# Patient Record
Sex: FEMALE | Race: WHITE | NOT HISPANIC OR LATINO | Employment: OTHER | URBAN - METROPOLITAN AREA
[De-identification: names, ages, dates, MRNs, and addresses within clinical notes are randomized per-mention and may not be internally consistent; named-entity substitution may affect disease eponyms.]

---

## 2017-02-15 ENCOUNTER — ALLSCRIPTS OFFICE VISIT (OUTPATIENT)
Dept: OTHER | Facility: OTHER | Age: 82
End: 2017-02-15

## 2017-03-20 ENCOUNTER — GENERIC CONVERSION - ENCOUNTER (OUTPATIENT)
Dept: OTHER | Facility: OTHER | Age: 82
End: 2017-03-20

## 2017-04-17 ENCOUNTER — GENERIC CONVERSION - ENCOUNTER (OUTPATIENT)
Dept: OTHER | Facility: OTHER | Age: 82
End: 2017-04-17

## 2017-05-18 ENCOUNTER — ALLSCRIPTS OFFICE VISIT (OUTPATIENT)
Dept: OTHER | Facility: OTHER | Age: 82
End: 2017-05-18

## 2017-05-30 ENCOUNTER — ALLSCRIPTS OFFICE VISIT (OUTPATIENT)
Dept: OTHER | Facility: OTHER | Age: 82
End: 2017-05-30

## 2017-05-31 DIAGNOSIS — M54.50 LOW BACK PAIN: ICD-10-CM

## 2017-05-31 DIAGNOSIS — K21.9 GASTRO-ESOPHAGEAL REFLUX DISEASE WITHOUT ESOPHAGITIS: ICD-10-CM

## 2017-05-31 DIAGNOSIS — I10 ESSENTIAL (PRIMARY) HYPERTENSION: ICD-10-CM

## 2017-05-31 DIAGNOSIS — E78.5 HYPERLIPIDEMIA: ICD-10-CM

## 2017-06-02 ENCOUNTER — APPOINTMENT (OUTPATIENT)
Dept: LAB | Facility: CLINIC | Age: 82
End: 2017-06-02
Payer: MEDICARE

## 2017-06-02 DIAGNOSIS — K21.9 GASTRO-ESOPHAGEAL REFLUX DISEASE WITHOUT ESOPHAGITIS: ICD-10-CM

## 2017-06-02 DIAGNOSIS — E78.5 HYPERLIPIDEMIA: ICD-10-CM

## 2017-06-02 DIAGNOSIS — I10 ESSENTIAL (PRIMARY) HYPERTENSION: ICD-10-CM

## 2017-06-02 LAB
ALBUMIN SERPL BCP-MCNC: 3.8 G/DL (ref 3.5–5)
ALP SERPL-CCNC: 73 U/L (ref 46–116)
ALT SERPL W P-5'-P-CCNC: 17 U/L (ref 12–78)
ANION GAP SERPL CALCULATED.3IONS-SCNC: 4 MMOL/L (ref 4–13)
AST SERPL W P-5'-P-CCNC: 19 U/L (ref 5–45)
BASOPHILS # BLD AUTO: 0.04 THOUSANDS/ΜL (ref 0–0.1)
BASOPHILS NFR BLD AUTO: 1 % (ref 0–1)
BILIRUB SERPL-MCNC: 0.5 MG/DL (ref 0.2–1)
BUN SERPL-MCNC: 30 MG/DL (ref 5–25)
CALCIUM SERPL-MCNC: 9.3 MG/DL (ref 8.3–10.1)
CHLORIDE SERPL-SCNC: 109 MMOL/L (ref 100–108)
CHOLEST SERPL-MCNC: 241 MG/DL (ref 50–200)
CO2 SERPL-SCNC: 24 MMOL/L (ref 21–32)
CREAT SERPL-MCNC: 0.97 MG/DL (ref 0.6–1.3)
EOSINOPHIL # BLD AUTO: 0.23 THOUSAND/ΜL (ref 0–0.61)
EOSINOPHIL NFR BLD AUTO: 3 % (ref 0–6)
ERYTHROCYTE [DISTWIDTH] IN BLOOD BY AUTOMATED COUNT: 13.1 % (ref 11.6–15.1)
GFR SERPL CREATININE-BSD FRML MDRD: 55.3 ML/MIN/1.73SQ M
GLUCOSE P FAST SERPL-MCNC: 93 MG/DL (ref 65–99)
HCT VFR BLD AUTO: 41.6 % (ref 34.8–46.1)
HDLC SERPL-MCNC: 86 MG/DL (ref 40–60)
HGB BLD-MCNC: 13.8 G/DL (ref 11.5–15.4)
LDLC SERPL CALC-MCNC: 139 MG/DL (ref 0–100)
LDLC SERPL DIRECT ASSAY-MCNC: 135 MG/DL (ref 0–100)
LYMPHOCYTES # BLD AUTO: 1.15 THOUSANDS/ΜL (ref 0.6–4.47)
LYMPHOCYTES NFR BLD AUTO: 15 % (ref 14–44)
MCH RBC QN AUTO: 29.1 PG (ref 26.8–34.3)
MCHC RBC AUTO-ENTMCNC: 33.2 G/DL (ref 31.4–37.4)
MCV RBC AUTO: 88 FL (ref 82–98)
MONOCYTES # BLD AUTO: 0.56 THOUSAND/ΜL (ref 0.17–1.22)
MONOCYTES NFR BLD AUTO: 7 % (ref 4–12)
NEUTROPHILS # BLD AUTO: 5.7 THOUSANDS/ΜL (ref 1.85–7.62)
NEUTS SEG NFR BLD AUTO: 74 % (ref 43–75)
NRBC BLD AUTO-RTO: 0 /100 WBCS
PLATELET # BLD AUTO: 251 THOUSANDS/UL (ref 149–390)
PMV BLD AUTO: 10.8 FL (ref 8.9–12.7)
POTASSIUM SERPL-SCNC: 4.9 MMOL/L (ref 3.5–5.3)
PROT SERPL-MCNC: 7 G/DL (ref 6.4–8.2)
RBC # BLD AUTO: 4.74 MILLION/UL (ref 3.81–5.12)
SODIUM SERPL-SCNC: 137 MMOL/L (ref 136–145)
TRIGL SERPL-MCNC: 82 MG/DL
WBC # BLD AUTO: 7.69 THOUSAND/UL (ref 4.31–10.16)

## 2017-06-02 PROCEDURE — 80053 COMPREHEN METABOLIC PANEL: CPT

## 2017-06-02 PROCEDURE — 80061 LIPID PANEL: CPT

## 2017-06-02 PROCEDURE — 83721 ASSAY OF BLOOD LIPOPROTEIN: CPT

## 2017-06-02 PROCEDURE — 85025 COMPLETE CBC W/AUTO DIFF WBC: CPT

## 2017-06-02 PROCEDURE — 36415 COLL VENOUS BLD VENIPUNCTURE: CPT

## 2017-06-06 ENCOUNTER — ALLSCRIPTS OFFICE VISIT (OUTPATIENT)
Dept: OTHER | Facility: OTHER | Age: 82
End: 2017-06-06

## 2017-06-06 ENCOUNTER — HOSPITAL ENCOUNTER (OUTPATIENT)
Dept: RADIOLOGY | Facility: CLINIC | Age: 82
Discharge: HOME | End: 2017-06-06
Payer: MEDICARE

## 2017-06-06 ENCOUNTER — TRANSCRIBE ORDERS (OUTPATIENT)
Dept: RADIOLOGY | Facility: CLINIC | Age: 82
End: 2017-06-06

## 2017-06-06 DIAGNOSIS — M54.50 LOW BACK PAIN: ICD-10-CM

## 2017-06-06 PROCEDURE — 72110 X-RAY EXAM L-2 SPINE 4/>VWS: CPT

## 2017-06-12 ENCOUNTER — ALLSCRIPTS OFFICE VISIT (OUTPATIENT)
Dept: RADIOLOGY | Facility: CLINIC | Age: 82
End: 2017-06-12
Payer: MEDICARE

## 2017-06-12 PROCEDURE — 77002 NEEDLE LOCALIZATION BY XRAY: CPT | Performed by: ANESTHESIOLOGY

## 2017-08-17 ENCOUNTER — ALLSCRIPTS OFFICE VISIT (OUTPATIENT)
Dept: OTHER | Facility: OTHER | Age: 82
End: 2017-08-17

## 2017-11-16 ENCOUNTER — ALLSCRIPTS OFFICE VISIT (OUTPATIENT)
Dept: OTHER | Facility: OTHER | Age: 82
End: 2017-11-16

## 2017-11-17 NOTE — PROGRESS NOTES
Assessment    1  Primary osteoarthritis of both knees (715 16) (M17 0)    Plan  Primary osteoarthritis of both knees    · Betamethasone Sod Phos & Acet 6 (3-3) MG/ML Injection Suspension   · Betamethasone Sod Phos & Acet 6 (3-3) MG/ML Injection Suspension   · Follow-up PRN Evaluation and Treatment  Follow-up  Status: Complete  Done:16Nov2017    Discussion/Summary    Findings consistent with bilateral knee DJD  Discussed findings and treatment options with the patient  Patient was given bilateral knee joint cortisone injections today  She tolerated the procedure well  Advised to apply a cold compress today  She may have repeat injections in 3-4 months if needed  I also discussed with the patient possible use of joint supplement injections  Patient does not want to have knee surgery  All patient's questions were answered to his satisfaction  This note is dictated using WinWeb software  It may contains topographical errors, grammatical errors, improperly dictated words, background noise and other errors  Chief Complaint  Bilateral knee pain  History of Present Illness  HPI: 78 yo white female with history of bilateral knee DJD  The last injections in 08/2017 helped until 2 weeks ago  She start getting more pain in the knee  She is here to have the cortisone injection again  No locking or given away symptoms  No new injury  Review of Systems   Constitutional: No fever, no chills, feels well, no tiredness, no recent weight gain or loss  Eyes: No complaints of eyesight problems, no red eyes  ENT: no loss of hearing, no nosebleeds, no sore throat  Cardiovascular: No complaints of chest pain, no palpitations, no leg claudication or lower extremity edema  Respiratory: no compliants of shortness of breath, no wheezing, no cough  Gastrointestinal: no complaints of abdominal pain, no constipation, no nausea or diarrhea, no vomiting, no bloody stools  Genitourinary: no complaints of dysuria, no incontinence  Musculoskeletal: as noted in HPI  Integumentary: no complaints of skin rash or lesion, no itching or dry skin, no skin wounds  Neurological: no complaints of headache, no confusion, no numbness or tingling, no dizziness  Endocrine: No complaints of muscle weakness, no feelings of weakness, no frequent urination, no excessive thirst   Psychiatric: No suicidal thoughts, no anxiety, no feelings of depression  ROS reviewed  Active Problems  1  Allergic rhinitis (477 9) (J30 9)   2  Back pain (724 5) (M54 9)   3  Bilateral low back pain without sciatica, unspecified chronicity (724 2) (M54 5)   4  Colon polyps (211 3) (K63 5)   5  Encounter for screening mammogram for breast cancer (V76 12) (Z12 31)   6  Generalized osteoarthritis of multiple sites (715 09) (M15 9)   7  GERD (gastroesophageal reflux disease) (530 81) (K21 9)   8  Greater trochanteric bursitis of both hips (726 5) (M70 61,M70 62)   9  Hyperlipidemia (272 4) (E78 5)   10  Hypertension (401 9) (I10)   11  Left knee pain (719 46) (M25 562)   12  Primary osteoarthritis of both knees (715 16) (M17 0)   13  Right knee pain (719 46) (M25 561)    Past Medical History   · History of arthritis (V13 4) (Z87 39)   · History of Otitis externa of right ear (380 10) (H60 91)   · History of Previous Pregnancies Resulted In 2  Living Children   · History of Previously Pregnant Including 2  Miscarriage(S)   · History of Recent Pregnancy With Pregnancy No  4    Surgical History   · History of Dilation And Curettage   · History of Simple Bunion Exostectomy (Silver Procedure)   · History of Tonsillectomy    Family History  Sister    · Family history of Breast Cancer (V16 3)  Brother    · Family history of Cancer    Social History     · Alcohol use (V49 89) (Z78 9)   · Former smoker (V15 82) (V09 489)   · No drug use   · Retired    Current Meds   1  Aspirin 325 MG Oral Tablet; Take 3 tablets TID Recorded   2  Lisinopril 20 MG Oral Tablet;  Take 1 tablet daily; Therapy: 73ZBR1320 to (Evaluate:26Nov2017)  Requested for: 89DVF4282; Last Rx:[id number] Ordered    Allergies  1  Tramadol    Vitals  Signs     Heart Rate: 72  Systolic: 221  Diastolic: 82  Height: 5 ft 3 in  Weight: 143 lb   BMI Calculated: 25 33  BSA Calculated: 1 68    Physical Exam   Constitutional - General appearance: Normal   Musculoskeletal - Gait and station: Normal   Neurologic - Sensation: Normal -- Lower extremity peripheral neuro exam: Normal   Psychiatric - Orientation to person, place, and time: Normal -- Mood and affect: Normal   Eyes  Conjunctiva and lids: Normal     Left Knee: Appearance: joint hypertrophy, but-- no deformity,-- no effusion-- and-- no swelling  Tenderness: lateral joint line-- and-- medial joint line, but-- not diffusely over the anterior knee,-- not the undersurface of the patella-- and-- not the quadriceps tendon  Palpatory findings include crepitus  ROM: Full  Motor: Normal  Special Test: negative patellofemoral apprehension test,-- negative medial Luiza test,-- negative lateral Luiza test,-- no laxity on Valgus Stress-- and-- no laxity on Varus Stress  Right Knee: Appearance: joint hypertrophy, but-- no deformity,-- no effusion-- and-- no swelling  Tenderness: lateral joint line-- and-- medial joint line, but-- not diffusely over the anterior knee,-- not the undersurface of the patella-- and-- not the quadriceps tendon  Palpatory findings include crepitus  ROM: Full  Motor: Normal  Special Test: negative patellofemoral apprehension test,-- negative medial Luiza test,-- negative lateral Luiza test,-- no laxity on Valgus Stress-- and-- no laxity on Varus Stress  Procedure    Procedure: Injection of bilateral knee joint  Indication:  Osteoarthritis  Risk and benefits were discussed with the patient  Alcohol was used to prep the area  ethyl chloride spray was used as a topical anesthetic   Using sterile technique, the aspiration/injection needle was then directed from a Anterolateral aspect  A 22-gauge and 1 5 inch needle was used to inject 7 mL of 1% Lidocaine-- and-- 1 mL of 6mg/mL betamethasone  A bandage was applied  the patient tolerated the procedure well  Complications: none        Future Appointments    Date/Time Provider Specialty Site   02/27/2018 09:15 AM Alyx Powell MD Orthopedic Surgery [address]       Signatures   Electronically signed by : Adis Keller MD; Nov 16 2017 10:11AM EST                       (Author)

## 2018-01-09 NOTE — PROGRESS NOTES
Assessment    1  Primary osteoarthritis of both knees (715 16) (M17 0)    Plan  Allergic rhinitis    · Claritin 10 MG Oral Capsule  Left knee pain    · * XR KNEE 4+ VIEW LEFT; Status:Resulted - Requires Verification;   Done: 34PQZ3061  09:40AM  Primary osteoarthritis of both knees    · Betamethasone Sod Phos & Acet 6 (3-3) MG/ML Injection Suspension   · Betamethasone Sod Phos & Acet 6 (3-3) MG/ML Injection Suspension   · Follow-up PRN Evaluation and Treatment  Follow-up  Status: Complete  Done:  [id number]  Right knee pain    · * XR KNEE 4+ VIEW RIGHT; Status:Resulted - Requires Verification;   Done: 17GUU0667  09:40AM    Discussion/Summary    Findings consistent with bilateral knee DJD  Discussed findings and treatment options  Will provide her cortisone injection as she wishes  Can repeat the injection every 3-4 months as needed  Cold compress today  Chief Complaint  Bilateral knee pain  History of Present Illness  HPI: 78 yo white female with history of bilateral knee DJD  She denies any injury  Her last cortisone injection was done back in 2013  Recently, her knee pain has increased  She denies any locking or given away  She also has had joint supplement injection in the past       Review of Systems    Constitutional: No fever, no chills, feels well, no tiredness, no recent weight gain or loss  Eyes: No complaints of eyesight problems, no red eyes  ENT: no loss of hearing, no nosebleeds, no sore throat  Cardiovascular: No complaints of chest pain, no palpitations, no leg claudication or lower extremity edema  Respiratory: no compliants of shortness of breath, no wheezing, no cough  Gastrointestinal: no complaints of abdominal pain, no constipation, no nausea or diarrhea, no vomiting, no bloody stools  Genitourinary: no complaints of dysuria, no incontinence  Musculoskeletal: as noted in HPI  Integumentary: no complaints of skin rash or lesion, no itching or dry skin, no skin wounds  Neurological: no complaints of headache, no confusion, no numbness or tingling, no dizziness  Endocrine: No complaints of muscle weakness, no feelings of weakness, no frequent urination, no excessive thirst    Psychiatric: No suicidal thoughts, no anxiety, no feelings of depression  Active Problems    1  Allergic rhinitis (477 9) (J30 9)   2  Colon polyps (211 3) (K63 5)   3  Generalized osteoarthritis of multiple sites (715 09) (M15 9)   4  GERD (gastroesophageal reflux disease) (530 81) (K21 9)   5  Hyperlipidemia (272 4) (E78 5)   6  Hypertension (401 9) (I10)   7  Left knee pain (719 46) (M25 562)   8  Otitis externa of right ear (380 10) (H60 91)   9  Right knee pain (719 46) (M25 561)    Past Medical History    · History of Previous Pregnancies Resulted In 2  Living Children   · History of Previously Pregnant Including 2  Miscarriage(S)   · History of Recent Pregnancy With Pregnancy No  4    Surgical History    · History of Tonsillectomy    Family History    · Family history of Breast Cancer (V16 3)    · Family history of Cancer    Social History    · Alcohol use (V49 89) (F10 99)   · Former smoker (V15 82) (S63 142)   · No drug use   · Retired    Current Meds   1  Aspirin 325 MG Oral Tablet; Take 3 tablets TID Recorded   2  Claritin 10 MG Oral Capsule; Take one tablet daily; Therapy: 12Pww8460 to (Evaluate:90Dew5944); Last Rx:[id number] Ordered   3  Lisinopril 10 MG Oral Tablet; TAKE 1 TABLET DAILY FOR BLOOD PRESSURE; Therapy: 81ZTZ3092 to (Magnolia Fletcher)  Requested for: 52QTC6843; Last   Rx:[id number] Ordered    Allergies    1   Tramadol    Vitals  Signs [Data Includes: Current Encounter]    Heart Rate: 58  Systolic: 798  Diastolic: 84  Height: 5 ft 3 in  Weight: 154 lb   BMI Calculated: 27 28  BSA Calculated: 1 74    Physical Exam    Constitutional - General appearance: Normal    Musculoskeletal - Gait and station: Normal    Neurologic - Sensation: Normal  Lower extremity peripheral neuro exam: Normal    Psychiatric - Orientation to person, place, and time: Normal  Mood and affect: Normal    Eyes   Conjunctiva and lids: Normal     Left Knee: Appearance: joint hypertrophy, but no deformity, no effusion and no swelling  Tenderness: lateral joint line and medial joint line, but not diffusely over the anterior knee, not the undersurface of the patella and not the quadriceps tendon  Palpatory findings include crepitus  ROM: Full  Motor: Normal  Special Test: negative patellofemoral apprehension test, negative medial Luiza test, negative lateral Luiza test, no laxity on Valgus Stress and no laxity on Varus Stress  Right Knee: Appearance: joint hypertrophy, but no deformity, no effusion and no swelling  Tenderness: lateral joint line and medial joint line, but not diffusely over the anterior knee, not the undersurface of the patella and not the quadriceps tendon  Palpatory findings include crepitus  ROM: Full  Motor: Normal  Special Test: negative patellofemoral apprehension test, negative medial Luiza test, negative lateral Luiza test, no laxity on Valgus Stress and no laxity on Varus Stress  Results/Data  * XR KNEE 4+ VIEW LEFT 27Jan2016 09:40AM Kyler Monroe    Order Number: [id number]     Test Name Result Flag Reference   XR KNEE 4+ VW LEFT (Report)     LEFT KNEE     INDICATION: Chronic pain  No history of trauma  COMPARISON: August 1, 2012     VIEWS: 4; 4 images     FINDINGS:     There is no acute fracture or dislocation  There is no joint effusion  Osseous structures demineralized  Progressive, severe narrowing of the medial joint compartment  Flattening of the medial femoral condyle  Flattening of the medial tibial plateau  Subchondral cystic changes in the medial tibial plateau and medial    femoral condyle  Moderate narrowing of the lateral joint compartment  Sharpening of the tibial spines  Severe narrowing of the patellofemoral space with osteophytosis       No lytic or blastic lesions are seen  Soft tissues are unremarkable  IMPRESSION:   Progressive, severe degenerative changes  No acute osseous abnormality  Signed by:   Asael Frey DO   1/27/16     * XR KNEE 4+ VIEW RIGHT 27Jan2016 09:40AM [facility] Order Number: [id number]     Test Name Result Flag Reference   XR KNEE 4+ VW RIGHT (Report)     RIGHT KNEE     INDICATION: Chronic pain  No history of trauma  COMPARISON: August 1, 2012     VIEWS: 4; 4 images     FINDINGS:     There is no acute fracture or dislocation  There is no joint effusion  Osseous structures demineralized  Progressive, severe narrowing of the medial joint compartment  Flattening of the medial femoral condyle and medial tibial plateau  Subchondral cystic changes in the medial femoral condyle and medial tibial plateau  Moderate narrowing of the lateral joint compartment with osteophytosis  Moderate to severe narrowing of the patellofemoral space with osteophytosis  Degenerative, medial subluxation of the distal femur with respect to the tibia  No lytic or blastic lesions are seen  Soft tissues are unremarkable  IMPRESSION:   Progressive, severe degenerative changes  No acute osseous abnormality  Signed by:   Asael Frey DO   1/27/16     I personally reviewed the films/images/results in the office today  My interpretation follows  X-ray Review Bilateral knee showed severe DJD  Marginal osteophytes  No soft tissue calcification  Procedure    Procedure: Injection of bilateral knee joint  Indication:  Osteoarthritis  Risk and benefits were discussed with the patient  Alcohol was used to prep the area  ethyl chloride spray was used as a topical anesthetic  Using sterile technique, the aspiration/injection needle was then directed from a Anterolateral aspect  A 22-gauge was used to inject 7 mL of 1% Lidocaine and 1 mL of 6mg/mL betamethasone  A bandage was applied   the patient tolerated the procedure well  Complications: none        Signatures   Electronically signed by : Jarred Osborne MD; Jan 27 2016 10:25AM EST                       (Author)

## 2018-01-10 NOTE — MISCELLANEOUS
Message   Recorded as Task   Date: 04/17/2017 01:35 PM, Created By: Daniel Diaz   Task Name: Med Renewal Request   Assigned To: Edilson Chambers   Regarding Patient: Amanda Valenzuela, Status: Active   Comment:    Myra Knox - 17 Apr 2017 1:35 PM     TASK CREATED  Caller: Self; Renew Medication; [phone] (Home)  Refill Lisinopril 10mg to [facility] -- call [phone] if ? Leila Mcknight - 17 Apr 2017 2:37 PM     TASK REASSIGNED: Previously Assigned To Leila Mcknight  Please let the patient know that I sent in a refill for 2 months but she should be scheduling a checkup soon  Blake,April - 17 Apr 2017 2:41 PM     TASK EDITED  left detailed message on patient's personal number  Active Problems    1  Allergic rhinitis (477 9) (J30 9)   2  Colon polyps (211 3) (K63 5)   3  Generalized osteoarthritis of multiple sites (715 09) (M15 9)   4  GERD (gastroesophageal reflux disease) (530 81) (K21 9)   5  Hyperlipidemia (272 4) (E78 5)   6  Hypertension (401 9) (I10)   7  Left knee pain (719 46) (M25 562)   8  Otitis externa of right ear (380 10) (H60 91)   9  Primary osteoarthritis of both knees (715 16) (M17 0)   10  Right knee pain (719 46) (M25 561)    Current Meds   1  Aspirin 325 MG Oral Tablet; Take 3 tablets TID Recorded   2  Lisinopril 10 MG Oral Tablet; TAKE 1 TABLET DAILY FOR BLOOD PRESSURE; Therapy: 98EGE6019 to (Evaluate:76Raq1690)  Requested for: 82Sgq4326; Last   Rx:[id number] Ordered    Allergies    1   Tramadol    Signatures   Electronically signed by : Wen Prakash, ; Apr 17 2017  2:41PM EST                       (Author)

## 2018-01-12 VITALS
HEIGHT: 63 IN | HEART RATE: 67 BPM | DIASTOLIC BLOOD PRESSURE: 78 MMHG | SYSTOLIC BLOOD PRESSURE: 143 MMHG | BODY MASS INDEX: 26.58 KG/M2 | WEIGHT: 150 LBS

## 2018-01-12 VITALS
WEIGHT: 145 LBS | BODY MASS INDEX: 25.69 KG/M2 | HEIGHT: 63 IN | SYSTOLIC BLOOD PRESSURE: 122 MMHG | TEMPERATURE: 97.9 F | DIASTOLIC BLOOD PRESSURE: 64 MMHG

## 2018-01-13 VITALS
WEIGHT: 143 LBS | HEART RATE: 72 BPM | SYSTOLIC BLOOD PRESSURE: 132 MMHG | DIASTOLIC BLOOD PRESSURE: 82 MMHG | BODY MASS INDEX: 25.34 KG/M2 | HEIGHT: 63 IN

## 2018-01-13 VITALS
BODY MASS INDEX: 24.98 KG/M2 | HEIGHT: 63 IN | DIASTOLIC BLOOD PRESSURE: 78 MMHG | SYSTOLIC BLOOD PRESSURE: 120 MMHG | WEIGHT: 141 LBS | HEART RATE: 68 BPM

## 2018-01-14 VITALS
BODY MASS INDEX: 25.69 KG/M2 | DIASTOLIC BLOOD PRESSURE: 78 MMHG | HEART RATE: 64 BPM | WEIGHT: 145 LBS | SYSTOLIC BLOOD PRESSURE: 140 MMHG | HEIGHT: 63 IN

## 2018-01-14 VITALS
BODY MASS INDEX: 27.46 KG/M2 | WEIGHT: 155 LBS | SYSTOLIC BLOOD PRESSURE: 142 MMHG | HEART RATE: 76 BPM | DIASTOLIC BLOOD PRESSURE: 68 MMHG

## 2018-01-16 NOTE — MISCELLANEOUS
Message   Recorded as Task   Date: 03/20/2017 11:56 AM, Created By: Christelle Gracia   Task Name: Med Renewal Request   Assigned To: Ez Dumont   Regarding Patient: Everardo Deal, Status: Active   Comment:    Lorena Dejesus - 20 Mar 2017 11:56 AM     TASK CREATED  Caller: Self; Renew Medication; [phone] (Home)  EVELYN IS ASKING IF YOU WOULD ORDER A     "C" RING  (THAT IS THE SIZE)TO HOLD IN HER BLADDER, SHE LOST THE ONE SHE HAD (PESSRY IS THE NAME OF IT)     [employer]    [phone]    I TOLD HER I WAS NOT SURE THAT THIS COULD BE DONE, SHE SAID THAT IF YOU COULDN'T DO IT SHE WILL GO ON THE INTERNET AND GET IT THAT WAY  Leila Mcknight - 20 Mar 2017 12:53 PM     TASK REASSIGNED: Previously Assigned To Ez Dumont  She needs to see a GYN for this and they will prescribe-would recommend Padmini Gary - 20 Mar 2017 2:45 PM     TASK EDITED  She again said that she was going to try and order off the internet  She says that she has Dr Agnes Griffin phone number and saw her awhile ago  She does not want to see a doctor  I advised against her going on the internet for a "C" ring  mjs        Active Problems    1  Allergic rhinitis (477 9) (J30 9)   2  Colon polyps (211 3) (K63 5)   3  Generalized osteoarthritis of multiple sites (715 09) (M15 9)   4  GERD (gastroesophageal reflux disease) (530 81) (K21 9)   5  Hyperlipidemia (272 4) (E78 5)   6  Hypertension (401 9) (I10)   7  Left knee pain (719 46) (M25 562)   8  Otitis externa of right ear (380 10) (H60 91)   9  Primary osteoarthritis of both knees (715 16) (M17 0)   10  Right knee pain (719 46) (M25 561)    Current Meds   1  Aspirin 325 MG Oral Tablet; Take 3 tablets TID Recorded   2  Lisinopril 10 MG Oral Tablet; TAKE 1 TABLET DAILY FOR BLOOD PRESSURE; Therapy: 07YLZ4041 to (Ida Valencia)  Requested for: 09Rmk1637; Last   Rx:[id number] Ordered    Allergies    1   Tramadol    Signatures   Electronically signed by : Jeremy Amezcua DO; Mar 20 2017  5:02PM EST                       (Author)

## 2018-03-13 ENCOUNTER — OFFICE VISIT (OUTPATIENT)
Dept: PAIN MEDICINE | Facility: CLINIC | Age: 83
End: 2018-03-13
Payer: MEDICARE

## 2018-03-13 VITALS
DIASTOLIC BLOOD PRESSURE: 78 MMHG | BODY MASS INDEX: 24.32 KG/M2 | WEIGHT: 146 LBS | HEART RATE: 78 BPM | TEMPERATURE: 98 F | SYSTOLIC BLOOD PRESSURE: 124 MMHG | HEIGHT: 65 IN

## 2018-03-13 DIAGNOSIS — M70.62 GREATER TROCHANTERIC BURSITIS OF BOTH HIPS: Primary | ICD-10-CM

## 2018-03-13 DIAGNOSIS — M70.61 GREATER TROCHANTERIC BURSITIS OF BOTH HIPS: Primary | ICD-10-CM

## 2018-03-13 DIAGNOSIS — M25.552 BILATERAL HIP PAIN: ICD-10-CM

## 2018-03-13 DIAGNOSIS — M25.551 BILATERAL HIP PAIN: ICD-10-CM

## 2018-03-13 PROBLEM — M54.50 BILATERAL LOW BACK PAIN WITHOUT SCIATICA: Status: ACTIVE | Noted: 2017-06-06

## 2018-03-13 PROBLEM — M54.9 BACK PAIN: Status: ACTIVE | Noted: 2017-06-06

## 2018-03-13 PROCEDURE — 99214 OFFICE O/P EST MOD 30 MIN: CPT | Performed by: ANESTHESIOLOGY

## 2018-03-13 NOTE — PROGRESS NOTES
Assessment:  1  Greater trochanteric bursitis of both hips    2  Bilateral hip pain        Plan: At this point time the patient's pain persists despite relative rest activity modification and nonsteroidal anti-inflammatories  To directly address any inflammatory component of the patient's pain as she has benefit in the past I think it is reasonable pursue bilateral greater trochanteric bursa injection under fluoroscopic guidance  We will Re group after 1-2 weeks if her pain persists 1 May consider further imaging of the lumbar spine  In the office today did review the nature the patient's pathology in depth using diagrams and models, I discussed the approach was used for the greater trochanteric bursa injections and provided literature for home review  Patient understands the risks associated with the procedure not limited to but including bleeding, tissue injury, allergic reaction, nerve damage exacerbation of symptoms patient provided written verbal consent    My impressions and treatment recommendations were discussed in detail with the patient who verbalized understanding and had no further questions  Discharge instructions were provided  I personally saw and examined the patient and I agree with the above discussed plan of care  The patient has the current Goals: Decreased pain and increased function  The patent has the current Barriers:  Greater trochanteric bursitis    Patient is able to Self-Care    The treatment plan was reviewed with the patient  The patient understands and agrees with the treatment plan   The patient  was counseled regarding instructions for management, risk factor reductions, prognosis, patient and family education, risks and benefits of treatment options and importance of compliance with treatment      25 minutes was spent with the patient greater than 50% of the time counseling/coordinating care    Orders Placed This Encounter   Procedures    FL spine and pain procedure Standing Status:   Future     Standing Expiration Date:   3/13/2022     Order Specific Question:   Reason for Exam:     Answer:   b/l GT bursa injection     Order Specific Question:   Anticoagulant hold needed? Answer:   no     No orders of the defined types were placed in this encounter  History of Present Illness:    Orlando Cabrera is a 81 y o  female who was last seen in June of 2017 and underwent a bilateral greater trochanteric bursa injection  Patient had relief for approximately 6 months but her pain subsequently returned significant interfering with her daily living activities  She rates her pain as 10/10 on the visual analog scale is worse in the morning described sharp is intermittent located on the lateral aspect of both thighs  I have personally reviewed and/or updated the patient's past medical history, past surgical history, family history, social history, current medications, allergies, and vital signs today  Review of Systems:    Review of Systems   Constitutional: Negative for fever and unexpected weight change  HENT: Negative for trouble swallowing  Eyes: Negative for visual disturbance  Respiratory: Negative for shortness of breath and wheezing  Cardiovascular: Negative for chest pain and palpitations  Gastrointestinal: Negative for constipation, diarrhea, nausea and vomiting  Endocrine: Negative for cold intolerance, heat intolerance and polydipsia  Genitourinary: Negative for difficulty urinating and frequency  Musculoskeletal: Positive for gait problem (difficulty walking )  Negative for arthralgias, joint swelling and myalgias  Skin: Negative for rash  Neurological: Positive for weakness (muscle weakness)  Negative for dizziness, seizures, syncope and headaches  Hematological: Does not bruise/bleed easily  Psychiatric/Behavioral: Negative for dysphoric mood  All other systems reviewed and are negative        Patient Active Problem List Diagnosis    Allergic rhinitis    Back pain    Bilateral low back pain without sciatica    Colon polyps    Generalized osteoarthritis of multiple sites    GERD (gastroesophageal reflux disease)    Greater trochanteric bursitis of both hips    Hyperlipidemia    Hypertension    Left knee pain    Primary osteoarthritis of both knees    Right knee pain       Past Medical History:   Diagnosis Date    Allergic rhinitis     Arthritis     Back pain     Colon polyps     GERD (gastroesophageal reflux disease)     Hyperlipidemia     Hypertension        Past Surgical History:   Procedure Laterality Date    DILATION AND CURETTAGE OF UTERUS      TONSILLECTOMY         Family History   Problem Relation Age of Onset    Breast cancer Sister     Cancer Brother        Social History     Occupational History    Not on file  Social History Main Topics    Smoking status: Former Smoker    Smokeless tobacco: Never Used    Alcohol use Yes    Drug use: No    Sexual activity: Not on file       Current Outpatient Prescriptions on File Prior to Visit   Medication Sig    aspirin 325 mg tablet Take by mouth    lisinopril (ZESTRIL) 20 mg tablet Take 1 tablet by mouth daily     No current facility-administered medications on file prior to visit  Allergies   Allergen Reactions    Tramadol Headache       Physical Exam:    /78   Pulse 78   Temp 98 °F (36 7 °C) (Oral)   Ht 5' 5" (1 651 m)   Wt 66 2 kg (146 lb)   BMI 24 30 kg/m²     Constitutional: normal, well developed, well nourished, alert, in no distress and non-toxic and no overt pain behavior    Eyes: anicteric  HEENT: grossly intact  Neck: supple, symmetric, trachea midline and no masses   Pulmonary:even and unlabored  Cardiovascular:No edema or pitting edema present  Skin:Normal without rashes or lesions and well hydrated  Psychiatric:Mood and affect appropriate  Neurologic:Cranial Nerves II-XII grossly intact  Musculoskeletal:normal tenderness of the bilateral greater trochanter

## 2018-03-16 ENCOUNTER — HOSPITAL ENCOUNTER (OUTPATIENT)
Dept: RADIOLOGY | Facility: CLINIC | Age: 83
Discharge: HOME | End: 2018-03-16
Attending: ANESTHESIOLOGY | Admitting: ANESTHESIOLOGY
Payer: MEDICARE

## 2018-03-16 VITALS
OXYGEN SATURATION: 98 % | SYSTOLIC BLOOD PRESSURE: 165 MMHG | RESPIRATION RATE: 20 BRPM | DIASTOLIC BLOOD PRESSURE: 79 MMHG | TEMPERATURE: 97.9 F | HEART RATE: 54 BPM

## 2018-03-16 DIAGNOSIS — M70.62 GREATER TROCHANTERIC BURSITIS OF BOTH HIPS: ICD-10-CM

## 2018-03-16 DIAGNOSIS — M25.552 BILATERAL HIP PAIN: ICD-10-CM

## 2018-03-16 DIAGNOSIS — M25.551 BILATERAL HIP PAIN: ICD-10-CM

## 2018-03-16 DIAGNOSIS — M70.61 GREATER TROCHANTERIC BURSITIS OF BOTH HIPS: ICD-10-CM

## 2018-03-16 PROCEDURE — 20610 DRAIN/INJ JOINT/BURSA W/O US: CPT | Performed by: ANESTHESIOLOGY

## 2018-03-16 PROCEDURE — 77002 NEEDLE LOCALIZATION BY XRAY: CPT | Performed by: ANESTHESIOLOGY

## 2018-03-16 RX ORDER — METHYLPREDNISOLONE ACETATE 80 MG/ML
160 INJECTION, SUSPENSION INTRA-ARTICULAR; INTRALESIONAL; INTRAMUSCULAR; PARENTERAL; SOFT TISSUE ONCE
Status: COMPLETED | OUTPATIENT
Start: 2018-03-16 | End: 2018-03-16

## 2018-03-16 RX ORDER — LIDOCAINE HYDROCHLORIDE 10 MG/ML
5 INJECTION, SOLUTION EPIDURAL; INFILTRATION; INTRACAUDAL; PERINEURAL ONCE
Status: COMPLETED | OUTPATIENT
Start: 2018-03-16 | End: 2018-03-16

## 2018-03-16 RX ADMIN — LIDOCAINE HYDROCHLORIDE 7 ML: 20 INJECTION, SOLUTION EPIDURAL; INFILTRATION; INTRACAUDAL at 10:20

## 2018-03-16 RX ADMIN — IOHEXOL 2 ML: 300 INJECTION, SOLUTION INTRAVENOUS at 10:30

## 2018-03-16 RX ADMIN — METHYLPREDNISOLONE ACETATE 160 MG: 80 INJECTION, SUSPENSION INTRA-ARTICULAR; INTRALESIONAL; INTRAMUSCULAR; SOFT TISSUE at 10:19

## 2018-03-16 RX ADMIN — LIDOCAINE HYDROCHLORIDE 5 ML: 10 INJECTION, SOLUTION EPIDURAL; INFILTRATION; INTRACAUDAL; PERINEURAL at 10:19

## 2018-03-16 NOTE — H&P
History of Present Illness: The patient is a 81 y o  female who presents with complaints of bilateral hip pain  Patient Active Problem List   Diagnosis    Allergic rhinitis    Back pain    Bilateral low back pain without sciatica    Colon polyps    Generalized osteoarthritis of multiple sites    GERD (gastroesophageal reflux disease)    Greater trochanteric bursitis of both hips    Hyperlipidemia    Hypertension    Left knee pain    Primary osteoarthritis of both knees    Right knee pain       Past Medical History:   Diagnosis Date    Allergic rhinitis     Arthritis     Back pain     Colon polyps     GERD (gastroesophageal reflux disease)     Hyperlipidemia     Hypertension        Past Surgical History:   Procedure Laterality Date    DILATION AND CURETTAGE OF UTERUS      TONSILLECTOMY           Current Outpatient Prescriptions:     aspirin 325 mg tablet, Take by mouth  , Disp: , Rfl:     lisinopril (ZESTRIL) 20 mg tablet, Take 1 tablet by mouth daily, Disp: , Rfl:     Allergies   Allergen Reactions    Tramadol Headache       Physical Exam:   Vitals:    03/16/18 1002   BP: 165/79   Pulse: (!) 54   Resp: 20   Temp: 97 9 °F (36 6 °C)   SpO2: 98%     General: Awake, Alert, Oriented x 3, Mood and affect appropriate  Respiratory: Respirations even and unlabored  Cardiovascular: Peripheral pulses intact; no edema  Musculoskeletal Exam:  Tenderness to palpation bilateral greater trochanter    ASA Score: II    Assessment:   1  Greater trochanteric bursitis of both hips    2   Bilateral hip pain        Plan: b/l GT bursa injection

## 2018-03-16 NOTE — DISCHARGE INSTRUCTIONS
Steroid Joint Injection   WHAT YOU NEED TO KNOW:   A steroid joint injection is a procedure to inject steroid medicine into a joint  Steroid medicine decreases pain and inflammation  The injection may also contain an anesthetic (numbing medicine) to decrease pain  It may be done to treat conditions such as arthritis, gout, or carpal tunnel syndrome  The injections may be given in your knee, ankle, shoulder, elbow, wrist, ankle or sacroiliac joint  1  Do not apply heat to any area that is numb  If you have discomfort or soreness at the injection site, you may apply ice today, 20 minutes on and 20 minutes off  Tomorrow you may use ice or warm, moist heat  Do not apply ice or heat directly to the skin  2  You may have an increase or change in the discomfort for 36-48 hours after your treatment  Apply ice and continue with any pain medicine you have been prescribed  3  Do not do anything strenuous today  You may shower, but no tub baths or hot tubs today  You may resume your normal activities tomorrow, but do not overdo it  Resume normal activities slowly when you are feeling better  4  If you experience redness, drainage or swelling at the injection site, or if you develop a fever above 100 degrees, please call [facility] at [phone] or go to the Emergency Room  5  Continue to take all routine medicines prescribed by your primary care physician unless otherwise instructed by our staff  Most blood thinners should be started again according to your regularly scheduled dosing  If you have any questions, please give our office a call  If you have a problem specifically related to your procedure, please call our office at [phone]  Problems not related to your procedure should be directed to your primary care physician

## 2018-03-23 ENCOUNTER — TELEPHONE (OUTPATIENT)
Dept: PAIN MEDICINE | Facility: CLINIC | Age: 83
End: 2018-03-23

## 2018-03-23 NOTE — TELEPHONE ENCOUNTER
Patient states she is doing good she got 100% relief and her pain level is a 0/10       S/P B/L [facility] on 03/16/18 w/SL [place]  No f/u scheduled

## 2018-06-14 RX ORDER — LISINOPRIL 20 MG/1
TABLET ORAL
Qty: 30 TABLET | OUTPATIENT
Start: 2018-06-14

## 2018-07-12 ENCOUNTER — OFFICE VISIT (OUTPATIENT)
Dept: OBGYN CLINIC | Facility: CLINIC | Age: 83
End: 2018-07-12
Payer: MEDICARE

## 2018-07-12 VITALS
DIASTOLIC BLOOD PRESSURE: 82 MMHG | SYSTOLIC BLOOD PRESSURE: 140 MMHG | HEIGHT: 65 IN | BODY MASS INDEX: 22.99 KG/M2 | WEIGHT: 138 LBS | HEART RATE: 78 BPM

## 2018-07-12 DIAGNOSIS — M17.0 PRIMARY OSTEOARTHRITIS OF BOTH KNEES: Primary | ICD-10-CM

## 2018-07-12 PROCEDURE — 99213 OFFICE O/P EST LOW 20 MIN: CPT | Performed by: ORTHOPAEDIC SURGERY

## 2018-07-12 PROCEDURE — 20610 DRAIN/INJ JOINT/BURSA W/O US: CPT | Performed by: ORTHOPAEDIC SURGERY

## 2018-07-12 RX ORDER — LIDOCAINE HYDROCHLORIDE 10 MG/ML
4 INJECTION, SOLUTION INFILTRATION; PERINEURAL
Status: COMPLETED | OUTPATIENT
Start: 2018-07-12 | End: 2018-07-12

## 2018-07-12 RX ORDER — BETAMETHASONE SODIUM PHOSPHATE AND BETAMETHASONE ACETATE 3; 3 MG/ML; MG/ML
6 INJECTION, SUSPENSION INTRA-ARTICULAR; INTRALESIONAL; INTRAMUSCULAR; SOFT TISSUE
Status: COMPLETED | OUTPATIENT
Start: 2018-07-12 | End: 2018-07-12

## 2018-07-12 RX ADMIN — LIDOCAINE HYDROCHLORIDE 4 ML: 10 INJECTION, SOLUTION INFILTRATION; PERINEURAL at 10:44

## 2018-07-12 RX ADMIN — LIDOCAINE HYDROCHLORIDE 4 ML: 10 INJECTION, SOLUTION INFILTRATION; PERINEURAL at 10:45

## 2018-07-12 RX ADMIN — BETAMETHASONE SODIUM PHOSPHATE AND BETAMETHASONE ACETATE 6 MG: 3; 3 INJECTION, SUSPENSION INTRA-ARTICULAR; INTRALESIONAL; INTRAMUSCULAR; SOFT TISSUE at 10:44

## 2018-07-12 RX ADMIN — BETAMETHASONE SODIUM PHOSPHATE AND BETAMETHASONE ACETATE 6 MG: 3; 3 INJECTION, SUSPENSION INTRA-ARTICULAR; INTRALESIONAL; INTRAMUSCULAR; SOFT TISSUE at 10:45

## 2018-07-12 NOTE — PROGRESS NOTES
Assessment:     1  Primary osteoarthritis of both knees        Plan:     Problem List Items Addressed This Visit        Musculoskeletal and Integument    Primary osteoarthritis of both knees - Primary     Findings consistent with bilateral knee osteoarthritis  Discussed findings and treatment options with the patient  Cortisone injection was done in both knee under sterile condition, which patient tolerated well  Discussed possible use of joint supplement injection which patient declined  Patient will contact me if her symptoms returned  She may have the cortisone injection repeated every 3-4 months  Cold compress over bilateral knee today  All patient's questions were answered to her satisfaction  This note is created using dictation transcription  It may contain typographical errors, grammatical errors, improperly dictated words, background noise and other errors  Relevant Medications    lidocaine (XYLOCAINE) 1 % injection 4 mL (Completed)    lidocaine (XYLOCAINE) 1 % injection 4 mL (Completed)    betamethasone acetate-betamethasone sodium phosphate (CELESTONE) injection 6 mg (Completed)    betamethasone acetate-betamethasone sodium phosphate (CELESTONE) injection 6 mg (Completed)    Other Relevant Orders    Large joint arthrocentesis (Completed)    Large joint arthrocentesis (Completed)         Subjective:     Patient ID: Tila Rodgers is a 81 y o  female  Chief Complaint:  81-year-old female returns today follow-up bilateral knee osteoarthritis  Patient had cortisone injection done November 2017  The past few weeks her knee pain has increased  She is complaining of aching burning sensations  She denies locking or giving way sensations  Patient is here today requesting repeat cortisone injection  She denies any new injury  She did noticed occasional swelling in both knees  She denies fever, chills, or sweats        Allergy:  Allergies   Allergen Reactions    Tramadol Headache Medications:  all current active meds have been reviewed  Past Medical History:  Past Medical History:   Diagnosis Date    Allergic rhinitis     Arthritis     Back pain     Colon polyps     GERD (gastroesophageal reflux disease)     Hyperlipidemia     Hypertension      Past Surgical History:  Past Surgical History:   Procedure Laterality Date    DILATION AND CURETTAGE OF UTERUS      TONSILLECTOMY       Family History:  Family History   Problem Relation Age of Onset    Breast cancer Sister     Cancer Brother      Social History:  History   Alcohol Use    Yes     History   Drug Use No     History   Smoking Status    Former Smoker   Smokeless Tobacco    Never Used     Review of Systems   Constitutional: Negative  HENT: Negative  Eyes: Negative  Respiratory: Negative  Cardiovascular: Negative  Gastrointestinal: Negative  Endocrine: Negative  Genitourinary: Negative  Musculoskeletal: Positive for arthralgias (Bilateral knee) and joint swelling (Bilateral knee)  Skin: Negative  Allergic/Immunologic: Negative  Hematological: Negative  Psychiatric/Behavioral: Negative  Objective:  BP Readings from Last 1 Encounters:   07/12/18 140/82      Wt Readings from Last 1 Encounters:   07/12/18 62 6 kg (138 lb)      BMI:   Estimated body mass index is 22 96 kg/m² as calculated from the following:    Height as of this encounter: 5' 5" (1 651 m)  Weight as of this encounter: 62 6 kg (138 lb)  BSA:   Estimated body surface area is 1 69 meters squared as calculated from the following:    Height as of this encounter: 5' 5" (1 651 m)  Weight as of this encounter: 62 6 kg (138 lb)  Physical Exam   Constitutional: She is oriented to person, place, and time  She appears well-developed  HENT:   Head: Normocephalic and atraumatic  Eyes: EOM are normal  Pupils are equal, round, and reactive to light  Neck: Neck supple     Musculoskeletal:        Right knee: She exhibits effusion (trace)  Left knee: She exhibits effusion (trace)  Neurological: She is alert and oriented to person, place, and time  Skin: Skin is warm  Psychiatric: She has a normal mood and affect  Nursing note and vitals reviewed  Right Knee Exam     Tenderness   The patient is experiencing tenderness in the medial joint line  Range of Motion   The patient has normal right knee ROM  Muscle Strength     The patient has normal right knee strength  Tests   Luiza:  Medial - positive Lateral - negative  Varus: negative  Valgus: negative  Patellar Apprehension: negative    Other   Erythema: absent  Sensation: normal  Pulse: present  Swelling: mild  Other tests: effusion (trace) present    Comments:  Patellofemoral crepitation      Left Knee Exam     Tenderness   The patient is experiencing tenderness in the medial joint line  Range of Motion   The patient has normal left knee ROM  Muscle Strength     The patient has normal left knee strength      Tests   Luiza:  Medial - positive Lateral - negative  Varus: negative  Valgus: negative  Patellar Apprehension: negative    Other   Erythema: absent  Sensation: normal  Pulse: present  Swelling: mild  Effusion: effusion (trace) present    Comments:  Patellofemoral crepitation              Large joint arthrocentesis  Date/Time: 7/12/2018 10:44 AM  Consent given by: patient  Site marked: site marked  Timeout: Immediately prior to procedure a time out was called to verify the correct patient, procedure, equipment, support staff and site/side marked as required   Supporting Documentation  Indications: pain   Procedure Details  Location: knee - R knee  Preparation: Patient was prepped and draped in the usual sterile fashion  Needle size: 22 G  Ultrasound guidance: no  Approach: anterolateral  Medications administered: 4 mL lidocaine 1 %; 6 mg betamethasone acetate-betamethasone sodium phosphate 6 (3-3) mg/mL    Patient tolerance: patient tolerated the procedure well with no immediate complications  Dressing:  Sterile dressing applied  Large joint arthrocentesis  Date/Time: 7/12/2018 10:45 AM  Consent given by: patient  Site marked: site marked  Timeout: Immediately prior to procedure a time out was called to verify the correct patient, procedure, equipment, support staff and site/side marked as required   Supporting Documentation  Indications: pain   Procedure Details  Location: knee - L knee  Preparation: Patient was prepped and draped in the usual sterile fashion  Needle size: 22 G  Ultrasound guidance: no  Approach: anterolateral  Medications administered: 4 mL lidocaine 1 %; 6 mg betamethasone acetate-betamethasone sodium phosphate 6 (3-3) mg/mL    Patient tolerance: patient tolerated the procedure well with no immediate complications  Dressing:  Sterile dressing applied

## 2018-07-12 NOTE — ASSESSMENT & PLAN NOTE
Findings consistent with bilateral knee osteoarthritis  Discussed findings and treatment options with the patient  Cortisone injection was done in both knee under sterile condition, which patient tolerated well  Discussed possible use of joint supplement injection which patient declined  Patient will contact me if her symptoms returned  She may have the cortisone injection repeated every 3-4 months  Cold compress over bilateral knee today  All patient's questions were answered to her satisfaction  This note is created using dictation transcription  It may contain typographical errors, grammatical errors, improperly dictated words, background noise and other errors

## 2018-07-13 ENCOUNTER — TELEPHONE (OUTPATIENT)
Dept: OBGYN CLINIC | Facility: HOSPITAL | Age: 83
End: 2018-07-13

## 2018-07-13 NOTE — TELEPHONE ENCOUNTER
Caller- dr isabel patient  Patient calls to report she was given an injection in her knee's and wants you to know that you are a miracle worker

## 2018-07-24 DIAGNOSIS — I10 HYPERTENSION, ESSENTIAL: Primary | ICD-10-CM

## 2018-07-24 RX ORDER — LISINOPRIL 20 MG/1
20 TABLET ORAL DAILY
Qty: 90 TABLET | Refills: 1 | Status: SHIPPED | OUTPATIENT
Start: 2018-07-24 | End: 2018-09-04 | Stop reason: SDUPTHER

## 2018-09-04 DIAGNOSIS — I10 HYPERTENSION, ESSENTIAL: ICD-10-CM

## 2018-09-04 RX ORDER — LISINOPRIL 20 MG/1
20 TABLET ORAL DAILY
Qty: 90 TABLET | Refills: 0 | Status: SHIPPED | OUTPATIENT
Start: 2018-09-04 | End: 2018-12-03 | Stop reason: SDUPTHER

## 2018-09-05 NOTE — TELEPHONE ENCOUNTER
Patient advised she needs to come in for a checkup before the 90 days runs out  She will call back when she can get transportation and money together for the co-pay     PLM

## 2018-12-03 DIAGNOSIS — I10 HYPERTENSION, ESSENTIAL: ICD-10-CM

## 2018-12-04 RX ORDER — LISINOPRIL 20 MG/1
20 TABLET ORAL DAILY
Qty: 30 TABLET | Refills: 0 | Status: SHIPPED | OUTPATIENT
Start: 2018-12-04 | End: 2019-01-14 | Stop reason: SDUPTHER

## 2019-01-14 DIAGNOSIS — I10 HYPERTENSION, ESSENTIAL: ICD-10-CM

## 2019-01-14 RX ORDER — LISINOPRIL 20 MG/1
20 TABLET ORAL DAILY
Qty: 90 TABLET | Refills: 0 | Status: SHIPPED | OUTPATIENT
Start: 2019-01-14 | End: 2019-05-20 | Stop reason: SDUPTHER

## 2019-05-20 ENCOUNTER — TELEPHONE (OUTPATIENT)
Dept: FAMILY MEDICINE CLINIC | Facility: CLINIC | Age: 84
End: 2019-05-20

## 2019-05-20 DIAGNOSIS — I10 HYPERTENSION, ESSENTIAL: ICD-10-CM

## 2019-05-20 RX ORDER — LISINOPRIL 20 MG/1
20 TABLET ORAL DAILY
Qty: 90 TABLET | Refills: 0 | Status: SHIPPED | OUTPATIENT
Start: 2019-05-20 | End: 2019-10-02 | Stop reason: SDUPTHER

## 2019-05-21 ENCOUNTER — TELEPHONE (OUTPATIENT)
Dept: FAMILY MEDICINE CLINIC | Facility: CLINIC | Age: 84
End: 2019-05-21

## 2019-07-05 ENCOUNTER — TELEPHONE (OUTPATIENT)
Dept: OBGYN CLINIC | Facility: HOSPITAL | Age: 84
End: 2019-07-05

## 2019-07-05 NOTE — TELEPHONE ENCOUNTER
Patient called asking for an appt with Dr Anjum Odom next week, either Thursday or Friday for bilat knee steroid injections  Patient was told that Dr Anjum Odom was out of the office & the patient wanted to be scheduled with his helper then  I told the patient that I would have to make a quick call to ask if that would be ok & asked her to hold while I got the approval  Patient argued that she didn't need the approval because she saw a girl before  I told her that I still needed to get approval  Patient got mad & said never mind  Patient hung up

## 2019-07-08 ENCOUNTER — TELEPHONE (OUTPATIENT)
Dept: PAIN MEDICINE | Facility: MEDICAL CENTER | Age: 84
End: 2019-07-08

## 2019-07-08 NOTE — TELEPHONE ENCOUNTER
Patient is calling to schedule with Rachana Flores  I spoke with patient on Friday & she hung up on me  Patient was offered an appt with Danielle Cristobal & it was okayed by Edouard Lucas for bilat knee injections

## 2019-07-10 ENCOUNTER — OFFICE VISIT (OUTPATIENT)
Dept: PAIN MEDICINE | Facility: CLINIC | Age: 84
End: 2019-07-10
Payer: MEDICARE

## 2019-07-10 VITALS
WEIGHT: 141 LBS | BODY MASS INDEX: 23.49 KG/M2 | SYSTOLIC BLOOD PRESSURE: 130 MMHG | HEART RATE: 88 BPM | HEIGHT: 65 IN | DIASTOLIC BLOOD PRESSURE: 78 MMHG

## 2019-07-10 DIAGNOSIS — M70.62 GREATER TROCHANTERIC BURSITIS OF BOTH HIPS: ICD-10-CM

## 2019-07-10 DIAGNOSIS — G89.4 CHRONIC PAIN SYNDROME: Primary | ICD-10-CM

## 2019-07-10 DIAGNOSIS — M70.61 GREATER TROCHANTERIC BURSITIS OF BOTH HIPS: ICD-10-CM

## 2019-07-10 DIAGNOSIS — M25.552 PAIN OF LEFT HIP JOINT: ICD-10-CM

## 2019-07-10 PROBLEM — M47.816 LUMBAR SPONDYLOSIS: Status: ACTIVE | Noted: 2019-07-10

## 2019-07-10 PROCEDURE — 99213 OFFICE O/P EST LOW 20 MIN: CPT | Performed by: NURSE PRACTITIONER

## 2019-07-10 PROCEDURE — 20610 DRAIN/INJ JOINT/BURSA W/O US: CPT | Performed by: NURSE PRACTITIONER

## 2019-07-10 RX ORDER — METHYLPREDNISOLONE ACETATE 80 MG/ML
80 INJECTION, SUSPENSION INTRA-ARTICULAR; INTRALESIONAL; INTRAMUSCULAR; SOFT TISSUE ONCE
Status: COMPLETED | OUTPATIENT
Start: 2019-07-10 | End: 2019-07-10

## 2019-07-10 RX ORDER — LIDOCAINE HYDROCHLORIDE 10 MG/ML
5 INJECTION, SOLUTION EPIDURAL; INFILTRATION; INTRACAUDAL; PERINEURAL ONCE
Status: COMPLETED | OUTPATIENT
Start: 2019-07-10 | End: 2019-07-10

## 2019-07-10 RX ADMIN — LIDOCAINE HYDROCHLORIDE 5 ML: 10 INJECTION, SOLUTION EPIDURAL; INFILTRATION; INTRACAUDAL; PERINEURAL at 13:55

## 2019-07-10 RX ADMIN — METHYLPREDNISOLONE ACETATE 80 MG: 80 INJECTION, SUSPENSION INTRA-ARTICULAR; INTRALESIONAL; INTRAMUSCULAR; SOFT TISSUE at 13:55

## 2019-07-10 NOTE — PROGRESS NOTES
Assessment:  1  Chronic pain syndrome    2  Pain of left hip joint    3  Greater trochanteric bursitis of both hips        Plan:  While the patient and her granddaughter were in the office today, I did have a thorough conversation with him regarding her chronic pain syndrome, symptoms, treatment plan options  I did review with the patient that since I feel there is definitely significant inflammatory component and she did note almost a year relief of the previous bilateral greater trochanter bursa injections with Dr Lisa Augustine in March of 2017 until now, I feel would be beneficial proceed with a repeat left greater trochanter bursa injection as she feels the right side is not as bad  The patient was significantly uncomfortable and since we were only going to do 1 side, I decided to the procedure myself in the office today  Please see separate procedure note  I discussed with the patient that if she starts to notice that her right-sided greater trochanter bursa pain does not improve or worsens over the next several weeks to months, she could always call our office and we can get her scheduled for right greater trochanter bursa injection with Dr Lisa Augustine or myself  The patient was agreeable and verbalized an understanding  With regards to her chronic knee pain, I did advise the patient to follow-up with orthopedics as scheduled tomorrow  The patient was agreeable and verbalized an understanding  I discussed with the patient that at this point time she can followup with our office on an as-needed basis  I did review the patient that if her pain symptoms should change, worsen, and/or if she would experience any new symptoms as she would like to be evaluated for, she should give our office a call  The patient was agreeable and verbalized an understanding          _____________________________________________________________________________      Diagnosis: Trochanteric Bursitis: Left  Procedure: Left Greater Trochanteric Bursa Injection  Medical Necessity: Intractable, greater trochanter pain  Procedure Note: After fully informed written consent was obtained and procedure was reviewed with the patient, the patient was placed in the lateral recombinant position (affected side up) on the table  The skin area along the proximal 5 area was prepped in a sterile manner  The greater trochanter was palpated which reproduced tenderness on the ipsilateral side  A 3 5 inch 22-gauge spinal needle was inserted via a lateral approach  The needle was advanced until contact with the greater trochanter was made  After slight withdrawal of the needle, a mixture of 2 cc of 2% lidocaine with 1 cc of methylprednisolone (80 mg) was injected into the region of the greater trochanter, after negative aspiration with each cc  The patient tolerated the procedure well without complications or side effects  The needle was withdrawn, the area clean, and Band-Aids placed  Complications: None  Disposition: Motor function was intact  Vital signs stable  The patient was discharged home without a   The discharge instruction sheet was given to the patient  The patient was discharged with instructions to call immediately if there were any complications  The patient was given a pain diary  Once the pain diary is return, we will consider the next appropriate course of treatment  History of Present Illness: The patient is a 81 y o  female last seen on 3/16/18 who presents for a follow up office visit in regards to chronic pain syndrome secondary to diffuse joint arthralgias and greater trochanter bursitis    The patient currently reports that since her last office visit her left greater than right greater trochanter bursa pain has returned and presents today for evaluation and to discuss the possibility of proceeding with a repeat greater trochanter bursa injection on the left as currently she is not sure she needs an injection on the right   She also continues with worsening knee pain, however, reports she has an office visit scheduled tomorrow with orthopedics and is hoping they will do knee injections tomorrow  I have personally reviewed and/or updated the patient's past medical history, past surgical history, family history, social history, current medications, allergies, and vital signs today  Review of Systems:    Review of Systems   Respiratory: Negative for shortness of breath  Cardiovascular: Positive for chest pain  Gastrointestinal: Positive for vomiting  Negative for constipation, diarrhea and nausea  Musculoskeletal: Positive for gait problem and joint swelling (joint stiffness)  Negative for arthralgias and myalgias  Skin: Negative for rash  Neurological: Positive for dizziness and weakness  Negative for seizures  All other systems reviewed and are negative  Past Medical History:   Diagnosis Date    Allergic rhinitis     Arthritis     Back pain     Colon polyps     GERD (gastroesophageal reflux disease)     Hyperlipidemia     Hypertension        Past Surgical History:   Procedure Laterality Date    DILATION AND CURETTAGE OF UTERUS      TONSILLECTOMY         Family History   Problem Relation Age of Onset    Breast cancer Sister     Cancer Brother        Social History     Occupational History    Not on file   Tobacco Use    Smoking status: Former Smoker    Smokeless tobacco: Never Used   Substance and Sexual Activity    Alcohol use: Yes    Drug use: No    Sexual activity: Not on file         Current Outpatient Medications:     aspirin 325 mg tablet, Take by mouth  , Disp: , Rfl:     lisinopril (ZESTRIL) 20 mg tablet, Take 1 tablet (20 mg total) by mouth daily, Disp: 90 tablet, Rfl: 0  No current facility-administered medications for this visit       Allergies   Allergen Reactions    Tramadol Headache       Physical Exam:    /78   Pulse 88   Ht 5' 5" (1 651 m)   Wt 64 kg (141 lb)   BMI 23 46 kg/m²     Constitutional:normal, well developed, well nourished, alert, in no distress and non-toxic and no overt pain behavior  Eyes:anicteric  HEENT:grossly intact  Neck:supple, symmetric, trachea midline and no masses   Pulmonary:even and unlabored  Cardiovascular:No edema or pitting edema present  Skin:Normal without rashes or lesions and well hydrated  Psychiatric:Mood and affect appropriate  Neurologic:Cranial Nerves II-XII grossly intact  Musculoskeletal:The patient's gait is slightly antalgic, painful, but steady with the use of 2 single canes  Significant tenderness is noted upon exam of the left greater than right greater trochanter bursa  Imaging  No orders to display         No orders of the defined types were placed in this encounter

## 2019-07-10 NOTE — PATIENT INSTRUCTIONS
Steroid Joint Injection   WHAT YOU NEED TO KNOW:   What do I need to know about steroid joint injection? A steroid joint injection is a procedure to inject steroid medicine into a joint  Steroid medicine decreases pain and inflammation  The injection may also contain an anesthetic (numbing medicine) to decrease pain  It may be done to treat conditions such as arthritis, gout, or carpal tunnel syndrome  The injections may be given in your knee, ankle, shoulder, elbow, wrist, or ankle  How do I prepare for steroid joint injection? Your healthcare provider will talk to you about how to prepare for this procedure  He will tell you what medicines to take or not take on the day of your procedure  You may need to stop taking blood thinners several days before your procedure  What will happen during steroid joint injection? You may be given local anesthesia to numb the area where the injection will be given  With local anesthesia, you may still feel pressure during the procedure, but you should not feel any pain  Your healthcare provider may use ultrasound or fluoroscopy (a type of x-ray) to guide the needle to the right area  He will then inject the steroid into your joint  A bandage will be placed on the injection site  What will happen after steroid joint injection? You may have redness, warmth, or sweating in your face and chest right after the steroid injection  Steroids can affect blood sugar levels  If you have diabetes, check your blood sugars closely in the first 24 hours after your procedure  What are the risks of steroid joint injection? You may get an infection in your joint  The injection may also cause more pain during the first 24 to 36 hours  You may need more than one injection to feel pain relief  The skin near the injection site may be damaged and become discolored or indented  This can happen if the steroid is placed too close to your skin   A tendon near the injection site may rupture or a nerve can be damaged  CARE AGREEMENT:   You have the right to help plan your care  Learn about your health condition and how it may be treated  Discuss treatment options with your caregivers to decide what care you want to receive  You always have the right to refuse treatment  The above information is an  only  It is not intended as medical advice for individual conditions or treatments  Talk to your doctor, nurse or pharmacist before following any medical regimen to see if it is safe and effective for you  © 2017 [employer]  Information is for End User's use only and may not be sold, redistributed or otherwise used for commercial purposes  All illustrations and images included in CareNotes® are the copyrighted property of [employer]  or [employer] Zambr[employer]

## 2019-07-11 ENCOUNTER — OFFICE VISIT (OUTPATIENT)
Dept: OBGYN CLINIC | Facility: CLINIC | Age: 84
End: 2019-07-11
Payer: MEDICARE

## 2019-07-11 VITALS
SYSTOLIC BLOOD PRESSURE: 162 MMHG | BODY MASS INDEX: 23.32 KG/M2 | HEIGHT: 65 IN | HEART RATE: 80 BPM | WEIGHT: 140 LBS | DIASTOLIC BLOOD PRESSURE: 82 MMHG

## 2019-07-11 DIAGNOSIS — M17.0 PRIMARY OSTEOARTHRITIS OF BOTH KNEES: Primary | ICD-10-CM

## 2019-07-11 PROCEDURE — 20610 DRAIN/INJ JOINT/BURSA W/O US: CPT | Performed by: PHYSICIAN ASSISTANT

## 2019-07-11 PROCEDURE — 99213 OFFICE O/P EST LOW 20 MIN: CPT | Performed by: PHYSICIAN ASSISTANT

## 2019-07-11 RX ORDER — LIDOCAINE HYDROCHLORIDE 10 MG/ML
7 INJECTION, SOLUTION EPIDURAL; INFILTRATION; INTRACAUDAL; PERINEURAL
Status: COMPLETED | OUTPATIENT
Start: 2019-07-11 | End: 2019-07-11

## 2019-07-11 RX ORDER — BETAMETHASONE SODIUM PHOSPHATE AND BETAMETHASONE ACETATE 3; 3 MG/ML; MG/ML
6 INJECTION, SUSPENSION INTRA-ARTICULAR; INTRALESIONAL; INTRAMUSCULAR; SOFT TISSUE
Status: COMPLETED | OUTPATIENT
Start: 2019-07-11 | End: 2019-07-11

## 2019-07-11 RX ADMIN — LIDOCAINE HYDROCHLORIDE 7 ML: 10 INJECTION, SOLUTION EPIDURAL; INFILTRATION; INTRACAUDAL; PERINEURAL at 11:38

## 2019-07-11 RX ADMIN — BETAMETHASONE SODIUM PHOSPHATE AND BETAMETHASONE ACETATE 6 MG: 3; 3 INJECTION, SUSPENSION INTRA-ARTICULAR; INTRALESIONAL; INTRAMUSCULAR; SOFT TISSUE at 11:38

## 2019-07-11 NOTE — PROGRESS NOTES
Assessment:     1  Primary osteoarthritis of both knees        Plan:     Problem List Items Addressed This Visit        Musculoskeletal and Integument    Primary osteoarthritis of both knees - Primary     Findings consistent with bilateral knee osteoarthritis  Discussed findings and treatment options with the patient  Repeat cortisone injections were given to the bilateral knee joints today  She tolerated the procedures well  Advised to apply cold compress today  Follow-up as needed if symptoms return  Advised patient the soonest she can have another injection is in 3 months  All questions were answered to patient's satisfaction  This note is created using dictation transcription  It may contain typographical errors, grammatical errors, improperly dictated words, background noise and other errors  Relevant Medications    lidocaine (PF) (XYLOCAINE-MPF) 1 % injection 7 mL (Completed)    lidocaine (PF) (XYLOCAINE-MPF) 1 % injection 7 mL (Completed)    betamethasone acetate-betamethasone sodium phosphate (CELESTONE) injection 6 mg (Completed)    betamethasone acetate-betamethasone sodium phosphate (CELESTONE) injection 6 mg (Completed)    Other Relevant Orders    Large joint arthrocentesis: bilateral knee (Completed)         Subjective:     Patient ID: Liya Hull is a 81 y o  female  Chief Complaint:  81-year-old female returns today follow-up bilateral knee osteoarthritis  Patient had cortisone injections done on July 2018  Bilateral knee pain returned about a month ago  She denies any injury  She would like repeat cortisone injections today      Allergy:  Allergies   Allergen Reactions    Tramadol Headache     Medications:  all current active meds have been reviewed  Past Medical History:  Past Medical History:   Diagnosis Date    Allergic rhinitis     Arthritis     Back pain     Colon polyps     GERD (gastroesophageal reflux disease)     Hyperlipidemia     Hypertension      Past Surgical History:  Past Surgical History:   Procedure Laterality Date    DILATION AND CURETTAGE OF UTERUS      TONSILLECTOMY       Family History:  Family History   Problem Relation Age of Onset    Breast cancer Sister     Cancer Brother      Social History:  Social History     Substance and Sexual Activity   Alcohol Use Yes     Social History     Substance and Sexual Activity   Drug Use No     Social History     Tobacco Use   Smoking Status Former Smoker   Smokeless Tobacco Never Used     Review of Systems   Constitutional: Negative  HENT: Negative  Eyes: Negative  Respiratory: Negative  Cardiovascular: Negative  Gastrointestinal: Negative  Endocrine: Negative  Genitourinary: Negative  Musculoskeletal: Positive for arthralgias (Bilateral knee) and joint swelling (Bilateral knee)  Skin: Negative  Allergic/Immunologic: Negative  Hematological: Negative  Psychiatric/Behavioral: Negative  Objective:  BP Readings from Last 1 Encounters:   07/11/19 162/82      Wt Readings from Last 1 Encounters:   07/11/19 63 5 kg (140 lb)      BMI:   Estimated body mass index is 23 3 kg/m² as calculated from the following:    Height as of this encounter: 5' 5" (1 651 m)  Weight as of this encounter: 63 5 kg (140 lb)  BSA:   Estimated body surface area is 1 7 meters squared as calculated from the following:    Height as of this encounter: 5' 5" (1 651 m)  Weight as of this encounter: 63 5 kg (140 lb)  Physical Exam   Constitutional: She is oriented to person, place, and time  She appears well-developed  HENT:   Head: Normocephalic and atraumatic  Eyes: Pupils are equal, round, and reactive to light  EOM are normal    Neck: Neck supple  Musculoskeletal:        Right knee: She exhibits effusion (trace)  Left knee: She exhibits effusion (trace)  Neurological: She is alert and oriented to person, place, and time  Skin: Skin is warm     Psychiatric: She has a normal mood and affect  Nursing note and vitals reviewed  Right Knee Exam     Muscle Strength   The patient has normal right knee strength  Tenderness   The patient is experiencing tenderness in the medial joint line  Range of Motion   The patient has normal right knee ROM  Tests   Luiza:  Medial - positive Lateral - negative  Varus: negative Valgus: negative  Patellar apprehension: negative    Other   Erythema: absent  Sensation: normal  Pulse: present  Swelling: mild  Effusion: effusion (trace) present    Comments:  Patellofemoral crepitation      Left Knee Exam     Muscle Strength   The patient has normal left knee strength  Tenderness   The patient is experiencing tenderness in the medial joint line  Range of Motion   The patient has normal left knee ROM      Tests   Luiza:  Medial - positive Lateral - negative  Varus: negative Valgus: negative  Patellar apprehension: negative    Other   Erythema: absent  Sensation: normal  Pulse: present  Swelling: mild  Effusion: effusion (trace) present    Comments:  Patellofemoral crepitation              Large joint arthrocentesis: bilateral knee  Date/Time: 7/11/2019 11:38 AM  Consent given by: patient  Site marked: site marked  Timeout: Immediately prior to procedure a time out was called to verify the correct patient, procedure, equipment, support staff and site/side marked as required   Supporting Documentation  Indications: pain   Procedure Details  Location: knee - bilateral knee  Needle size: 22 G  Ultrasound guidance: no  Approach: anterolateral    Medications (Right): 7 mL lidocaine (PF) 1 %; 6 mg betamethasone acetate-betamethasone sodium phosphate 6 (3-3) mg/mLMedications (Left): 7 mL lidocaine (PF) 1 %; 6 mg betamethasone acetate-betamethasone sodium phosphate 6 (3-3) mg/mL   Patient tolerance: patient tolerated the procedure well with no immediate complications  Dressing:  Sterile dressing applied

## 2019-07-11 NOTE — ASSESSMENT & PLAN NOTE
Findings consistent with bilateral knee osteoarthritis  Discussed findings and treatment options with the patient  Repeat cortisone injections were given to the bilateral knee joints today  She tolerated the procedures well  Advised to apply cold compress today  Follow-up as needed if symptoms return  Advised patient the soonest she can have another injection is in 3 months  All questions were answered to patient's satisfaction  This note is created using dictation transcription  It may contain typographical errors, grammatical errors, improperly dictated words, background noise and other errors

## 2019-07-16 ENCOUNTER — TELEPHONE (OUTPATIENT)
Dept: PAIN MEDICINE | Facility: CLINIC | Age: 84
End: 2019-07-16

## 2019-07-16 NOTE — TELEPHONE ENCOUNTER
Can you please call the patient and see how she is doing from her Left Greater Trochanter bursa injection she had last week on 7/10/19? How is the right side doing?

## 2019-07-18 NOTE — TELEPHONE ENCOUNTER
LM at home phone  Sw pt at cell #, Pt stated that she is doing well, 100% improvement  Walking w/ no pain or assistive devices  No issues w/ pain on the right  Pt stated that she will cb if questions or concerns arise  Advised pt, will make DG aware and cb if there is anything additional  Pt verbalized understanding and appreciation

## 2019-08-26 ENCOUNTER — TELEPHONE (OUTPATIENT)
Dept: FAMILY MEDICINE CLINIC | Facility: CLINIC | Age: 84
End: 2019-08-26

## 2019-10-02 DIAGNOSIS — I10 HYPERTENSION, ESSENTIAL: ICD-10-CM

## 2019-10-02 RX ORDER — LISINOPRIL 20 MG/1
20 TABLET ORAL DAILY
Qty: 90 TABLET | Refills: 0 | Status: SHIPPED | OUTPATIENT
Start: 2019-10-02 | End: 2020-02-10 | Stop reason: SDUPTHER

## 2019-12-18 ENCOUNTER — OFFICE VISIT (OUTPATIENT)
Dept: FAMILY MEDICINE CLINIC | Facility: CLINIC | Age: 84
End: 2019-12-18
Payer: MEDICARE

## 2019-12-18 VITALS
WEIGHT: 136 LBS | HEIGHT: 65 IN | DIASTOLIC BLOOD PRESSURE: 80 MMHG | RESPIRATION RATE: 15 BRPM | HEART RATE: 79 BPM | BODY MASS INDEX: 22.66 KG/M2 | TEMPERATURE: 98.6 F | SYSTOLIC BLOOD PRESSURE: 140 MMHG

## 2019-12-18 DIAGNOSIS — Z00.00 MEDICARE ANNUAL WELLNESS VISIT, INITIAL: Primary | ICD-10-CM

## 2019-12-18 DIAGNOSIS — Z13.1 SCREENING FOR DIABETES MELLITUS: ICD-10-CM

## 2019-12-18 DIAGNOSIS — Z23 NEED FOR VACCINATION: ICD-10-CM

## 2019-12-18 DIAGNOSIS — Z13.6 SCREENING FOR CARDIOVASCULAR CONDITION: ICD-10-CM

## 2019-12-18 DIAGNOSIS — R32 URINARY INCONTINENCE, UNSPECIFIED TYPE: Primary | ICD-10-CM

## 2019-12-18 DIAGNOSIS — E78.2 MIXED HYPERLIPIDEMIA: ICD-10-CM

## 2019-12-18 DIAGNOSIS — I10 ESSENTIAL HYPERTENSION: ICD-10-CM

## 2019-12-18 PROBLEM — M25.552 PAIN OF LEFT HIP JOINT: Status: RESOLVED | Noted: 2019-07-10 | Resolved: 2019-12-18

## 2019-12-18 PROCEDURE — G0008 ADMIN INFLUENZA VIRUS VAC: HCPCS | Performed by: FAMILY MEDICINE

## 2019-12-18 PROCEDURE — G0438 PPPS, INITIAL VISIT: HCPCS | Performed by: FAMILY MEDICINE

## 2019-12-18 PROCEDURE — 90662 IIV NO PRSV INCREASED AG IM: CPT | Performed by: FAMILY MEDICINE

## 2019-12-18 RX ORDER — CIPROFLOXACIN 10 MG/ML
INJECTION, SOLUTION, CONCENTRATE INTRAVENOUS
Qty: 100 EACH | Refills: 5 | Status: SHIPPED | OUTPATIENT
Start: 2019-12-18

## 2019-12-18 NOTE — PROGRESS NOTES
Assessment and Plan:     Problem List Items Addressed This Visit        Cardiovascular and Mediastinum    Hypertension    Relevant Orders    Lipid Panel with Direct LDL reflex    CBC and differential    TSH, 3rd generation with Free T4 reflex       Other    Hyperlipidemia    Relevant Orders    Lipid Panel with Direct LDL reflex    CBC and differential    TSH, 3rd generation with Free T4 reflex      Other Visit Diagnoses     Medicare annual wellness visit, initial    -  Primary    Screening for cardiovascular condition        Relevant Orders    Comprehensive metabolic panel    Lipid Panel with Direct LDL reflex    CBC and differential    TSH, 3rd generation with Free T4 reflex    Screening for diabetes mellitus        Relevant Orders    Comprehensive metabolic panel    Lipid Panel with Direct LDL reflex    CBC and differential    TSH, 3rd generation with Free T4 reflex    Need for vaccination        Relevant Orders    influenza vaccine, [id number], high-dose, PF 0 5 mL (FLUZONE HIGH-DOSE) (Completed)      The patient will go for the testing as ordered  She is refusing a lot of the preventative screening today  She is agreeable to getting the flu shot  We will see her back in the office as scheduled  Preventive health issues were discussed with patient, and age appropriate screening tests were ordered as noted in patient's After Visit Summary  Personalized health advice and appropriate referrals for health education or preventive services given if needed, as noted in patient's After Visit Summary  Chief Complaint   Patient presents with    Medicare Wellness Visit        History of Present Illness:     Patient presents for an initial Medicare wellness visit  Lincoln Cunningham is a 81 y o  female who presents today for an initial Medicare wellness visit  she   has been feeling well and has no complaints today  The patient denies any chest pain, shortness of breath, or palpitations  There is no edema  There are no headaches or visual changes  There is no lightheadedness, dizziness, or fainting spells  There are no GI symptoms  The patient goes for dental exams every 6 months and sees her eye doctor  The patient is watching her diet and follows a regular exercise program    She drinks 4-5 drinks a day - she is taking  She is refusing   She lives at home with 2 cats- she has no friends  She has a lot of clutter in her house she trips   She uses a cane and a locker  She states she has a daughter and a son and does not talk to her  Next door neighbor drove her here today    Patient Care Team:  Aden Saldana DO as PCP - General  DO Michelle Cerrato MD     Review of Systems:     Review of Systems   Constitutional: Negative  HENT: Negative  Eyes: Negative  Respiratory: Negative  Cardiovascular: Negative  Gastrointestinal: Negative  Endocrine: Negative  Genitourinary: Negative  Musculoskeletal: Negative  Skin: Negative  Allergic/Immunologic: Negative  Neurological: Negative  Hematological: Negative  Psychiatric/Behavioral: Negative         Problem List:     Patient Active Problem List   Diagnosis    Allergic rhinitis    Back pain    Bilateral low back pain without sciatica    Colon polyps    Generalized osteoarthritis of multiple sites    GERD (gastroesophageal reflux disease)    Greater trochanteric bursitis of both hips    Hyperlipidemia    Hypertension    Primary osteoarthritis of both knees    Lumbar spondylosis    Chronic pain syndrome      Past Medical and Surgical History:     Past Medical History:   Diagnosis Date    Allergic rhinitis     Arthritis     Back pain     Colon polyps     GERD (gastroesophageal reflux disease)     Hyperlipidemia     Hypertension      Past Surgical History:   Procedure Laterality Date    DILATION AND CURETTAGE OF UTERUS      TONSILLECTOMY        Family History:     Family History   Problem Relation Age of Onset  Breast cancer Sister     Cancer Brother       Social History:     Social History     Socioeconomic History    Marital status:      Spouse name: None    Number of children: None    Years of education: None    Highest education level: None   Occupational History    None   Social Needs    Financial resource strain: None    Food insecurity:     Worry: None     Inability: None    Transportation needs:     Medical: None     Non-medical: None   Tobacco Use    Smoking status: Former Smoker    Smokeless tobacco: Never Used    Tobacco comment: quit 50 years ago   Substance and Sexual Activity    Alcohol use:  Yes     Alcohol/week: 5 0 standard drinks     Types: 5 Shots of liquor per week     Frequency: 4 or more times a week     Drinks per session: 5 or 6     Binge frequency: Daily or almost daily     Comment: patient states she drinks out of the bottle daily     Drug use: No    Sexual activity: Not Currently   Lifestyle    Physical activity:     Days per week: None     Minutes per session: None    Stress: None   Relationships    Social connections:     Talks on phone: None     Gets together: None     Attends Anabaptist service: None     Active member of club or organization: None     Attends meetings of clubs or organizations: None     Relationship status: None    Intimate partner violence:     Fear of current or ex partner: None     Emotionally abused: None     Physically abused: None     Forced sexual activity: None   Other Topics Concern    None   Social History Narrative    None      Medications and Allergies:     Current Outpatient Medications   Medication Sig Dispense Refill    aspirin 325 mg tablet Take by mouth       lisinopril (ZESTRIL) 20 mg tablet Take 1 tablet (20 mg total) by mouth daily 90 tablet 0    Diapers & Supplies MISC USE ONE, CHANGE EVERY 4 HOURS AS NEEDED for urine incontinence size large 100 each 5    Sanitary Napkins & Tampons (KOTEX MAXI OVERNITE) PADS USE ONE, CHANGE EVERY 4 HOURS AS NEEDED for urine incontinence 100 each 5     No current facility-administered medications for this visit  Allergies   Allergen Reactions    Tramadol Headache      Immunizations:     Immunization History   Administered Date(s) Administered    Influenza, high dose seasonal 0 5 mL 12/18/2019    Tuberculin Skin Test-PPD Intradermal 04/09/2013, 04/26/2013      Health Maintenance: There are no preventive care reminders to display for this patient  There are no preventive care reminders to display for this patient  Medicare Screening Tests and Risk Assessments:     Jose Luis Portillo is here for her Initial Wellness visit  Last Medicare Wellness visit information reviewed, patient interviewed and updates made to the record today  Health Risk Assessment:   Patient rates overall health as excellent  Patient feels that their physical health rating is same  Eyesight was rated as slightly worse  Hearing was rated as much worse  Patient feels that their emotional and mental health rating is slightly worse  Pain experienced in the last 7 days has been a lot  Patient's pain rating has been 9/10  Patient states that she has experienced no weight loss or gain in last 6 months  She needs to see the eye doctor  Depression Screening:   PHQ-2 Score: 4  PHQ-9 Score: 8      Fall Risk Screening: In the past year, patient has experienced: history of falling in past year    Number of falls: 1  Injured during fall?: No    Feels unsteady when standing or walking?: Yes    Worried about falling?: Yes      Urinary Incontinence Screening:   Patient has leaked urine accidently in the last six months  She needs to see the eye doctor  Home Safety:  Patient has trouble with stairs inside or outside of their home  Patient has no working smoke alarms and has no working carbon monoxide detector   Home safety hazards include: household clutter, loose rugs on the floor, medications that cause fatigue, unsecured electrical cords, not having non-slip bath and/or shower mats, poor household lighting and uneven floors  Nutrition:   Current diet is Regular, No Added Salt, Frequent junk food and Unhealthy  Medications:   Patient is currently taking over-the-counter supplements  OTC medications include: see medication list  Patient is able to manage medications  Activities of Daily Living (ADLs)/Instrumental Activities of Daily Living (IADLs):   Walk and transfer into and out of bed and chair?: Yes  Dress and groom yourself?: Yes    Bathe or shower yourself?: Yes    Feed yourself? Yes  Do your laundry/housekeeping?: No  Manage your money, pay your bills and track your expenses?: Yes  Make your own meals?: Yes    Do your own shopping?: No    ADL comments: Her neighbor helps her  Previous Hospitalizations:   Any hospitalizations or ED visits within the last 12 months?: No      Advance Care Planning:   Living will: Yes    Durable POA for healthcare:  Yes    Advanced directive: Yes    Advanced directive counseling given: Yes    Five wishes given: No    Patient declined ACP directive: No    End of Life Decisions reviewed with patient: Yes    Provider agrees with end of life decisions: Yes      Cognitive Screening:   Provider or family/friend/caregiver concerned regarding cognition?: No    PREVENTIVE SCREENINGS      Cardiovascular Screening:    General: Screening Not Indicated and History Lipid Disorder      Diabetes Screening:     General: Risks and Benefits Discussed    Due for: Blood Glucose      Colorectal Cancer Screening:     General: Screening Not Indicated      Breast Cancer Screening:     General: Screening Not Indicated      Cervical Cancer Screening:    General: Screening Not Indicated      Osteoporosis Screening:    General: Screening Not Indicated      Abdominal Aortic Aneurysm (AAA) Screening:        General: Screening Not Indicated      Lung Cancer Screening:     General: Screening Not Indicated Hepatitis C Screening:    General: Screening Not Indicated    Other Counseling Topics:   Skin self-exam, sunscreen and calcium and vitamin D intake and regular weightbearing exercise  No exam data present     Physical Exam:     /80 (BP Location: Left arm, Patient Position: Sitting, Cuff Size: Standard)   Pulse 79   Temp 98 6 °F (37 °C) (Tympanic)   Resp 15   Ht 5' 5" (1 651 m)   Wt 61 7 kg (136 lb)   LMP  (LMP Unknown)   BMI 22 63 kg/m²     Physical Exam   Constitutional: She is oriented to person, place, and time  She appears well-developed and well-nourished  HENT:   Head: Normocephalic and atraumatic  Mouth/Throat: No oropharyngeal exudate  Eyes: Pupils are equal, round, and reactive to light  Conjunctivae and EOM are normal    Neck: Normal range of motion  No JVD present  No tracheal deviation present  No thyromegaly present  Cardiovascular: Normal rate, regular rhythm, normal heart sounds and intact distal pulses  Exam reveals no gallop and no friction rub  No murmur heard  Pulmonary/Chest: Effort normal and breath sounds normal  No stridor  No respiratory distress  She has no wheezes  She has no rales  She exhibits no tenderness  Abdominal: Soft  Bowel sounds are normal  She exhibits no distension and no mass  There is no tenderness  There is no rebound and no guarding  Musculoskeletal: Normal range of motion  She exhibits no edema, tenderness or deformity  Lymphadenopathy:     She has no cervical adenopathy  Neurological: She is alert and oriented to person, place, and time  She has normal reflexes  She displays normal reflexes  No cranial nerve deficit  She exhibits normal muscle tone  Coordination normal    Skin: Skin is warm and dry              Sandro Odom,

## 2019-12-18 NOTE — PATIENT INSTRUCTIONS
Medicare Preventive Visit Patient Instructions  Thank you for completing your Welcome to Medicare Visit or Medicare Annual Wellness Visit today  Your next wellness visit will be due in one year (12/18/2020)  The screening/preventive services that you may require over the next 5-10 years are detailed below  Some tests may not apply to you based off risk factors and/or age  Screening tests ordered at today's visit but not completed yet may show as past due  Also, please note that scanned in results may not display below  Preventive Screenings:  Service Recommendations Previous Testing/Comments   Colorectal Cancer Screening  * Colonoscopy    * Fecal Occult Blood Test (FOBT)/Fecal Immunochemical Test (FIT)  * Fecal DNA/Cologuard Test  * Flexible Sigmoidoscopy Age: 54-65 years old   Colonoscopy: every 10 years (may be performed more frequently if at higher risk)  OR  FOBT/FIT: every 1 year  OR  Cologuard: every 3 years  OR  Sigmoidoscopy: every 5 years  Screening may be recommended earlier than age 48 if at higher risk for colorectal cancer  Also, an individualized decision between you and your healthcare provider will decide whether screening between the ages of 74-80 would be appropriate  Colonoscopy: Not on file  FOBT/FIT: Not on file  Cologuard: Not on file  Sigmoidoscopy: Not on file         Breast Cancer Screening Age: 36 years old  Frequency: every 1-2 years  Not required if history of left and right mastectomy Mammogram: 05/14/2015       Cervical Cancer Screening Between the ages of 21-29, pap smear recommended once every 3 years  Between the ages of 33-67, can perform pap smear with HPV co-testing every 5 years     Recommendations may differ for women with a history of total hysterectomy, cervical cancer, or abnormal pap smears in past  Pap Smear: Not on file       Hepatitis C Screening Once for adults born between [facility]  More frequently in patients at high risk for Hepatitis C Hep C Antibody: Not on file       Diabetes Screening 1-2 times per year if you're at risk for diabetes or have pre-diabetes Fasting glucose: 93 mg/dL   A1C: No results in last 5 years       Cholesterol Screening Once every 5 years if you don't have a lipid disorder  May order more often based on risk factors  Lipid panel: 06/02/2017         Other Preventive Screenings Covered by Medicare:  1  Abdominal Aortic Aneurysm (AAA) Screening: covered once if your at risk  You're considered to be at risk if you have a family history of AAA  2  Lung Cancer Screening: covers low dose CT scan once per year if you meet all of the following conditions: (1) Age 50-69; (2) No signs or symptoms of lung cancer; (3) Current smoker or have quit smoking within the last 15 years; (4) You have a tobacco smoking history of at least 30 pack years (packs per day multiplied by number of years you smoked); (5) You get a written order from a healthcare provider  3  Glaucoma Screening: covered annually if you're considered high risk: (1) You have diabetes OR (2) Family history of glaucoma OR (3)  aged 48 and older OR (3)  American aged 72 and older  3  Osteoporosis Screening: covered every 2 years if you meet one of the following conditions: (1) You're estrogen deficient and at risk for osteoporosis based off medical history and other findings; (2) Have a vertebral abnormality; (3) On glucocorticoid therapy for more than 3 months; (4) Have primary hyperparathyroidism; (5) On osteoporosis medications and need to assess response to drug therapy  · Last bone density test (DXA Scan): Not on file  5  HIV Screening: covered annually if you're between the age of 12-76  Also covered annually if you are younger than 13 and older than 72 with risk factors for HIV infection  For pregnant patients, it is covered up to 3 times per pregnancy      Immunizations:  Immunization Recommendations   Influenza Vaccine Annual influenza vaccination during flu season is recommended for all persons aged >= 6 months who do not have contraindications   Pneumococcal Vaccine (Prevnar and Pneumovax)  * Prevnar = PCV13  * Pneumovax = PPSV23   Adults 25-60 years old: 1-3 doses may be recommended based on certain risk factors  Adults 72 years old: Prevnar (PCV13) vaccine recommended followed by Pneumovax (PPSV23) vaccine  If already received PPSV23 since turning 65, then PCV13 recommended at least one year after PPSV23 dose  Hepatitis B Vaccine 3 dose series if at intermediate or high risk (ex: diabetes, end stage renal disease, liver disease)   Tetanus (Td) Vaccine - COST NOT COVERED BY MEDICARE PART B Following completion of primary series, a booster dose should be given every 10 years to maintain immunity against tetanus  Td may also be given as tetanus wound prophylaxis  Tdap Vaccine - COST NOT COVERED BY MEDICARE PART B Recommended at least once for all adults  For pregnant patients, recommended with each pregnancy  Shingles Vaccine (Shingrix) - COST NOT COVERED BY MEDICARE PART B  2 shot series recommended in those aged 48 and above     Health Maintenance Due:  There are no preventive care reminders to display for this patient  Immunizations Due:      Topic Date Due    DTaP,Tdap,and Td Vaccines (1 - Tdap) 12/15/1946    Pneumococcal Vaccine: 65+ Years (1 of 2 - PCV13) 12/15/2001    Influenza Vaccine  07/01/2019     Advance Directives   What are advance directives? Advance directives are legal documents that state your wishes and plans for medical care  These plans are made ahead of time in case you lose your ability to make decisions for yourself  Advance directives can apply to any medical decision, such as the treatments you want, and if you want to donate organs  What are the types of advance directives? There are many types of advance directives, and each state has rules about how to use them   You may choose a combination of any of the following:  · Living will:  This is a written record of the treatment you want  You can also choose which treatments you do not want, which to limit, and which to stop at a certain time  This includes surgery, medicine, IV fluid, and tube feedings  · Durable power of  for healthcare [facility]): This is a written record that states who you want to make healthcare choices for you when you are unable to make them for yourself  This person, called a proxy, is usually a family member or a friend  You may choose more than 1 proxy  · Do not resuscitate (DNR) order:  A DNR order is used in case your heart stops beating or you stop breathing  It is a request not to have certain forms of treatment, such as CPR  A DNR order may be included in other types of advance directives  · Medical directive: This covers the care that you want if you are in a coma, near death, or unable to make decisions for yourself  You can list the treatments you want for each condition  Treatment may include pain medicine, surgery, blood transfusions, dialysis, IV or tube feedings, and a ventilator (breathing machine)  · Values history: This document has questions about your views, beliefs, and how you feel and think about life  This information can help others choose the care that you would choose  Why are advance directives important? An advance directive helps you control your care  Although spoken wishes may be used, it is better to have your wishes written down  Spoken wishes can be misunderstood, or not followed  Treatments may be given even if you do not want them  An advance directive may make it easier for your family to make difficult choices about your care  © Copyright [employer] 2018 Information is for End User's use only and may not be sold, redistributed or otherwise used for commercial purposes   All illustrations and images included in CareNotes® are the copyrighted property of [employer]  or [facility]

## 2020-02-10 DIAGNOSIS — I10 HYPERTENSION, ESSENTIAL: ICD-10-CM

## 2020-02-10 RX ORDER — LISINOPRIL 20 MG/1
20 TABLET ORAL DAILY
Qty: 90 TABLET | Refills: 1 | Status: SHIPPED | OUTPATIENT
Start: 2020-02-10 | End: 2020-06-09 | Stop reason: SDUPTHER

## 2020-04-15 ENCOUNTER — TELEMEDICINE (OUTPATIENT)
Dept: FAMILY MEDICINE CLINIC | Facility: CLINIC | Age: 85
End: 2020-04-15
Payer: MEDICARE

## 2020-04-15 VITALS
SYSTOLIC BLOOD PRESSURE: 140 MMHG | BODY MASS INDEX: 23.05 KG/M2 | HEIGHT: 64 IN | WEIGHT: 135 LBS | DIASTOLIC BLOOD PRESSURE: 80 MMHG

## 2020-04-15 DIAGNOSIS — I10 ESSENTIAL HYPERTENSION: Primary | ICD-10-CM

## 2020-04-15 PROCEDURE — 99442 PR PHYS/QHP TELEPHONE EVALUATION 11-20 MIN: CPT | Performed by: FAMILY MEDICINE

## 2020-06-09 DIAGNOSIS — I10 HYPERTENSION, ESSENTIAL: ICD-10-CM

## 2020-06-09 RX ORDER — LISINOPRIL 20 MG/1
20 TABLET ORAL DAILY
Qty: 90 TABLET | Refills: 1 | Status: SHIPPED | OUTPATIENT
Start: 2020-06-09 | End: 2020-10-05 | Stop reason: SDUPTHER

## 2020-10-05 DIAGNOSIS — I10 HYPERTENSION, ESSENTIAL: ICD-10-CM

## 2020-10-05 RX ORDER — LISINOPRIL 20 MG/1
20 TABLET ORAL DAILY
Qty: 90 TABLET | Refills: 1 | Status: SHIPPED | OUTPATIENT
Start: 2020-10-05 | End: 2021-02-23 | Stop reason: SDUPTHER

## 2021-02-23 ENCOUNTER — TELEPHONE (OUTPATIENT)
Dept: FAMILY MEDICINE CLINIC | Facility: CLINIC | Age: 86
End: 2021-02-23

## 2021-02-23 DIAGNOSIS — I10 HYPERTENSION, ESSENTIAL: ICD-10-CM

## 2021-02-23 RX ORDER — LISINOPRIL 20 MG/1
20 TABLET ORAL DAILY
Qty: 90 TABLET | Refills: 1 | Status: SHIPPED | OUTPATIENT
Start: 2021-02-23 | End: 2021-06-18 | Stop reason: SDUPTHER

## 2021-02-23 NOTE — TELEPHONE ENCOUNTER
lisinopril (ZESTRIL) 20 mg tablet (Order [id number]    Patient called, she needs Rx sent to [facility] in [place]  Thank you

## 2021-06-18 DIAGNOSIS — I10 HYPERTENSION, ESSENTIAL: ICD-10-CM

## 2021-06-18 RX ORDER — LISINOPRIL 20 MG/1
20 TABLET ORAL DAILY
Qty: 90 TABLET | Refills: 1 | Status: SHIPPED | OUTPATIENT
Start: 2021-06-18 | End: 2021-11-18 | Stop reason: SDUPTHER

## 2021-06-25 ENCOUNTER — APPOINTMENT (OUTPATIENT)
Dept: RADIOLOGY | Facility: CLINIC | Age: 86
End: 2021-06-25
Payer: MEDICARE

## 2021-06-25 ENCOUNTER — OFFICE VISIT (OUTPATIENT)
Dept: OBGYN CLINIC | Facility: CLINIC | Age: 86
End: 2021-06-25
Payer: MEDICARE

## 2021-06-25 VITALS
BODY MASS INDEX: 23.05 KG/M2 | DIASTOLIC BLOOD PRESSURE: 70 MMHG | WEIGHT: 135 LBS | HEIGHT: 64 IN | SYSTOLIC BLOOD PRESSURE: 124 MMHG

## 2021-06-25 DIAGNOSIS — M25.561 CHRONIC PAIN OF BOTH KNEES: ICD-10-CM

## 2021-06-25 DIAGNOSIS — M17.0 PRIMARY OSTEOARTHRITIS OF BOTH KNEES: Primary | ICD-10-CM

## 2021-06-25 DIAGNOSIS — G89.29 CHRONIC PAIN OF BOTH KNEES: ICD-10-CM

## 2021-06-25 DIAGNOSIS — M25.562 CHRONIC PAIN OF BOTH KNEES: ICD-10-CM

## 2021-06-25 PROCEDURE — 20610 DRAIN/INJ JOINT/BURSA W/O US: CPT | Performed by: ORTHOPAEDIC SURGERY

## 2021-06-25 PROCEDURE — 73564 X-RAY EXAM KNEE 4 OR MORE: CPT

## 2021-06-25 PROCEDURE — 99213 OFFICE O/P EST LOW 20 MIN: CPT | Performed by: ORTHOPAEDIC SURGERY

## 2021-06-25 PROCEDURE — 1124F ACP DISCUSS-NO DSCNMKR DOCD: CPT | Performed by: ORTHOPAEDIC SURGERY

## 2021-06-25 RX ORDER — BETAMETHASONE SODIUM PHOSPHATE AND BETAMETHASONE ACETATE 3; 3 MG/ML; MG/ML
6 INJECTION, SUSPENSION INTRA-ARTICULAR; INTRALESIONAL; INTRAMUSCULAR; SOFT TISSUE
Status: COMPLETED | OUTPATIENT
Start: 2021-06-25 | End: 2021-06-25

## 2021-06-25 RX ORDER — LIDOCAINE HYDROCHLORIDE 10 MG/ML
7 INJECTION, SOLUTION INFILTRATION; PERINEURAL
Status: COMPLETED | OUTPATIENT
Start: 2021-06-25 | End: 2021-06-25

## 2021-06-25 RX ADMIN — LIDOCAINE HYDROCHLORIDE 7 ML: 10 INJECTION, SOLUTION INFILTRATION; PERINEURAL at 16:22

## 2021-06-25 RX ADMIN — BETAMETHASONE SODIUM PHOSPHATE AND BETAMETHASONE ACETATE 6 MG: 3; 3 INJECTION, SUSPENSION INTRA-ARTICULAR; INTRALESIONAL; INTRAMUSCULAR; SOFT TISSUE at 16:22

## 2021-06-25 NOTE — PROGRESS NOTES
Assessment:     1  Primary osteoarthritis of both knees    2  Chronic pain of both knees          Plan:     Problem List Items Addressed This Visit        Musculoskeletal and Integument    Primary osteoarthritis of both knees - Primary    Relevant Orders    Large joint arthrocentesis: bilateral knee      Other Visit Diagnoses     Chronic pain of both knees        Relevant Orders    XR knee 4+ vw left injury    XR knee 4+ vw right injury           50-year-old female presents office today for follow-up of bilateral knee osteoarthritis  New knee X-rays reviewed and discussed with the patient, which revealed severe bilateral knee osteoarthritis  The patient wished to receive bilateral cortisone injections today in office  Risks and benefits of cortisone injection were discussed with the patient  The patient tolerated the procedure well as described below  She was instructed to ice the knee for 20 minutes at a time for pain and swelling  Patient was advised that she may repeat cortisone injection in 3 months  The patient may return as needed  Subjective:       Patient ID: Jennifer Chau is a 81 y o  female  Chief Complaint:    50-year-old female presents to office today for follow-up of bilateral knee osteoarthritis  The patient was last seen in office in 2019, when she received her last bilateral cortisone injections  The patient states that the cortisone injections provide her with significant relief for a few months  The patient states that she has been walking for exercise  She is able to ambulate with the assistance of a cane  Patient states that her knees hurt worse with activity is, going up and down stairs, and standing up from a sitting position  The patient denies any new injury or trauma  She denies any numbness or tingling in the lower extremities  The patient wishes to receive another set of bilateral cortisone injections today        Allergy:  Allergies   Allergen Reactions    Tramadol Headache       Medications:  all current active meds have been reviewed    Past Medical History:  Past Medical History:   Diagnosis Date    Allergic rhinitis     Arthritis     Back pain     Colon polyps     GERD (gastroesophageal reflux disease)     Hyperlipidemia     Hypertension        Past Surgical History:  Past Surgical History:   Procedure Laterality Date    DILATION AND CURETTAGE OF UTERUS      TONSILLECTOMY         Family History:  Family History   Problem Relation Age of Onset    Breast cancer Sister     Cancer Brother        Social History:  Social History     Substance and Sexual Activity   Alcohol Use Yes    Alcohol/week: 5 0 standard drinks    Types: 5 Shots of liquor per week    Comment: patient states she drinks out of the bottle daily      Social History     Substance and Sexual Activity   Drug Use No     Social History     Tobacco Use   Smoking Status Former Smoker   Smokeless Tobacco Never Used   Tobacco Comment    quit 50 years ago     Review of Systems   Constitutional: Negative for chills and fever  HENT: Negative for ear pain and sore throat  Eyes: Negative for pain and visual disturbance  Respiratory: Negative for cough and shortness of breath  Cardiovascular: Negative for chest pain and palpitations  Gastrointestinal: Negative for abdominal pain and vomiting  Genitourinary: Negative for dysuria and hematuria  Musculoskeletal: Positive for arthralgias, gait problem and joint swelling  Negative for back pain  Skin: Negative for color change and rash  Neurological: Negative for seizures and syncope  All other systems reviewed and are negative  Objective:  BP Readings from Last 1 Encounters:   06/25/21 124/70      Wt Readings from Last 1 Encounters:   06/25/21 61 2 kg (135 lb)        BMI:   Estimated body mass index is 23 17 kg/m² as calculated from the following:    Height as of this encounter: 5' 4" (1 626 m)      Weight as of this encounter: 61 2 kg (135 lb)  BSA:   Estimated body surface area is 1 65 meters squared as calculated from the following:    Height as of this encounter: 5' 4" (1 626 m)  Weight as of this encounter: 61 2 kg (135 lb)  Physical Exam  Vitals reviewed  Constitutional:       Appearance: Normal appearance  HENT:      Head: Normocephalic and atraumatic  Nose: Nose normal       Mouth/Throat:      Pharynx: Oropharynx is clear  Eyes:      Extraocular Movements: Extraocular movements intact  Pupils: Pupils are equal, round, and reactive to light  Cardiovascular:      Pulses: Normal pulses  Pulmonary:      Effort: Pulmonary effort is normal  No respiratory distress  Abdominal:      Palpations: Abdomen is soft  Musculoskeletal:      Cervical back: Normal range of motion  Right knee: No effusion  Instability Tests: Medial Luiza test negative and lateral Luiza test negative  Left knee: No effusion  Instability Tests: Medial Luiza test negative and lateral Luiza test negative  Skin:     General: Skin is warm and dry  Capillary Refill: Capillary refill takes less than 2 seconds  Neurological:      General: No focal deficit present  Mental Status: She is alert and oriented to person, place, and time  Psychiatric:         Mood and Affect: Mood normal          Behavior: Behavior normal            Right Knee Exam     Tenderness   The patient is experiencing tenderness in the lateral joint line and medial joint line  Range of Motion   Extension: 5   Flexion: 120     Tests   Luiza:  Medial - negative Lateral - negative  Varus: negative Valgus: negative  Drawer:  Anterior - negative    Posterior - negative    Other   Erythema: absent  Scars: absent  Sensation: normal  Pulse: present  Swelling: none  Effusion: no effusion present    Comments:    No lesions, effusion, ecchymosis, erythema, warmth, or other signs of infection  Sensation intact to light touch    Positive crepitation with movement  Full plantar/dorsiflexion  Soft lower extremity compartments  Brisk cap refill in all toes  Left Knee Exam     Tenderness   The patient is experiencing tenderness in the medial joint line and lateral joint line  Range of Motion   Extension: 5   Flexion: 120     Tests   Luiza:  Medial - negative Lateral - negative  Varus: negative Valgus: negative  Drawer:  Anterior - negative     Posterior - negative    Other   Erythema: absent  Scars: absent  Sensation: normal  Pulse: present  Swelling: mild  Effusion: no effusion present    Comments:   No lesions, effusion, ecchymosis, erythema, warmth, or other signs of infection  Sensation intact to light touch  Positive crepitation with movement  Full plantar/dorsiflexion  Soft lower extremity compartments  Brisk cap refill in all toes  I have personally reviewed pertinent films in PACS  X-rays of the left and right knees taken today in office shows severe degenerative joint changes with osteophyte formation  No bony lesions  No fracture or dislocation  Procedures:  Large joint arthrocentesis: bilateral knee  Universal Protocol:  Consent: Verbal consent obtained  Risks and benefits: risks, benefits and alternatives were discussed  Consent given by: patient  Time out: Immediately prior to procedure a "time out" was called to verify the correct patient, procedure, equipment, support staff and site/side marked as required  Timeout called at: 6/25/2021 4:21 PM   Patient understanding: patient states understanding of the procedure being performed  Site marked: the operative site was marked  Radiology Images displayed and confirmed   If images not available, report reviewed: imaging studies available  Patient identity confirmed: verbally with patient    Supporting Documentation  Indications: pain and joint swelling   Procedure Details  Location: knee - bilateral knee  Needle size: 22 G  Ultrasound guidance: no  Approach: anterolateral    Medications (Right): 7 mL lidocaine 1 %; 6 mg betamethasone acetate-betamethasone sodium phosphate 6 (3-3) mg/mLMedications (Left): 7 mL lidocaine 1 %; 6 mg betamethasone acetate-betamethasone sodium phosphate 6 (3-3) mg/mL   Patient tolerance: patient tolerated the procedure well with no immediate complications  Dressing:  Sterile dressing applied

## 2021-06-28 ENCOUNTER — TELEPHONE (OUTPATIENT)
Dept: OBGYN CLINIC | Facility: HOSPITAL | Age: 86
End: 2021-06-28

## 2021-06-28 NOTE — TELEPHONE ENCOUNTER
Patient see Junious Riddles    Patient had shots in bilateral knees on 06/25 and she is feeling much better, she wanted the Dr  to know how she was feeling

## 2021-07-14 ENCOUNTER — OFFICE VISIT (OUTPATIENT)
Dept: PAIN MEDICINE | Facility: CLINIC | Age: 86
End: 2021-07-14
Payer: MEDICARE

## 2021-07-14 ENCOUNTER — APPOINTMENT (OUTPATIENT)
Dept: RADIOLOGY | Facility: CLINIC | Age: 86
End: 2021-07-14
Payer: MEDICARE

## 2021-07-14 VITALS
BODY MASS INDEX: 23.05 KG/M2 | DIASTOLIC BLOOD PRESSURE: 72 MMHG | SYSTOLIC BLOOD PRESSURE: 122 MMHG | TEMPERATURE: 97.9 F | HEIGHT: 64 IN | HEART RATE: 64 BPM | WEIGHT: 135 LBS

## 2021-07-14 DIAGNOSIS — M16.12 PRIMARY OSTEOARTHRITIS OF LEFT HIP: ICD-10-CM

## 2021-07-14 DIAGNOSIS — G89.29 HIP PAIN, CHRONIC, LEFT: ICD-10-CM

## 2021-07-14 DIAGNOSIS — M25.552 HIP PAIN, CHRONIC, LEFT: ICD-10-CM

## 2021-07-14 DIAGNOSIS — G89.29 HIP PAIN, CHRONIC, LEFT: Primary | ICD-10-CM

## 2021-07-14 DIAGNOSIS — M25.552 HIP PAIN, CHRONIC, LEFT: Primary | ICD-10-CM

## 2021-07-14 PROCEDURE — 99214 OFFICE O/P EST MOD 30 MIN: CPT | Performed by: NURSE PRACTITIONER

## 2021-07-14 PROCEDURE — 73502 X-RAY EXAM HIP UNI 2-3 VIEWS: CPT

## 2021-07-14 NOTE — PROGRESS NOTES
Assessment:  1  Hip pain, chronic, left    2  Primary osteoarthritis of left hip        Plan:    While the patient was in the office today, I did have a thorough conversation with the patient regarding their chronic pain syndrome, symptoms, medication regimen, and treatment plan  I discussed with the patient at this point I do feel that it would be beneficial to proceed with an x-ray of the left hip to evaluate the underlying etiology to make sure there is an any significant issues besides the arthritis and to give us an update on what the hip itself looks like  I advised the patient that we will call her once we have the results of hip x-ray and advised her to go downstairs to get the hip x-ray done today before she leaves  The patient was agreeable and verbalized an understanding  I discussed with the patient that we would not proceed with a repeat left greater trochanter bursa injection today because more for pain is in the left groin verses the left greater trochanter bursa and she just had bilateral knee injections last week  I explained to the patient we will tentatively schedule her for a left intra-articular hip joint injection which will give us more time to see the hip x-rays and more time in between the injections as I do not want to introduce too much steroids into her system  The patient was agreeable and verbalized an understanding  The patient is schedule follow-up office visit 3-4 weeks after her left hip injection and we will re-evaluate her pain symptoms and treatment plan options from there  The patient was agreeable and verbalized an understanding  History of Present Illness: The patient is a 81 y o  female last seen on 7/10/2019 who presents for a follow up office visit in regards to Chronic and worsening left hip and groin painsecondary to left hip osteoarthritis    The patient currently reports that since her last office visit as she was seen for her left hip and greater trochanter bursa pain in July 2019 where we had performed the left greater trochanter bursa injection which provided significant relief for several months  The patient reports that with the pandemic and her hope that she would not be alive any more, she was trying to hold off on coming back for re-evaluation as she is now having worsening left groin pain as well as the greater trochanter bursa pain  She reports that it is difficult to stand or walk even with the single cane and presents today for re-evaluation as last week she had seen Dr Ally Martinez of Orthopedics and had knee injections which have somewhat helped as well  She presents today for re-evaluation and denies any recent falls, trauma, and or injury  I have personally reviewed and/or updated the patient's past medical history, past surgical history, family history, social history, current medications, allergies, and vital signs today  Review of Systems:    Review of Systems   Respiratory: Negative for shortness of breath  Cardiovascular: Negative for chest pain  Gastrointestinal: Negative for constipation, diarrhea, nausea and vomiting  Musculoskeletal: Positive for gait problem  Negative for arthralgias, joint swelling (joint stiffness) and myalgias  Skin: Negative for rash  Neurological: Negative for dizziness, seizures and weakness  All other systems reviewed and are negative          Past Medical History:   Diagnosis Date    Allergic rhinitis     Arthritis     Back pain     Colon polyps     GERD (gastroesophageal reflux disease)     Hyperlipidemia     Hypertension        Past Surgical History:   Procedure Laterality Date    DILATION AND CURETTAGE OF UTERUS      TONSILLECTOMY         Family History   Problem Relation Age of Onset    Breast cancer Sister     Cancer Brother        Social History     Occupational History    Not on file   Tobacco Use    Smoking status: Former Smoker    Smokeless tobacco: Never Used    Tobacco comment: quit 50 years ago   Substance and Sexual Activity    Alcohol use: Yes     Alcohol/week: 5 0 standard drinks     Types: 5 Shots of liquor per week     Comment: patient states she drinks out of the bottle daily     Drug use: No    Sexual activity: Not Currently         Current Outpatient Medications:     aspirin 325 mg tablet, Take by mouth , Disp: , Rfl:     lisinopril (ZESTRIL) 20 mg tablet, Take 1 tablet (20 mg total) by mouth daily, Disp: 90 tablet, Rfl: 1    Diapers & Supplies MISC, USE ONE, CHANGE EVERY 4 HOURS AS NEEDED for urine incontinence size large, Disp: 100 each, Rfl: 5    Sanitary Napkins & Tampons (KOTEX MAXI OVERNITE) PADS, USE ONE, CHANGE EVERY 4 HOURS AS NEEDED for urine incontinence, Disp: 100 each, Rfl: 5    Allergies   Allergen Reactions    Tramadol Headache       Physical Exam:    /72 (BP Location: Left arm, Patient Position: Sitting, Cuff Size: Standard)   Pulse 64   Temp 97 9 °F (36 6 °C)   Ht 5' 4" (1 626 m)   Wt 61 2 kg (135 lb)   LMP  (LMP Unknown)   BMI 23 17 kg/m²     Constitutional:normal, well developed, well nourished, alert, in no distress and non-toxic and no overt pain behavior  and underweight  Eyes:anicteric  HEENT:grossly intact  Neck:supple, symmetric, trachea midline and no masses   Pulmonary:even and unlabored  Cardiovascular:No edema or pitting edema present  Skin:Normal without rashes or lesions and well hydrated  Psychiatric:Mood and affect appropriate  Neurologic:Cranial Nerves II-XII grossly intact  Musculoskeletal:The patient's gait is antalgic, limping, painful, but steady with the use of a single cane    Pain was noted upon exam with abduction and internal and external rotation of the left hip upon exam   Significant tenderness with mild palpation of the left groin upon exam       Imaging  XR hip/pelv 2-3 vws left if performed    (Results Pending)   FL spine and pain procedure    (Results Pending)         Orders Placed This Encounter   Procedures    XR hip/pelv 2-3 vws left if performed    FL spine and pain procedure

## 2021-07-16 ENCOUNTER — HOSPITAL ENCOUNTER (OUTPATIENT)
Dept: RADIOLOGY | Facility: CLINIC | Age: 86
Discharge: HOME | End: 2021-07-16
Attending: ANESTHESIOLOGY | Admitting: ANESTHESIOLOGY
Payer: MEDICARE

## 2021-07-16 VITALS
RESPIRATION RATE: 20 BRPM | OXYGEN SATURATION: 94 % | DIASTOLIC BLOOD PRESSURE: 82 MMHG | TEMPERATURE: 97.7 F | SYSTOLIC BLOOD PRESSURE: 171 MMHG | HEART RATE: 62 BPM

## 2021-07-16 DIAGNOSIS — M16.12 PRIMARY OSTEOARTHRITIS OF LEFT HIP: ICD-10-CM

## 2021-07-16 DIAGNOSIS — G89.29 HIP PAIN, CHRONIC, LEFT: ICD-10-CM

## 2021-07-16 DIAGNOSIS — M25.552 HIP PAIN, CHRONIC, LEFT: ICD-10-CM

## 2021-07-16 PROCEDURE — 20610 DRAIN/INJ JOINT/BURSA W/O US: CPT | Performed by: ANESTHESIOLOGY

## 2021-07-16 PROCEDURE — 77002 NEEDLE LOCALIZATION BY XRAY: CPT | Performed by: ANESTHESIOLOGY

## 2021-07-16 PROCEDURE — 77002 NEEDLE LOCALIZATION BY XRAY: CPT

## 2021-07-16 RX ORDER — METHYLPREDNISOLONE ACETATE 80 MG/ML
80 INJECTION, SUSPENSION INTRA-ARTICULAR; INTRALESIONAL; INTRAMUSCULAR; PARENTERAL; SOFT TISSUE ONCE
Status: COMPLETED | OUTPATIENT
Start: 2021-07-16 | End: 2021-07-16

## 2021-07-16 RX ADMIN — LIDOCAINE HYDROCHLORIDE 2 ML: 20 INJECTION, SOLUTION EPIDURAL; INFILTRATION; INTRACAUDAL at 15:41

## 2021-07-16 RX ADMIN — IOHEXOL 1 ML: 300 INJECTION, SOLUTION INTRAVENOUS at 15:41

## 2021-07-16 RX ADMIN — METHYLPREDNISOLONE ACETATE 80 MG: 80 INJECTION, SUSPENSION INTRA-ARTICULAR; INTRALESIONAL; INTRAMUSCULAR; SOFT TISSUE at 15:41

## 2021-07-16 NOTE — DISCHARGE INSTRUCTIONS

## 2021-07-16 NOTE — H&P
History of Present Illness:  The patient is a 81 y o  female who presents with complaints of left hip    Patient Active Problem List   Diagnosis    Allergic rhinitis    Back pain    Bilateral low back pain without sciatica    Colon polyps    Generalized osteoarthritis of multiple sites    GERD (gastroesophageal reflux disease)    Greater trochanteric bursitis of both hips    Hyperlipidemia    Hypertension    Primary osteoarthritis of both knees    Lumbar spondylosis    Hip pain, chronic, left    Chronic pain syndrome    Primary osteoarthritis of left hip       Past Medical History:   Diagnosis Date    Allergic rhinitis     Arthritis     Back pain     Colon polyps     GERD (gastroesophageal reflux disease)     Hyperlipidemia     Hypertension        Past Surgical History:   Procedure Laterality Date    DILATION AND CURETTAGE OF UTERUS      TONSILLECTOMY           Current Outpatient Medications:     aspirin 325 mg tablet, Take by mouth , Disp: , Rfl:     Diapers & Supplies MISC, USE ONE, CHANGE EVERY 4 HOURS AS NEEDED for urine incontinence size large, Disp: 100 each, Rfl: 5    lisinopril (ZESTRIL) 20 mg tablet, Take 1 tablet (20 mg total) by mouth daily, Disp: 90 tablet, Rfl: 1    Sanitary Napkins & Tampons (KOTEX MAXI OVERNITE) PADS, USE ONE, CHANGE EVERY 4 HOURS AS NEEDED for urine incontinence, Disp: 100 each, Rfl: 5    Current Facility-Administered Medications:     iohexol (OMNIPAQUE) 300 mg/mL injection 50 mL, 50 mL, Intra-articular, Once, Cody Mon DO    lidocaine (PF) (XYLOCAINE-MPF) 2 % injection 5 mL, 5 mL, Intra-articular, Once, Cody Mon DO    methylPREDNISolone acetate (DEPO-MEDROL) injection 80 mg, 80 mg, Intra-articular, Once, Cody Mon DO    Allergies   Allergen Reactions    Tramadol Headache       Physical Exam:   General: Awake, Alert, Oriented x 3, Mood and affect appropriate  Respiratory: Respirations even and unlabored  Cardiovascular: Peripheral pulses intact; no edema  Musculoskeletal Exam:  Decreased range of motion left hip    ASA Score: II         Assessment:  Hip pain, hip arthritis    Plan: Left Hip Injection

## 2021-07-19 ENCOUNTER — TELEPHONE (OUTPATIENT)
Dept: PAIN MEDICINE | Facility: CLINIC | Age: 86
End: 2021-07-19

## 2021-07-19 NOTE — TELEPHONE ENCOUNTER
Can you please call the patient and let her know that her left hip x-rays showed bone on bone SEVERE deg OA  Lets see how she does from the Left Hip injection she had on Friday and go from there, but if this doesn't help then she may have to seriously consider a surgical consultation with orthopedics

## 2021-07-23 ENCOUNTER — TELEPHONE (OUTPATIENT)
Dept: PAIN MEDICINE | Facility: CLINIC | Age: 86
End: 2021-07-23

## 2021-07-23 NOTE — TELEPHONE ENCOUNTER
Pt reports 80-90% improvement post inj  Pt has no pain       S/p Left Hip Injection 7/16 F/u 8/17

## 2021-08-09 NOTE — TELEPHONE ENCOUNTER
Pt calling to confirm pt has 100% improvement in her pain s/p L hip inj on 7/16/2021. Pt w/ no medications per [facility], cancelled her 8/17 fu ov w/ DG. Pt stated that she will cb if pain returns / questions arise. Pt was very appreciative. Advised pt, will make SL aware.

## 2021-11-18 ENCOUNTER — TELEPHONE (OUTPATIENT)
Dept: FAMILY MEDICINE CLINIC | Facility: CLINIC | Age: 86
End: 2021-11-18

## 2021-11-18 DIAGNOSIS — I10 HYPERTENSION, ESSENTIAL: ICD-10-CM

## 2021-11-18 RX ORDER — LISINOPRIL 20 MG/1
20 TABLET ORAL DAILY
Qty: 90 TABLET | Refills: 1 | Status: SHIPPED | OUTPATIENT
Start: 2021-11-18

## 2023-07-31 ENCOUNTER — TELEPHONE (OUTPATIENT)
Dept: OBGYN CLINIC | Facility: HOSPITAL | Age: 88
End: 2023-07-31

## 2023-07-31 ENCOUNTER — TELEPHONE (OUTPATIENT)
Dept: PAIN MEDICINE | Facility: MEDICAL CENTER | Age: 88
End: 2023-07-31

## 2023-07-31 NOTE — TELEPHONE ENCOUNTER
PRE OP INSTRUCTIONS:     -If you are on prescription blood thinners, you may have to hold the medication for several days before the procedure. Please call the office to    discuss medication holds at [phone].  -Do not eat or drink ONE HOUR prior to your procedure. If you are diabetic, may follow regular breakfast/lunch schedule and take usual    diabetic medications.  -Lumbar( low back) procedure, please wear comfortable slacks/pants.  -Cervical (neck) procedure, please wear a shirt/blouse that is easy to remove.  -A  is required to take you home form your procedure.  -Continue all to take prescribed medication the day of your procedure, including blood pressure medications.  -If you are prescribe antibiotics, have an active infection or have an open wound, please contact the office at [phone].  -Please refrain from any vaccinations two weeks before and two weeks after injection.  -Insurance authorization received in not a guarantee of payment per your insurance company's authorization disclaimer and it is    your responsibility to verify your benefits. -If you have any questions about the instructions, please call me at [phone]    Hold medication  x _ full days prior, last dose on _  Patient advised to hold medication from Date till their appointment at that time instructions to restart will be given. Patient stated verbal understanding. Aware that nursing will call her to review hold dates as well.

## 2023-07-31 NOTE — TELEPHONE ENCOUNTER
S/W pt, pt is requesting a repeat left hip injection, last injection 7/16/21. Pt has the same left hip pain she had prior to last injection that provided great relief. Nurse advised pt she would need OVS as it has been over 2 years since she was evaluated, pt was not happy with nurse, nurse spoke with SL and SL advised nurse okay to repeat injection to left hip. Nurse advised pt procedure  to call pt to schedule for left hip injection. Pt verbalized understanding and appreciative of call.

## 2023-07-31 NOTE — TELEPHONE ENCOUNTER
Caller: Gibran Dailey PT    Doctor: Dr Cathye Phoenix     Reason for call: Schedule a procedure     Call back#: [phone]

## 2023-07-31 NOTE — TELEPHONE ENCOUNTER
Caller: Gibran Dailey PT     Doctor: Dr Jonatan Avila     Reason for call: Schedule a procedure      Call back#: [phone]

## 2023-08-01 ENCOUNTER — OFFICE VISIT (OUTPATIENT)
Dept: OBGYN CLINIC | Facility: CLINIC | Age: 88
End: 2023-08-01
Payer: MEDICARE

## 2023-08-01 VITALS
DIASTOLIC BLOOD PRESSURE: 84 MMHG | SYSTOLIC BLOOD PRESSURE: 152 MMHG | HEIGHT: 64 IN | WEIGHT: 135 LBS | BODY MASS INDEX: 23.05 KG/M2

## 2023-08-01 DIAGNOSIS — M17.0 PRIMARY OSTEOARTHRITIS OF BOTH KNEES: Primary | ICD-10-CM

## 2023-08-01 PROCEDURE — 99213 OFFICE O/P EST LOW 20 MIN: CPT | Performed by: ORTHOPAEDIC SURGERY

## 2023-08-01 PROCEDURE — 20610 DRAIN/INJ JOINT/BURSA W/O US: CPT | Performed by: ORTHOPAEDIC SURGERY

## 2023-08-01 RX ORDER — BETAMETHASONE SODIUM PHOSPHATE AND BETAMETHASONE ACETATE 3; 3 MG/ML; MG/ML
6 INJECTION, SUSPENSION INTRA-ARTICULAR; INTRALESIONAL; INTRAMUSCULAR; SOFT TISSUE
Status: COMPLETED | OUTPATIENT
Start: 2023-08-01 | End: 2023-08-01

## 2023-08-01 RX ADMIN — BETAMETHASONE SODIUM PHOSPHATE AND BETAMETHASONE ACETATE 6 MG: 3; 3 INJECTION, SUSPENSION INTRA-ARTICULAR; INTRALESIONAL; INTRAMUSCULAR; SOFT TISSUE at 14:15

## 2023-08-01 NOTE — PROGRESS NOTES
Assessment:     1. Primary osteoarthritis of both knees        Plan:     Problem List Items Addressed This Visit        Musculoskeletal and Integument    Primary osteoarthritis of both knees - Primary     Findings consistent with severe bilateral knee osteoarthritis. Patient was given bilateral knee cortisone injections, tolerated well, cold compress today. Can repeat CSI in 3-4 months if needed. If no significant relief then she can call in 6 weeks and gel injections can be ordered. Stationary bike, elliptical, walking for low impact exercise. Tylenol, topical creams for pain control. See back as needed. All patient's questions were answered to her satisfaction. This note is created using dictation transcription. It may contain typographical errors, grammatical errors, improperly dictated words, background noise and other errors. Relevant Medications    betamethasone acetate-betamethasone sodium phosphate (CELESTONE) injection 6 mg (Completed)    betamethasone acetate-betamethasone sodium phosphate (CELESTONE) injection 6 mg (Completed)    Other Relevant Orders    Large joint arthrocentesis: bilateral knee (Completed)       Subjective:     Patient ID: Zeinab Haines is a 81 y.o. female. Chief Complaint:  28-year-old female presents to office today for follow-up of bilateral knee osteoarthritis. The patient was last seen in office in 2021, when she received her last bilateral cortisone injections. The patient states that the cortisone injections provide her with significant relief for a few months before wearing off. She is using walker to ambulate. Pain is diffuse in both knee's. She is able to ambulate with the assistance of a cane. Patient states that her knees hurt worse with activity is, going up and down stairs, and standing up from a sitting position. The patient denies any new injury or trauma. She denies any numbness or tingling in the lower extremities.   The patient wishes to receive another set of bilateral cortisone injections today. Allergy:  Allergies   Allergen Reactions   • Tramadol Headache     Medications:  all current active meds have been reviewed  Past Medical History:  Past Medical History:   Diagnosis Date   • Allergic rhinitis    • Arthritis    • Back pain    • Colon polyps    • GERD (gastroesophageal reflux disease)    • Hyperlipidemia    • Hypertension      Past Surgical History:  Past Surgical History:   Procedure Laterality Date   • DILATION AND CURETTAGE OF UTERUS     • TONSILLECTOMY       Family History:  Family History   Problem Relation Age of Onset   • Breast cancer Sister    • Cancer Brother      Social History:  Social History     Substance and Sexual Activity   Alcohol Use Yes   • Alcohol/week: 5.0 standard drinks of alcohol   • Types: 5 Shots of liquor per week    Comment: patient states she drinks out of the bottle daily      Social History     Substance and Sexual Activity   Drug Use No     Social History     Tobacco Use   Smoking Status Former   Smokeless Tobacco Never   Tobacco Comments    quit 50 years ago     Review of Systems   Constitutional: Negative for chills and fever. HENT: Negative for ear pain and sore throat. Eyes: Negative for pain and visual disturbance. Respiratory: Negative for cough and shortness of breath. Cardiovascular: Negative for chest pain and palpitations. Gastrointestinal: Negative for abdominal pain and vomiting. Genitourinary: Negative for dysuria and hematuria. Musculoskeletal: Positive for arthralgias (bilateral knee) and gait problem (Ambulate with a walker). Negative for back pain. Skin: Negative for color change and rash. Neurological: Negative for seizures and syncope. Psychiatric/Behavioral: Negative. All other systems reviewed and are negative.         Objective:  BP Readings from Last 1 Encounters:   08/01/23 152/84      Wt Readings from Last 1 Encounters:   08/01/23 61.2 kg (135 lb)      BMI: Estimated body mass index is 23.17 kg/m² as calculated from the following:    Height as of this encounter: 5' 4" (1.626 m). Weight as of this encounter: 61.2 kg (135 lb). BSA:   Estimated body surface area is 1.65 meters squared as calculated from the following:    Height as of this encounter: 5' 4" (1.626 m). Weight as of this encounter: 61.2 kg (135 lb). Physical Exam  Vitals and nursing note reviewed. Constitutional:       Appearance: Normal appearance. She is well-developed. HENT:      Head: Normocephalic and atraumatic. Right Ear: External ear normal.      Left Ear: External ear normal.   Eyes:      Extraocular Movements: Extraocular movements intact. Conjunctiva/sclera: Conjunctivae normal.   Pulmonary:      Effort: Pulmonary effort is normal.   Musculoskeletal:         General: Tenderness (bilateral knee arthralgia ) present. Cervical back: Neck supple. Right knee: No effusion. Left knee: No effusion. Skin:     General: Skin is warm and dry. Neurological:      Mental Status: She is alert and oriented to person, place, and time. Deep Tendon Reflexes: Reflexes are normal and symmetric. Psychiatric:         Mood and Affect: Mood normal.         Behavior: Behavior normal.       Right Knee Exam     Muscle Strength   The patient has normal right knee strength. Tenderness   The patient is experiencing tenderness in the medial joint line and lateral joint line. Range of Motion   Extension: -5   Flexion: 120     Tests   Varus: negative Valgus: negative  Patellar apprehension: negative    Other   Erythema: absent  Scars: absent  Sensation: normal  Pulse: present  Swelling: none  Effusion: no effusion present    Comments:  Crepitation with motion of patella       Left Knee Exam     Muscle Strength   The patient has normal left knee strength. Tenderness   The patient is experiencing tenderness in the lateral joint line and medial joint line.     Range of Motion Extension: -5   Flexion: 120     Tests   Varus: negative Valgus: negative  Patellar apprehension: negative    Other   Erythema: absent  Scars: absent  Sensation: normal  Pulse: present  Swelling: none  Effusion: no effusion present    Comments:  Crepitation with motion of patella             no new imaging to review      Large joint arthrocentesis: bilateral knee  Universal Protocol:  Consent: Verbal consent obtained.   Risks and benefits: risks, benefits and alternatives were discussed  Consent given by: patient  Patient understanding: patient states understanding of the procedure being performed  Site marked: the operative site was marked  Patient identity confirmed: verbally with patient    Supporting Documentation  Indications: pain   Procedure Details  Location: knee - bilateral knee  Preparation: Patient was prepped and draped in the usual sterile fashion  Needle size: 22 G  Ultrasound guidance: no  Approach: anterolateral    Medications (Right): 6 mg betamethasone acetate-betamethasone sodium phosphate 6 (3-3) mg/mL(7 ML 0.25%marcaine injection intra articular )  Medications (Left): 6 mg betamethasone acetate-betamethasone sodium phosphate 6 (3-3) mg/mL (7 ML 0.25%marcaine injection intra articular )    Patient tolerance: patient tolerated the procedure well with no immediate complications  Dressing:  Sterile dressing applied          Scribe Attestation    I,:  Elkin Singer am acting as a scribe while in the presence of the attending physician.:       I,:  Sharon Garcia MD personally performed the services described in this documentation    as scribed in my presence.:

## 2023-08-01 NOTE — ASSESSMENT & PLAN NOTE
Findings consistent with severe bilateral knee osteoarthritis. Patient was given bilateral knee cortisone injections, tolerated well, cold compress today. Can repeat CSI in 3-4 months if needed. If no significant relief then she can call in 6 weeks and gel injections can be ordered. Stationary bike, elliptical, walking for low impact exercise. Tylenol, topical creams for pain control. See back as needed. All patient's questions were answered to her satisfaction. This note is created using dictation transcription. It may contain typographical errors, grammatical errors, improperly dictated words, background noise and other errors.

## 2023-08-07 ENCOUNTER — HOSPITAL ENCOUNTER (OUTPATIENT)
Dept: RADIOLOGY | Facility: CLINIC | Age: 88
Discharge: HOME | End: 2023-08-07
Admitting: ANESTHESIOLOGY
Payer: MEDICARE

## 2023-08-07 VITALS
OXYGEN SATURATION: 94 % | HEART RATE: 65 BPM | SYSTOLIC BLOOD PRESSURE: 163 MMHG | DIASTOLIC BLOOD PRESSURE: 70 MMHG | TEMPERATURE: 97.3 F | RESPIRATION RATE: 18 BRPM

## 2023-08-07 DIAGNOSIS — M16.12 PRIMARY OSTEOARTHRITIS OF LEFT HIP: ICD-10-CM

## 2023-08-07 DIAGNOSIS — G89.29 HIP PAIN, CHRONIC, LEFT: ICD-10-CM

## 2023-08-07 DIAGNOSIS — M25.552 HIP PAIN, CHRONIC, LEFT: ICD-10-CM

## 2023-08-07 PROCEDURE — 20610 DRAIN/INJ JOINT/BURSA W/O US: CPT | Performed by: ANESTHESIOLOGY

## 2023-08-07 PROCEDURE — 77002 NEEDLE LOCALIZATION BY XRAY: CPT | Performed by: ANESTHESIOLOGY

## 2023-08-07 PROCEDURE — 77002 NEEDLE LOCALIZATION BY XRAY: CPT

## 2023-08-07 RX ORDER — ROPIVACAINE HYDROCHLORIDE 2 MG/ML
2 INJECTION, SOLUTION EPIDURAL; INFILTRATION; PERINEURAL ONCE
Status: COMPLETED | OUTPATIENT
Start: 2023-08-07 | End: 2023-08-07

## 2023-08-07 RX ORDER — METHYLPREDNISOLONE ACETATE 80 MG/ML
80 INJECTION, SUSPENSION INTRA-ARTICULAR; INTRALESIONAL; INTRAMUSCULAR; PARENTERAL; SOFT TISSUE ONCE
Status: COMPLETED | OUTPATIENT
Start: 2023-08-07 | End: 2023-08-07

## 2023-08-07 RX ADMIN — IOHEXOL 1 ML: 300 INJECTION, SOLUTION INTRAVENOUS at 14:52

## 2023-08-07 RX ADMIN — METHYLPREDNISOLONE ACETATE 80 MG: 80 INJECTION, SUSPENSION INTRA-ARTICULAR; INTRALESIONAL; INTRAMUSCULAR; SOFT TISSUE at 14:58

## 2023-08-07 RX ADMIN — ROPIVACAINE HYDROCHLORIDE 2 ML: 2 INJECTION, SOLUTION EPIDURAL; INFILTRATION at 14:58

## 2023-08-07 NOTE — DISCHARGE INSTRUCTIONS

## 2023-08-07 NOTE — H&P
History of Present Illness: The patient is a 81 y.o. female who presents with complaints of hip pain. Past Medical History:   Diagnosis Date   • Allergic rhinitis    • Arthritis    • Back pain    • Colon polyps    • GERD (gastroesophageal reflux disease)    • Hyperlipidemia    • Hypertension        Past Surgical History:   Procedure Laterality Date   • DILATION AND CURETTAGE OF UTERUS     • TONSILLECTOMY           Current Outpatient Medications:   •  aspirin 325 mg tablet, Take by mouth , Disp: , Rfl:   •  Diapers & Supplies MISC, USE ONE, CHANGE EVERY 4 HOURS AS NEEDED for urine incontinence size large, Disp: 100 each, Rfl: 5  •  lisinopril (ZESTRIL) 20 mg tablet, Take 1 tablet (20 mg total) by mouth daily (Patient not taking: Reported on 8/1/2023), Disp: 90 tablet, Rfl: 1  •  Sanitary Napkins & Tampons (KOTEX MAXI OVERNITE) PADS, USE ONE, CHANGE EVERY 4 HOURS AS NEEDED for urine incontinence, Disp: 100 each, Rfl: 5    Current Facility-Administered Medications:   •  iohexol (OMNIPAQUE) 300 mg/mL injection 1 mL, 1 mL, Intra-articular, Once, Cody Mon DO  •  methylPREDNISolone acetate (DEPO-MEDROL) injection 80 mg, 80 mg, Intra-articular, Once, Cody Mon DO  •  ropivacaine (NAROPIN) injection 2 mL, 2 mL, Intra-articular, Once, Cody Mon DO    Allergies   Allergen Reactions   • Tramadol Headache       Physical Exam:   General: Awake, Alert, Oriented x 3, Mood and affect appropriate  Respiratory: Respirations even and unlabored  Cardiovascular: Peripheral pulses intact; no edema  Musculoskeletal Exam: Decreased range of motion left hip    ASA Score: II         Assessment:   1. Hip pain, chronic, left    2.  Primary osteoarthritis of left hip        Plan: Left Hip Injection

## 2023-08-10 ENCOUNTER — TELEPHONE (OUTPATIENT)
Age: 88
End: 2023-08-10

## 2023-08-10 NOTE — TELEPHONE ENCOUNTER
Caller: patient    Doctor: LISA    Reason for call: patient calling stated is feeling well, no pain after procedure    Call back#:

## 2023-08-14 ENCOUNTER — TELEPHONE (OUTPATIENT)
Dept: PAIN MEDICINE | Facility: CLINIC | Age: 88
End: 2023-08-14

## 2023-12-15 ENCOUNTER — HOSPITAL ENCOUNTER (INPATIENT)
Facility: HOSPITAL | Age: 88
LOS: 3 days | Discharge: NURSING, REHAB OR LONG-TERM CARE | DRG: 690 | End: 2023-12-19
Attending: EMERGENCY MEDICINE | Admitting: INTERNAL MEDICINE
Payer: MEDICARE

## 2023-12-15 ENCOUNTER — APPOINTMENT (EMERGENCY)
Dept: CT IMAGING | Facility: HOSPITAL | Age: 88
DRG: 690 | End: 2023-12-15
Payer: MEDICARE

## 2023-12-15 DIAGNOSIS — R53.1 WEAKNESS: ICD-10-CM

## 2023-12-15 DIAGNOSIS — M25.552 LEFT HIP PAIN: Primary | ICD-10-CM

## 2023-12-15 DIAGNOSIS — I48.91 ATRIAL FIBRILLATION (HCC): ICD-10-CM

## 2023-12-15 DIAGNOSIS — N30.00 ACUTE CYSTITIS WITHOUT HEMATURIA: ICD-10-CM

## 2023-12-15 DIAGNOSIS — F10.20 ETOH DEPENDENCE (HCC): ICD-10-CM

## 2023-12-15 PROBLEM — E87.6 HYPOKALEMIA: Status: ACTIVE | Noted: 2023-12-15

## 2023-12-15 PROBLEM — R26.2 AMBULATORY DYSFUNCTION: Status: ACTIVE | Noted: 2023-12-15

## 2023-12-15 PROBLEM — I16.0 HYPERTENSIVE URGENCY: Status: ACTIVE | Noted: 2023-12-15

## 2023-12-15 LAB
ALBUMIN SERPL BCP-MCNC: 3.5 G/DL (ref 3.5–5)
ALP SERPL-CCNC: 60 U/L (ref 34–104)
ALT SERPL W P-5'-P-CCNC: 6 U/L (ref 7–52)
ANION GAP SERPL CALCULATED.3IONS-SCNC: 6 MMOL/L
AST SERPL W P-5'-P-CCNC: 15 U/L (ref 13–39)
BACTERIA UR QL AUTO: ABNORMAL /HPF
BASOPHILS # BLD AUTO: 0.05 THOUSANDS/ÂΜL (ref 0–0.1)
BASOPHILS NFR BLD AUTO: 1 % (ref 0–1)
BILIRUB SERPL-MCNC: 0.34 MG/DL (ref 0.2–1)
BILIRUB UR QL STRIP: NEGATIVE
BUN SERPL-MCNC: 11 MG/DL (ref 5–25)
CALCIUM SERPL-MCNC: 9.4 MG/DL (ref 8.4–10.2)
CHLORIDE SERPL-SCNC: 104 MMOL/L (ref 96–108)
CLARITY UR: ABNORMAL
CO2 SERPL-SCNC: 29 MMOL/L (ref 21–32)
COLOR UR: ABNORMAL
CREAT SERPL-MCNC: 0.69 MG/DL (ref 0.6–1.3)
EOSINOPHIL # BLD AUTO: 0.05 THOUSAND/ÂΜL (ref 0–0.61)
EOSINOPHIL NFR BLD AUTO: 1 % (ref 0–6)
ERYTHROCYTE [DISTWIDTH] IN BLOOD BY AUTOMATED COUNT: 13.8 % (ref 11.6–15.1)
GFR SERPL CREATININE-BSD FRML MDRD: 78 ML/MIN/1.73SQ M
GLUCOSE SERPL-MCNC: 91 MG/DL (ref 65–140)
GLUCOSE UR STRIP-MCNC: NEGATIVE MG/DL
HCT VFR BLD AUTO: 40.1 % (ref 34.8–46.1)
HGB BLD-MCNC: 13.1 G/DL (ref 11.5–15.4)
HGB UR QL STRIP.AUTO: ABNORMAL
HYALINE CASTS #/AREA URNS LPF: ABNORMAL /LPF
IMM GRANULOCYTES # BLD AUTO: 0.05 THOUSAND/UL (ref 0–0.2)
IMM GRANULOCYTES NFR BLD AUTO: 1 % (ref 0–2)
KETONES UR STRIP-MCNC: ABNORMAL MG/DL
LEUKOCYTE ESTERASE UR QL STRIP: ABNORMAL
LYMPHOCYTES # BLD AUTO: 1.2 THOUSANDS/ÂΜL (ref 0.6–4.47)
LYMPHOCYTES NFR BLD AUTO: 20 % (ref 14–44)
MCH RBC QN AUTO: 28.2 PG (ref 26.8–34.3)
MCHC RBC AUTO-ENTMCNC: 32.7 G/DL (ref 31.4–37.4)
MCV RBC AUTO: 86 FL (ref 82–98)
MONOCYTES # BLD AUTO: 0.48 THOUSAND/ÂΜL (ref 0.17–1.22)
MONOCYTES NFR BLD AUTO: 8 % (ref 4–12)
NEUTROPHILS # BLD AUTO: 4.2 THOUSANDS/ÂΜL (ref 1.85–7.62)
NEUTS SEG NFR BLD AUTO: 69 % (ref 43–75)
NITRITE UR QL STRIP: POSITIVE
NON-SQ EPI CELLS URNS QL MICRO: ABNORMAL /HPF
NRBC BLD AUTO-RTO: 0 /100 WBCS
PH UR STRIP.AUTO: 6 [PH]
PLATELET # BLD AUTO: 286 THOUSANDS/UL (ref 149–390)
PMV BLD AUTO: 10 FL (ref 8.9–12.7)
POTASSIUM SERPL-SCNC: 3.2 MMOL/L (ref 3.5–5.3)
PROT SERPL-MCNC: 6.6 G/DL (ref 6.4–8.4)
PROT UR STRIP-MCNC: ABNORMAL MG/DL
RBC # BLD AUTO: 4.65 MILLION/UL (ref 3.81–5.12)
RBC #/AREA URNS AUTO: ABNORMAL /HPF
SODIUM SERPL-SCNC: 139 MMOL/L (ref 135–147)
SP GR UR STRIP.AUTO: >=1.03 (ref 1–1.03)
UROBILINOGEN UR STRIP-ACNC: 2 MG/DL
WBC # BLD AUTO: 6.03 THOUSAND/UL (ref 4.31–10.16)
WBC #/AREA URNS AUTO: ABNORMAL /HPF

## 2023-12-15 PROCEDURE — 99223 1ST HOSP IP/OBS HIGH 75: CPT | Performed by: PHYSICIAN ASSISTANT

## 2023-12-15 PROCEDURE — 87186 SC STD MICRODIL/AGAR DIL: CPT | Performed by: PHYSICIAN ASSISTANT

## 2023-12-15 PROCEDURE — 36415 COLL VENOUS BLD VENIPUNCTURE: CPT | Performed by: PHYSICIAN ASSISTANT

## 2023-12-15 PROCEDURE — 74176 CT ABD & PELVIS W/O CONTRAST: CPT

## 2023-12-15 PROCEDURE — 99285 EMERGENCY DEPT VISIT HI MDM: CPT | Performed by: PHYSICIAN ASSISTANT

## 2023-12-15 PROCEDURE — 99285 EMERGENCY DEPT VISIT HI MDM: CPT

## 2023-12-15 PROCEDURE — 96375 TX/PRO/DX INJ NEW DRUG ADDON: CPT

## 2023-12-15 PROCEDURE — 85025 COMPLETE CBC W/AUTO DIFF WBC: CPT | Performed by: PHYSICIAN ASSISTANT

## 2023-12-15 PROCEDURE — 87147 CULTURE TYPE IMMUNOLOGIC: CPT | Performed by: PHYSICIAN ASSISTANT

## 2023-12-15 PROCEDURE — 87086 URINE CULTURE/COLONY COUNT: CPT | Performed by: PHYSICIAN ASSISTANT

## 2023-12-15 PROCEDURE — 80053 COMPREHEN METABOLIC PANEL: CPT | Performed by: PHYSICIAN ASSISTANT

## 2023-12-15 PROCEDURE — 87077 CULTURE AEROBIC IDENTIFY: CPT | Performed by: PHYSICIAN ASSISTANT

## 2023-12-15 PROCEDURE — 81001 URINALYSIS AUTO W/SCOPE: CPT | Performed by: PHYSICIAN ASSISTANT

## 2023-12-15 PROCEDURE — 96366 THER/PROPH/DIAG IV INF ADDON: CPT

## 2023-12-15 PROCEDURE — 96365 THER/PROPH/DIAG IV INF INIT: CPT

## 2023-12-15 RX ORDER — METHYLPREDNISOLONE SODIUM SUCCINATE 125 MG/2ML
125 INJECTION, POWDER, LYOPHILIZED, FOR SOLUTION INTRAMUSCULAR; INTRAVENOUS ONCE
Status: COMPLETED | OUTPATIENT
Start: 2023-12-15 | End: 2023-12-15

## 2023-12-15 RX ORDER — CEPHALEXIN 250 MG/1
500 CAPSULE ORAL ONCE
Status: DISCONTINUED | OUTPATIENT
Start: 2023-12-15 | End: 2023-12-15

## 2023-12-15 RX ORDER — LISINOPRIL 20 MG/1
20 TABLET ORAL ONCE
Status: COMPLETED | OUTPATIENT
Start: 2023-12-15 | End: 2023-12-15

## 2023-12-15 RX ORDER — HYDROMORPHONE HCL/PF 1 MG/ML
0.5 SYRINGE (ML) INJECTION ONCE
Status: COMPLETED | OUTPATIENT
Start: 2023-12-15 | End: 2023-12-15

## 2023-12-15 RX ORDER — ENOXAPARIN SODIUM 100 MG/ML
40 INJECTION SUBCUTANEOUS DAILY
Status: CANCELLED | OUTPATIENT
Start: 2023-12-16

## 2023-12-15 RX ORDER — ACETAMINOPHEN 325 MG/1
650 TABLET ORAL ONCE
Status: DISCONTINUED | OUTPATIENT
Start: 2023-12-15 | End: 2023-12-16

## 2023-12-15 RX ORDER — POTASSIUM CHLORIDE 20 MEQ/1
20 TABLET, EXTENDED RELEASE ORAL ONCE
Status: COMPLETED | OUTPATIENT
Start: 2023-12-15 | End: 2023-12-15

## 2023-12-15 RX ORDER — SODIUM CHLORIDE, SODIUM GLUCONATE, SODIUM ACETATE, POTASSIUM CHLORIDE, MAGNESIUM CHLORIDE, SODIUM PHOSPHATE, DIBASIC, AND POTASSIUM PHOSPHATE .53; .5; .37; .037; .03; .012; .00082 G/100ML; G/100ML; G/100ML; G/100ML; G/100ML; G/100ML; G/100ML
1000 INJECTION, SOLUTION INTRAVENOUS ONCE
Status: COMPLETED | OUTPATIENT
Start: 2023-12-15 | End: 2023-12-16

## 2023-12-15 RX ORDER — CEFTRIAXONE 1 G/50ML
1000 INJECTION, SOLUTION INTRAVENOUS EVERY 24 HOURS
Status: DISCONTINUED | OUTPATIENT
Start: 2023-12-15 | End: 2023-12-19 | Stop reason: HOSPADM

## 2023-12-15 RX ORDER — HYDRALAZINE HYDROCHLORIDE 20 MG/ML
5 INJECTION INTRAMUSCULAR; INTRAVENOUS EVERY 6 HOURS PRN
Status: DISCONTINUED | OUTPATIENT
Start: 2023-12-15 | End: 2023-12-18

## 2023-12-15 RX ORDER — CEPHALEXIN 250 MG/1
500 CAPSULE ORAL EVERY 6 HOURS SCHEDULED
Status: DISCONTINUED | OUTPATIENT
Start: 2023-12-16 | End: 2023-12-15

## 2023-12-15 RX ADMIN — POTASSIUM CHLORIDE 20 MEQ: 1500 TABLET, EXTENDED RELEASE ORAL at 23:55

## 2023-12-15 RX ADMIN — HYDROMORPHONE HYDROCHLORIDE 0.5 MG: 1 INJECTION, SOLUTION INTRAMUSCULAR; INTRAVENOUS; SUBCUTANEOUS at 19:41

## 2023-12-15 RX ADMIN — SODIUM CHLORIDE, SODIUM GLUCONATE, SODIUM ACETATE, POTASSIUM CHLORIDE, MAGNESIUM CHLORIDE, SODIUM PHOSPHATE, DIBASIC, AND POTASSIUM PHOSPHATE 1000 ML: .53; .5; .37; .037; .03; .012; .00082 INJECTION, SOLUTION INTRAVENOUS at 19:49

## 2023-12-15 RX ADMIN — LISINOPRIL 20 MG: 20 TABLET ORAL at 23:55

## 2023-12-15 RX ADMIN — METHYLPREDNISOLONE SODIUM SUCCINATE 125 MG: 125 INJECTION, POWDER, FOR SOLUTION INTRAMUSCULAR; INTRAVENOUS at 19:41

## 2023-12-15 RX ADMIN — CEFTRIAXONE 1000 MG: 1 INJECTION, SOLUTION INTRAVENOUS at 23:55

## 2023-12-15 NOTE — Clinical Note
Case was discussed with Naun and the patient's admission status was agreed to be Admission Status: observation status to the service of Dr. Dunne .

## 2023-12-16 PROBLEM — I48.91 ATRIAL FIBRILLATION WITH RVR (HCC): Status: ACTIVE | Noted: 2023-12-16

## 2023-12-16 LAB
ANION GAP SERPL CALCULATED.3IONS-SCNC: 6 MMOL/L
ATRIAL RATE: 128 BPM
BASOPHILS # BLD AUTO: 0.01 THOUSANDS/ÂΜL (ref 0–0.1)
BASOPHILS NFR BLD AUTO: 0 % (ref 0–1)
BUN SERPL-MCNC: 14 MG/DL (ref 5–25)
CALCIUM SERPL-MCNC: 8.7 MG/DL (ref 8.4–10.2)
CHLORIDE SERPL-SCNC: 107 MMOL/L (ref 96–108)
CO2 SERPL-SCNC: 26 MMOL/L (ref 21–32)
CREAT SERPL-MCNC: 0.63 MG/DL (ref 0.6–1.3)
EOSINOPHIL # BLD AUTO: 0 THOUSAND/ÂΜL (ref 0–0.61)
EOSINOPHIL NFR BLD AUTO: 0 % (ref 0–6)
ERYTHROCYTE [DISTWIDTH] IN BLOOD BY AUTOMATED COUNT: 13.7 % (ref 11.6–15.1)
GFR SERPL CREATININE-BSD FRML MDRD: 80 ML/MIN/1.73SQ M
GLUCOSE P FAST SERPL-MCNC: 142 MG/DL (ref 65–99)
GLUCOSE SERPL-MCNC: 142 MG/DL (ref 65–140)
HCT VFR BLD AUTO: 36.4 % (ref 34.8–46.1)
HGB BLD-MCNC: 12.1 G/DL (ref 11.5–15.4)
IMM GRANULOCYTES # BLD AUTO: 0.02 THOUSAND/UL (ref 0–0.2)
IMM GRANULOCYTES NFR BLD AUTO: 1 % (ref 0–2)
LYMPHOCYTES # BLD AUTO: 0.6 THOUSANDS/ÂΜL (ref 0.6–4.47)
LYMPHOCYTES NFR BLD AUTO: 19 % (ref 14–44)
MAGNESIUM SERPL-MCNC: 2 MG/DL (ref 1.9–2.7)
MCH RBC QN AUTO: 28.5 PG (ref 26.8–34.3)
MCHC RBC AUTO-ENTMCNC: 33.2 G/DL (ref 31.4–37.4)
MCV RBC AUTO: 86 FL (ref 82–98)
MONOCYTES # BLD AUTO: 0.05 THOUSAND/ÂΜL (ref 0.17–1.22)
MONOCYTES NFR BLD AUTO: 2 % (ref 4–12)
NEUTROPHILS # BLD AUTO: 2.53 THOUSANDS/ÂΜL (ref 1.85–7.62)
NEUTS SEG NFR BLD AUTO: 78 % (ref 43–75)
NRBC BLD AUTO-RTO: 0 /100 WBCS
P AXIS: 59 DEGREES
PLATELET # BLD AUTO: 269 THOUSANDS/UL (ref 149–390)
PMV BLD AUTO: 10.2 FL (ref 8.9–12.7)
POTASSIUM SERPL-SCNC: 3.2 MMOL/L (ref 3.5–5.3)
PR INTERVAL: 172 MS
QRS AXIS: 34 DEGREES
QRSD INTERVAL: 92 MS
QT INTERVAL: 262 MS
QTC INTERVAL: 382 MS
RBC # BLD AUTO: 4.24 MILLION/UL (ref 3.81–5.12)
SODIUM SERPL-SCNC: 139 MMOL/L (ref 135–147)
T WAVE AXIS: 239 DEGREES
VENTRICULAR RATE: 128 BPM
WBC # BLD AUTO: 3.21 THOUSAND/UL (ref 4.31–10.16)

## 2023-12-16 PROCEDURE — 93005 ELECTROCARDIOGRAM TRACING: CPT

## 2023-12-16 PROCEDURE — 80048 BASIC METABOLIC PNL TOTAL CA: CPT | Performed by: INTERNAL MEDICINE

## 2023-12-16 PROCEDURE — 99223 1ST HOSP IP/OBS HIGH 75: CPT | Performed by: INTERNAL MEDICINE

## 2023-12-16 PROCEDURE — 83735 ASSAY OF MAGNESIUM: CPT | Performed by: PHYSICIAN ASSISTANT

## 2023-12-16 PROCEDURE — 85025 COMPLETE CBC W/AUTO DIFF WBC: CPT | Performed by: INTERNAL MEDICINE

## 2023-12-16 PROCEDURE — 99233 SBSQ HOSP IP/OBS HIGH 50: CPT | Performed by: FAMILY MEDICINE

## 2023-12-16 RX ORDER — LANOLIN ALCOHOL/MO/W.PET/CERES
100 CREAM (GRAM) TOPICAL DAILY
Status: DISCONTINUED | OUTPATIENT
Start: 2023-12-16 | End: 2023-12-19 | Stop reason: HOSPADM

## 2023-12-16 RX ORDER — LIDOCAINE 50 MG/G
1 PATCH TOPICAL DAILY
Status: DISCONTINUED | OUTPATIENT
Start: 2023-12-16 | End: 2023-12-19 | Stop reason: HOSPADM

## 2023-12-16 RX ORDER — LISINOPRIL 20 MG/1
20 TABLET ORAL DAILY
Status: DISCONTINUED | OUTPATIENT
Start: 2023-12-16 | End: 2023-12-16

## 2023-12-16 RX ORDER — ACETAMINOPHEN 325 MG/1
975 TABLET ORAL EVERY 8 HOURS SCHEDULED
Status: DISCONTINUED | OUTPATIENT
Start: 2023-12-16 | End: 2023-12-19 | Stop reason: HOSPADM

## 2023-12-16 RX ORDER — ENOXAPARIN SODIUM 100 MG/ML
1 INJECTION SUBCUTANEOUS ONCE
Status: COMPLETED | OUTPATIENT
Start: 2023-12-16 | End: 2023-12-16

## 2023-12-16 RX ORDER — FOLIC ACID 1 MG/1
1 TABLET ORAL DAILY
Status: DISCONTINUED | OUTPATIENT
Start: 2023-12-16 | End: 2023-12-19 | Stop reason: HOSPADM

## 2023-12-16 RX ORDER — ONDANSETRON 2 MG/ML
4 INJECTION INTRAMUSCULAR; INTRAVENOUS EVERY 6 HOURS PRN
Status: DISCONTINUED | OUTPATIENT
Start: 2023-12-16 | End: 2023-12-19 | Stop reason: HOSPADM

## 2023-12-16 RX ORDER — METOPROLOL TARTRATE 1 MG/ML
2.5 INJECTION, SOLUTION INTRAVENOUS EVERY 6 HOURS PRN
Status: DISCONTINUED | OUTPATIENT
Start: 2023-12-16 | End: 2023-12-18

## 2023-12-16 RX ADMIN — APIXABAN 2.5 MG: 2.5 TABLET, FILM COATED ORAL at 17:13

## 2023-12-16 RX ADMIN — CEFTRIAXONE 1000 MG: 1 INJECTION, SOLUTION INTRAVENOUS at 22:33

## 2023-12-16 RX ADMIN — Medication 12.5 MG: at 01:51

## 2023-12-16 RX ADMIN — ENOXAPARIN SODIUM 60 MG: 100 INJECTION SUBCUTANEOUS at 03:55

## 2023-12-16 RX ADMIN — MULTIPLE VITAMINS W/ MINERALS TAB 1 TABLET: TAB ORAL at 08:44

## 2023-12-16 RX ADMIN — SODIUM CHLORIDE 500 ML: 0.9 INJECTION, SOLUTION INTRAVENOUS at 10:46

## 2023-12-16 RX ADMIN — Medication 100 MG: at 08:44

## 2023-12-16 RX ADMIN — ACETAMINOPHEN 975 MG: 325 TABLET, FILM COATED ORAL at 05:46

## 2023-12-16 RX ADMIN — ACETAMINOPHEN 975 MG: 325 TABLET, FILM COATED ORAL at 21:11

## 2023-12-16 RX ADMIN — FOLIC ACID 1 MG: 1 TABLET ORAL at 08:44

## 2023-12-16 NOTE — ED PROVIDER NOTES
"History  Chief Complaint   Patient presents with    Weakness - Generalized     X1 month     Pt is an 87 yo F with PMH of arthritis, GERD, HTN, HLD current every day drinker, who presents with her daughter for evaluation of left hip pain.  Pt is a poor historian, but it appears that pt has had bilateral hip pain for several years that has waxed and waned, but became worse over the past week.  Pt lives alone and she has been unable to ambulate.   PT is always incontinent of urine and also suffers with chronic constipation.  She denies acute worsening of either condition.  She has taken 5 aspirin several times a day for her pain without improvement.  She denies dysuria, but states \"it always comes out\".  She specifically denies fever, chills, nausea, vomiting, numbness or tingling.          Prior to Admission Medications   Prescriptions Last Dose Informant Patient Reported? Taking?   Diapers & Supplies MISC  Self No No   Sig: USE ONE, CHANGE EVERY 4 HOURS AS NEEDED for urine incontinence size large   Sanitary Napkins & Tampons (KOTEX MAXI OVERNITE) PADS  Self No No   Sig: USE ONE, CHANGE EVERY 4 HOURS AS NEEDED for urine incontinence   aspirin 325 mg tablet  Self Yes No   Sig: Take by mouth    lisinopril (ZESTRIL) 20 mg tablet   No No   Sig: Take 1 tablet (20 mg total) by mouth daily      Facility-Administered Medications: None       Past Medical History:   Diagnosis Date    Allergic rhinitis     Arthritis     Back pain     Colon polyps     GERD (gastroesophageal reflux disease)     Hyperlipidemia     Hypertension        Past Surgical History:   Procedure Laterality Date    DILATION AND CURETTAGE OF UTERUS      TONSILLECTOMY         Family History   Problem Relation Age of Onset    Breast cancer Sister     Cancer Brother      I have reviewed and agree with the history as documented.    E-Cigarette/Vaping     E-Cigarette/Vaping Substances     Social History     Tobacco Use    Smoking status: Former    Smokeless tobacco: " Never    Tobacco comments:     quit 50 years ago   Substance Use Topics    Alcohol use: Yes     Alcohol/week: 5.0 standard drinks of alcohol     Types: 5 Shots of liquor per week     Comment: patient states she drinks out of the bottle daily     Drug use: No       Review of Systems   Constitutional: Negative.  Negative for chills and fever.   HENT: Negative.  Negative for ear pain and sore throat.    Eyes: Negative.  Negative for pain and visual disturbance.   Respiratory: Negative.  Negative for cough and shortness of breath.    Cardiovascular: Negative.  Negative for chest pain and palpitations.   Gastrointestinal: Negative.  Negative for abdominal pain and vomiting.   Endocrine: Negative.    Genitourinary:  Positive for frequency. Negative for dysuria and hematuria.   Musculoskeletal:  Positive for arthralgias and gait problem. Negative for back pain.   Skin:  Negative for color change and rash.   Allergic/Immunologic: Negative.    Neurological:  Negative for seizures and syncope.   Hematological: Negative.    Psychiatric/Behavioral: Negative.     All other systems reviewed and are negative.      Physical Exam  Physical Exam  Vitals and nursing note reviewed.   Constitutional:       General: She is not in acute distress.     Appearance: She is well-developed. She is ill-appearing.   HENT:      Head: Normocephalic and atraumatic.      Nose: Nose normal.      Mouth/Throat:      Mouth: Mucous membranes are moist.   Eyes:      General: No scleral icterus.     Conjunctiva/sclera: Conjunctivae normal.      Pupils: Pupils are equal, round, and reactive to light.   Cardiovascular:      Rate and Rhythm: Normal rate and regular rhythm.      Pulses: Normal pulses.      Heart sounds: Normal heart sounds. No murmur heard.  Pulmonary:      Effort: Pulmonary effort is normal. No respiratory distress.      Breath sounds: Normal breath sounds.   Abdominal:      General: Abdomen is flat. Bowel sounds are normal.      Palpations:  Abdomen is soft.      Tenderness: There is no abdominal tenderness. There is no right CVA tenderness or left CVA tenderness.   Musculoskeletal:         General: Tenderness present. No swelling or signs of injury.      Cervical back: Normal range of motion and neck supple.      Right lower leg: Tenderness present. No swelling. No edema.      Left lower leg: Tenderness present. No swelling. No edema.      Comments: Bilateral lower extremity skin discoloration   Skin:     General: Skin is warm and dry.      Capillary Refill: Capillary refill takes less than 2 seconds.   Neurological:      Mental Status: She is alert.   Psychiatric:         Mood and Affect: Mood normal.         Vital Signs  ED Triage Vitals [12/15/23 1820]   Temperature Pulse Respirations Blood Pressure SpO2   98.1 °F (36.7 °C) 69 18 (!) 188/113 98 %      Temp Source Heart Rate Source Patient Position - Orthostatic VS BP Location FiO2 (%)   Oral Monitor -- Left arm --      Pain Score       No Pain           Vitals:    12/15/23 2100 12/15/23 2200 12/15/23 2300 12/15/23 2340   BP: (!) 186/77 (!) 172/77 124/80 124/80   Pulse: 58 (!) 54  (!) 136         Visual Acuity      ED Medications  Medications   acetaminophen (TYLENOL) tablet 650 mg (650 mg Oral Not Given 12/15/23 1950)   lisinopril (ZESTRIL) tablet 20 mg (has no administration in time range)   potassium chloride (K-DUR,KLOR-CON) CR tablet 20 mEq (has no administration in time range)   hydrALAZINE (APRESOLINE) injection 5 mg (has no administration in time range)   cefTRIAXone (ROCEPHIN) IVPB (premix in dextrose) 1,000 mg 50 mL (has no administration in time range)   multi-electrolyte (PLASMALYTE-A/ISOLYTE-S PH 7.4) IV solution 1,000 mL (1,000 mL Intravenous New Bag 12/15/23 1949)   methylPREDNISolone sodium succinate (Solu-MEDROL) injection 125 mg (125 mg Intravenous Given 12/15/23 1941)   HYDROmorphone (DILAUDID) injection 0.5 mg (0.5 mg Intravenous Given 12/15/23 1941)       Diagnostic  Studies  Results Reviewed       Procedure Component Value Units Date/Time    Urine Microscopic [[id number]]  (Abnormal) Collected: 12/15/23 2240    Lab Status: Final result Specimen: Urine, Straight Cath Updated: 12/15/23 2301     RBC, UA Innumerable /hpf      WBC, UA Innumerable /hpf      Epithelial Cells       Field obscured, unable to enumerate     /hpf     Bacteria, UA       Field obscured, unable to enumerate     /hpf     Hyaline Casts, UA       Field obscured, unable to enumerate     /lpf    Urine culture [[id number]] Collected: 12/15/23 2240    Lab Status: In process Specimen: Urine, Straight Cath Updated: 12/15/23 2300    UA w Reflex to Microscopic w Reflex to Culture [[id number]]  (Abnormal) Collected: 12/15/23 2240    Lab Status: Final result Specimen: Urine, Straight Cath Updated: 12/15/23 2249     Color, UA Brown     Clarity, UA Cloudy     Specific Gravity, UA >=1.030     pH, UA 6.0     Leukocytes, UA Large     Nitrite, UA Positive     Protein,  (2+) mg/dl      Glucose, UA Negative mg/dl      Ketones, UA Trace mg/dl      Urobilinogen, UA 2.0 mg/dl      Bilirubin, UA Negative     Occult Blood, UA Large    Comprehensive metabolic panel [[id number]]  (Abnormal) Collected: 12/15/23 1950    Lab Status: Final result Specimen: Blood from Arm, Left Updated: 12/15/23 2023     Sodium 139 mmol/L      Potassium 3.2 mmol/L      Chloride 104 mmol/L      CO2 29 mmol/L      ANION GAP 6 mmol/L      BUN 11 mg/dL      Creatinine 0.69 mg/dL      Glucose 91 mg/dL      Calcium 9.4 mg/dL      AST 15 U/L      ALT 6 U/L      Alkaline Phosphatase 60 U/L      Total Protein 6.6 g/dL      Albumin 3.5 g/dL      Total Bilirubin 0.34 mg/dL      eGFR 78 ml/min/1.73sq m     Narrative:      National Kid[employer] Foundation guidelines for Chronic Kidney Disease (CKD):     Stage 1 with normal or high GFR (GFR > 90 mL/min/1.73 square meters)    Stage 2 Mild CKD (GFR = 60-89 mL/min/1.73 square meters)    Stage 3A Moderate CKD (GFR = 45-59  mL/min/1.73 square meters)    Stage 3B Moderate CKD (GFR = 30-44 mL/min/1.73 square meters)    Stage 4 Severe CKD (GFR = 15-29 mL/min/1.73 square meters)    Stage 5 End Stage CKD (GFR <15 mL/min/1.73 square meters)  Note: GFR calculation is accurate only with a steady state creatinine    CBC and differential [[id number]] Collected: 12/15/23 1950    Lab Status: Final result Specimen: Blood from Arm, Left Updated: 12/15/23 2007     WBC 6.03 Thousand/uL      RBC 4.65 Million/uL      Hemoglobin 13.1 g/dL      Hematocrit 40.1 %      MCV 86 fL      MCH 28.2 pg      MCHC 32.7 g/dL      RDW 13.8 %      MPV 10.0 fL      Platelets 286 Thousands/uL      nRBC 0 /100 WBCs      Neutrophils Relative 69 %      Immat GRANS % 1 %      Lymphocytes Relative 20 %      Monocytes Relative 8 %      Eosinophils Relative 1 %      Basophils Relative 1 %      Neutrophils Absolute 4.20 Thousands/µL      Immature Grans Absolute 0.05 Thousand/uL      Lymphocytes Absolute 1.20 Thousands/µL      Monocytes Absolute 0.48 Thousand/µL      Eosinophils Absolute 0.05 Thousand/µL      Basophils Absolute 0.05 Thousands/µL                    CT abdomen pelvis wo contrast   Final Result by Troy Quezada MD (12/15 2123)      Advanced osteoarthritic degenerative changes involving the left hip with acetabulum protrusio noted.      Mild bladder wall thickening. Correlate for possible cystitis.      Gallstones without surrounding inflammation.                        Workstation performed: [id number]                    Procedures  Procedures         ED Course  ED Course as of 12/15/23 2351   Fri Dec 15, 2023   2047 Potassium(!): 3.2   2049 Potassium(!): 3.2                               SBIRT 22yo+      Flowsheet Row Most Recent Value   Initial Alcohol Screen: US AUDIT-C     1. How often do you have a drink containing alcohol? 0 Filed at: 12/15/2023 8609   2. How many drinks containing alcohol do you have on a typical day you are drinking?  0 Filed at: 12/15/2023  2159   3b. FEMALE Any Age, or MALE 65+: How often do you have 4 or more drinks on one occassion? 0 Filed at: 12/15/2023 2159   Audit-C Score 0 Filed at: 12/15/2023 2159   CHRISTA: How many times in the past year have you...    Used an illegal drug or used a prescription medication for non-medical reasons? Never Filed at: 12/15/2023 2159                      Medical Decision Making  Pt with generalized weakness and left hip pain  Pt unable to ambulate 2/2 to pain  Weakness likely related to untreated UTI  Pt to be admitted for pt/ot evaluation and likely social work consult    Problems Addressed:  EtOH dependence (HCC): acute illness or injury  Left hip pain: acute illness or injury  Weakness: acute illness or injury    Amount and/or Complexity of Data Reviewed  Labs: ordered. Decision-making details documented in ED Course.  Radiology: ordered.    Risk  OTC drugs.  Prescription drug management.  Decision regarding hospitalization.             Disposition  Final diagnoses:   Left hip pain   Weakness   EtOH dependence (HCC)     Time reflects when diagnosis was documented in both MDM as applicable and the Disposition within this note       Time User Action Codes Description Comment    12/15/2023 10:18 PM Uzma Herrera [M25.552] Left hip pain     12/15/2023 10:18 PM Uzma Herrera Add [R53.1] Weakness     12/15/2023 10:18 PM Uzma Herrera Add [F10.20] EtOH dependence (HCC)           ED Disposition       ED Disposition   Admit    Condition   Stable    Date/Time   Fri Dec 15, 2023 2217    Comment   Case was discussed with Naun and the patient's admission status was agreed to be Admission Status: observation status to the service of Dr. Lira               Follow-up Information    None         Current Discharge Medication List        CONTINUE these medications which have NOT CHANGED    Details   aspirin 325 mg tablet Take by mouth       Diapers & Supplies MISC USE ONE, CHANGE EVERY 4 HOURS AS NEEDED for  urine incontinence size large  Qty: 100 each, Refills: 5    Associated Diagnoses: Urinary incontinence, unspecified type      lisinopril (ZESTRIL) 20 mg tablet Take 1 tablet (20 mg total) by mouth daily  Qty: 90 tablet, Refills: 1    Associated Diagnoses: Hypertension, essential      Sanitary Napkins & Tampons (KOTEX MAXI OVERNITE) PADS USE ONE, CHANGE EVERY 4 HOURS AS NEEDED for urine incontinence  Qty: 100 each, Refills: 5    Associated Diagnoses: Urinary incontinence, unspecified type             No discharge procedures on file.    PDMP Review       None            ED Provider  Electronically Signed by             Uzma Herrera PA-C  12/15/23 4254

## 2023-12-16 NOTE — ASSESSMENT & PLAN NOTE
"[facility] care referral faxed to [facility] wound care On 12.7.2020 at 12.37 pm to  fax number [phone]       Refaxed to [facility] on 12.8.2020 at 2.58 pm to fax number [phone]      Refaxed to [facility] on 12.9.2020 at 9.23 Am to fax number [phone]       Your fax has been successfully sent to [phone] at [phone].  ------------------------------------------------------------  From: [id number]  ------------------------------------------------------------    12/9/2020 9:38:12 AM Transmission Record   Sent to [id number] with remote ID "[id number]          "   Result: (0/339;0/0) Success   Page record: 1 - 11   Elapsed time: 05:07 on channel 48    " Secondary to left hip pain, alcohol use, UTI  Lives home alone  PT/OT evaluations  Supportive care and safe ambulation with fall precautions

## 2023-12-16 NOTE — CONSULTS
Consultation - Cardiology   Amanda Gonzalez 88 y.o. female MRN: [id number]  Unit/Bed#: -01 Encounter: [id number]    Assessment:  Principal Problem:    Atrial fibrillation with RVR (HCC)  Active Problems:    Primary osteoarthritis of left hip    Hypertensive urgency    Acute cystitis without hematuria    Ambulatory dysfunction    Hypokalemia    New diagnosis of atrial fibrillation. She is currently quite tachycardic. Unclear if this may be precipitated by potential UTI.    Patient tells me that she is not interested in taking medications, although she has been compliant with taking the medications provided to her by the nurse.    Plan:    Recommend continuing the metoprolol. If blood pressure does not allow this, then start digoxin 250 mcg every 6 hours x 4 doses then 125 mcg daily. Not end-stage, but if limited by blood pressure this may be our best option.    Patient does not seem amenable at all to anticoagulation as I discussed it with her. She wants to take aspirin only as her medication in general.    Obtain echocardiogram      History of Present Illness   Physician Requesting Consult: Linda Bill MD  Reason for Consult / Principal Problem: New Afib  HPI: Amanda Gonzalez is a 88 y.o. year old female who presents with ambulatory dysfunction, concern for UTI, and is found to be in rapid atrial fibrillation on the floor.    Patient quite tangential in her history. She says she lives with her cats. She refuses medications, although the nurse tells me that she has been taking the medications that are provided to her.    She denies any prior cardiac evaluation except with her primary care physician who the patient tells me tried to put her on antihypertensive medication previously because her blood pressure was high, but she was not interested in taking it.    She denies any palpitations. She is quite rapid in atrial fibrillation. Her blood pressure is lower end currently may not be able to get her  metoprolol.    Inpatient consult to Cardiology  Consult performed by: Juan Pablo Meade MD  Consult ordered by: Carlos Montesinos PA-C          Review of Systems:  Poor balance. Frequent urination. Back Pain.  Otherwise, patient denies. ROS unreliable.      Historical Information   Past Medical History:   Diagnosis Date    Allergic rhinitis     Arthritis     Back pain     Colon polyps     GERD (gastroesophageal reflux disease)     Hyperlipidemia     Hypertension      Past Surgical History:   Procedure Laterality Date    DILATION AND CURETTAGE OF UTERUS      TONSILLECTOMY       Social History     Substance and Sexual Activity   Alcohol Use Yes    Alcohol/week: 5.0 standard drinks of alcohol    Types: 5 Shots of liquor per week    Comment: patient states she drinks out of the bottle daily      Social History     Substance and Sexual Activity   Drug Use No     Social History     Tobacco Use   Smoking Status Former   Smokeless Tobacco Never   Tobacco Comments    quit 50 years ago     Family History: non-contributory    Meds/Allergies     Current Facility-Administered Medications:     acetaminophen (TYLENOL) tablet 975 mg, 975 mg, Oral, Q8H [facility], Carlos Montesinos PA-C, 975 mg at 12/16/23 0546    cefTRIAXone (ROCEPHIN) IVPB (premix in dextrose) 1,000 mg 50 mL, 1,000 mg, Intravenous, Q24H, Carlos Montesinos PA-C, Last Rate: 100 mL/hr at 12/15/23 2355, 1,000 mg at 12/15/23 2355    folic acid (FOLVITE) tablet 1 mg, 1 mg, Oral, Daily, Carlos Montesinos PA-C, 1 mg at 12/16/23 0844    hydrALAZINE (APRESOLINE) injection 5 mg, 5 mg, Intravenous, Q6H PRN, Carlos Montesinos PA-C    lidocaine (LIDODERM) 5 % patch 1 patch, 1 patch, Topical, Daily, Carlos Montesinos PA-C    metoprolol (LOPRESSOR) injection 2.5 mg, 2.5 mg, Intravenous, Q6H PRN, Linda Bill MD    metoprolol tartrate (LOPRESSOR) partial tablet 12.5 mg, 12.5 mg, Oral, Q12H JITENDRA, Carlos Montesinos PA-C, 12.5 mg at 12/16/23 0151    multivitamin-minerals (CENTRUM) tablet 1 tablet,  "1 tablet, Oral, Daily, Carlos Montesinos PA-C, 1 tablet at 12/16/23 0844    ondansetron (ZOFRAN) injection 4 mg, 4 mg, Intravenous, Q6H PRN, Carlos Montesinos PA-C    thiamine tablet 100 mg, 100 mg, Oral, Daily, Carlos Montesinos PA-C, 100 mg at 12/16/23 0844  Allergies   Allergen Reactions    Tramadol Headache       Objective   Vitals: Blood pressure 123/93, pulse (!) 127, temperature 98 °F (36.7 °C), resp. rate 15, height 5' 5\" (1.651 m), weight 55 kg (121 lb 4.1 oz), SpO2 97%., Body mass index is 20.18 kg/m².,   Orthostatic Blood Pressures      Flowsheet Row Most Recent Value   Blood Pressure 123/93 filed at 12/16/2023 1412              Intake/Output Summary (Last 24 hours) at 12/16/2023 1421  Last data filed at 12/15/2023 2239  Gross per 24 hour   Intake --   Output 100 ml   Net -100 ml       Invasive Devices       Peripheral Intravenous Line  Duration             Peripheral IV 12/16/23 Left;Ventral (anterior) Forearm <1 day                    Physical Exam:  GEN: Amanda Gonzalez is an elderly female. Excitable.  HEENT: =poor dentition.  NECK: supple, no carotid bruits   HEART: irregularly irregular. Tachycardic. + ANGELITA  LUNGS: clear to auscultation bilaterally; no wheezes, rales, or rhonchi   ABDOMEN: normal bowel sounds, soft, no tenderness, no distention  EXTREMITIES: peripheral pulses normal; no clubbing, cyanosis, or edema  NEURO: unable to assess.  SKIN: normal without suspicious lesions on exposed skin    Lab Results:         Results from last 7 days   Lab Units 12/16/23  0401 12/15/23  1950   WBC Thousand/uL 3.21* 6.03   HEMOGLOBIN g/dL 12.1 13.1   HEMATOCRIT % 36.4 40.1   PLATELETS Thousands/uL 269 286         Results from last 7 days   Lab Units 12/16/23  0401 12/15/23  1950   POTASSIUM mmol/L 3.2* 3.2*   CHLORIDE mmol/L 107 104   CO2 mmol/L 26 29   BUN mg/dL 14 11   CREATININE mg/dL 0.63 0.69   CALCIUM mg/dL 8.7 9.4   ALK PHOS U/L  --  60   ALT U/L  --  6*   AST U/L  --  15         Results from last 7 " days   Lab Units 12/16/23  0401   MAGNESIUM mg/dL 2.0       Imaging: I have personally reviewed pertinent films in PACS  CT abdomen pelvis wo contrast    Result Date: 12/15/2023  Narrative: CT ABDOMEN AND PELVIS WITHOUT IV CONTRAST INDICATION:   left hip pain. COMPARISON: CT scan from 2/21/2008. TECHNIQUE:  CT examination of the abdomen and pelvis was performed without intravenous contrast. Multiplanar 2D reformatted images were created from the source data. This examination, like all CT scans performed in the [facility], was performed utilizing techniques to minimize radiation dose exposure, including the use of iterative reconstruction and automated exposure control. Radiation dose length product (DLP) for this visit:  430.67 mGy-cm Enteric contrast was not administered. FINDINGS: ABDOMEN LOWER CHEST:  No clinically significant abnormality identified in the visualized lower chest. LIVER/BILIARY TREE:  Unremarkable. GALLBLADDER: There are gallstone(s) within the gallbladder, without pericholecystic inflammatory changes. SPLEEN: Calcified granulomata noted within the spleen unchanged. PANCREAS:  Unremarkable. ADRENAL GLANDS:  Unremarkable. KIDNEYS/URETERS: Left-sided extrarenal pelvis. No hydronephrosis on either side. STOMACH AND BOWEL: There is colonic diverticulosis without evidence of acute diverticulitis. No bowel obstruction. APPENDIX: A normal appendix was visualized. ABDOMINOPELVIC CAVITY:  No ascites.  No pneumoperitoneum.  No lymphadenopathy. VESSELS:  Unremarkable for patient's age. PELVIS REPRODUCTIVE ORGANS:  Unremarkable for patient's age. URINARY BLADDER: Mild bladder wall thickening. ABDOMINAL WALL/INGUINAL REGIONS:  Unremarkable. OSSEOUS STRUCTURES: Advanced osteoarthritic degenerative change of the left hip with acetabulum protrusio noted. Spinal degenerative changes with anterolisthesis of L4 and L5.     Impression: Advanced osteoarthritic degenerative changes involving the left  hip with acetabulum protrusio noted. Mild bladder wall thickening. Correlate for possible cystitis. Gallstones without surrounding inflammation. Workstation performed: [id number]       EKG: Afib with RVR    Telemetry: Afib, RVR

## 2023-12-16 NOTE — PLAN OF CARE
Problem: Potential for Falls  Goal: Patient will remain free of falls  Description: INTERVENTIONS:  - Educate patient/family on patient safety including physical limitations  - Instruct patient to call for assistance with activity   - Consult OT/PT to assist with strengthening/mobility   - Keep Call bell within reach  - Keep bed low and locked with side rails adjusted as appropriate  - Keep care items and personal belongings within reach  - Initiate and maintain comfort rounds  - Make Fall Risk Sign visible to staff  - Offer Toileting every  Hours, in advance of need  - Initiate/Maintain alarm  - Obtain necessary fall risk management equipment:   - Apply yellow socks and bracelet for high fall risk patients  - Consider moving patient to room near nurses station  Outcome: Progressing     Problem: PAIN - ADULT  Goal: Verbalizes/displays adequate comfort level or baseline comfort level  Description: Interventions:  - Encourage patient to monitor pain and request assistance  - Assess pain using appropriate pain scale  - Administer analgesics based on type and severity of pain and evaluate response  - Implement non-pharmacological measures as appropriate and evaluate response  - Consider cultural and social influences on pain and pain management  - Notify physician/advanced practitioner if interventions unsuccessful or patient reports new pain  Outcome: Progressing

## 2023-12-16 NOTE — H&P
[facility]  H&P  Name: Amanda Gonzalez 88 y.o. female I MRN: [id number]  Unit/Bed#: LUISA I Date of Admission: 12/15/2023   Date of Service: 12/15/2023 I Hospital Day: 0      Assessment/Plan   * Ambulatory dysfunction  Assessment & Plan  Secondary to left hip pain, alcohol use, UTI  Lives home alone  PT/OT evaluations  Supportive care and safe ambulation with fall precautions    Hypertensive urgency  Assessment & Plan  Blood pressure 172/77 upon admission  Reports she is not taking her lisinopril any longer, unclear why  Resume lisinopril 20 mg daily  IV hydralazine ordered.  Monitor blood pressure trend    Acute cystitis without hematuria  Assessment & Plan  Straight cath UA suspicious for UTI  Fortunately not septic  CT with imaging suspicious for cystitis  Started on IV ceftriaxone  Follow-up on urine culture    Hypokalemia  Assessment & Plan  Potassium 3.2 on admit  Replete and monitor    Primary osteoarthritis of left hip  Assessment & Plan  Longstanding history of left hip arthritis with pain and ambulatory dysfunction as a result  Scheduled Tylenol and lidocaine patch ordered.  Can consider low-dose gabapentin as well  PT OT eval's pending         VTE Pharmacologic Prophylaxis: VTE Score: 5 High Risk (Score >/= 5) - Pharmacological DVT Prophylaxis Ordered: enoxaparin (Lovenox). Sequential Compression Devices Ordered.  Code Status: full code  Discussion with family: Patient declined call to .     Anticipated Length of Stay: Patient will be admitted on an observation basis with an anticipated length of stay of less than 2 midnights secondary to ambulatory dysfunction due to hip pain.    Total Time for Visit, including Counseling / Coordination of Care: 75 minutes Greater than 50% of this total time spent on direct patient counseling and coordination of care.    Chief Complaint: hip pain, unable to walk    History of Present Illness:  Amanda Gonzalez is a 88 y.o.  female with a PMH of hypertension and daily alcohol use who presents with chief complaint of left hip pain and inability to ambulate.  No specific hematuria or dysuria.  Lives home alone.  Drinks 1 shot of alcohol daily.  Pain was so severe today that she was unable to walk and thus came into the ER.    Review of Systems:  Review of Systems   Constitutional:  Positive for activity change (less). Negative for appetite change, chills, fatigue and fever.   HENT:  Negative for congestion, rhinorrhea, sinus pressure and sore throat.    Eyes:  Negative for photophobia, pain and visual disturbance.   Respiratory:  Negative for cough, shortness of breath and wheezing.    Cardiovascular:  Negative for chest pain, palpitations and leg swelling.   Gastrointestinal:  Negative for abdominal distention, abdominal pain, constipation, diarrhea, nausea and vomiting.   Endocrine: Negative for cold intolerance, heat intolerance, polydipsia and polyuria.   Genitourinary:  Negative for difficulty urinating, dysuria, flank pain, frequency and hematuria.   Musculoskeletal:  Positive for arthralgias (hip). Negative for back pain and joint swelling.   Skin:  Negative for color change, pallor and rash.   Allergic/Immunologic: Negative.    Neurological:  Negative for dizziness, syncope, weakness, light-headedness and headaches.   Hematological: Negative.    Psychiatric/Behavioral: Negative.         Past Medical and Surgical History:   Past Medical History:   Diagnosis Date    Allergic rhinitis     Arthritis     Back pain     Colon polyps     GERD (gastroesophageal reflux disease)     Hyperlipidemia     Hypertension        Past Surgical History:   Procedure Laterality Date    DILATION AND CURETTAGE OF UTERUS      TONSILLECTOMY         Meds/Allergies:  Prior to Admission medications    Medication Sig Start Date End Date Taking? Authorizing Provider   aspirin 325 mg tablet Take by mouth     Historical Provider, MD   Diapers & Supplies MISC USE  ONE, CHANGE EVERY 4 HOURS AS NEEDED for urine incontinence size large 12/18/19   Leila Mcknight DO   lisinopril (ZESTRIL) 20 mg tablet Take 1 tablet (20 mg total) by mouth daily  Patient not taking: Reported on 8/1/2023 11/18/21   Leila Mcknight DO   Sanitary Napkins & Tampons (KOTEX MAXI OVERNITE) PADS USE ONE, CHANGE EVERY 4 HOURS AS NEEDED for urine incontinence 12/18/19   Leila Mcknight DO     I have reviewed home medications with patient personally.    Allergies:   Allergies   Allergen Reactions    Tramadol Headache       Social History:  Marital Status:    Occupation: non-contributory  Patient Pre-hospital Living Situation: Home, Alone  Patient Pre-hospital Level of Mobility: unable to be assessed at time of evaluation  Patient Pre-hospital Diet Restrictions: none  Substance Use History:   Social History     Substance and Sexual Activity   Alcohol Use Yes    Alcohol/week: 5.0 standard drinks of alcohol    Types: 5 Shots of liquor per week    Comment: patient states she drinks out of the bottle daily      Social History     Tobacco Use   Smoking Status Former   Smokeless Tobacco Never   Tobacco Comments    quit 50 years ago     Social History     Substance and Sexual Activity   Drug Use No       Family History:  Family History   Problem Relation Age of Onset    Breast cancer Sister     Cancer Brother        Physical Exam:     Vitals:   Blood Pressure: (!) 172/77 (12/15/23 2200)  Pulse: (!) 54 (12/15/23 2200)  Temperature: 98.1 °F (36.7 °C) (12/15/23 1820)  Temp Source: Oral (12/15/23 1820)  Respirations: 18 (12/15/23 2200)  SpO2: 97 % (12/15/23 2200)    Physical Exam  Vitals and nursing note reviewed.   Constitutional:       General: She is not in acute distress.     Appearance: Normal appearance.   HENT:      Head: Normocephalic and atraumatic.      Mouth/Throat:      Mouth: Mucous membranes are moist.   Eyes:      General: No scleral icterus.     Extraocular Movements: Extraocular movements  intact.      Pupils: Pupils are equal, round, and reactive to light.   Cardiovascular:      Rate and Rhythm: Normal rate and regular rhythm.      Heart sounds: Normal heart sounds. No murmur heard.     No friction rub.   Pulmonary:      Effort: Pulmonary effort is normal.      Breath sounds: Normal breath sounds. No wheezing or rhonchi.   Abdominal:      General: Bowel sounds are normal. There is no distension.      Palpations: Abdomen is soft.      Tenderness: There is no abdominal tenderness. There is no guarding.   Musculoskeletal:         General: No swelling.      Cervical back: Neck supple.      Right lower leg: No edema.      Left lower leg: No edema.   Skin:     General: Skin is warm and dry.      Capillary Refill: Capillary refill takes less than 2 seconds.      Coloration: Skin is not jaundiced or pale.   Neurological:      General: No focal deficit present.      Mental Status: She is alert and oriented to person, place, and time. Mental status is at baseline.          Additional Data:     Lab Results:  Results from last 7 days   Lab Units 12/15/23  1950   WBC Thousand/uL 6.03   HEMOGLOBIN g/dL 13.1   HEMATOCRIT % 40.1   PLATELETS Thousands/uL 286   NEUTROS PCT % 69   LYMPHS PCT % 20   MONOS PCT % 8   EOS PCT % 1     Results from last 7 days   Lab Units 12/15/23  1950   SODIUM mmol/L 139   POTASSIUM mmol/L 3.2*   CHLORIDE mmol/L 104   CO2 mmol/L 29   BUN mg/dL 11   CREATININE mg/dL 0.69   ANION GAP mmol/L 6   CALCIUM mg/dL 9.4   ALBUMIN g/dL 3.5   TOTAL BILIRUBIN mg/dL 0.34   ALK PHOS U/L 60   ALT U/L 6*   AST U/L 15   GLUCOSE RANDOM mg/dL 91                       Imaging: Reviewed radiology reports from this admission including: abdominal/pelvic CT  CT abdomen pelvis wo contrast   Final Result by Troy Quezada MD (12/15 2123)      Advanced osteoarthritic degenerative changes involving the left hip with acetabulum protrusio noted.      Mild bladder wall thickening. Correlate for possible cystitis.       Gallstones without surrounding inflammation.                        Workstation performed: [id number]             EKG and Other Studies Reviewed on Admission:   Prior pertinent studies and records reviewed in [facility]/Care Everywhere.    ** Please Note: This note has been constructed using a voice recognition system. **

## 2023-12-16 NOTE — ASSESSMENT & PLAN NOTE
Upon arrival to the floor, patient was found A-fib with RVR, status post 1 dose of IV Lopressor  Patient also having UTI  Continue beta-blocker if patient blood pressure tolerates-otherwise consider loading dose of digoxin PO (to 50 mcg every 6 hours for 4 dose)-Per cardiology recommendation  Maintain electrolytes  Continue Eliquis.

## 2023-12-16 NOTE — ASSESSMENT & PLAN NOTE
Blood pressure 172/77 upon admission  Reports she is not taking her lisinopril any longer, unclear why  Resume lisinopril 20 mg daily  IV hydralazine ordered.  Monitor blood pressure trend

## 2023-12-16 NOTE — QUICK NOTE
Shortly after arrival to the floor patient became tachycardic, asymptomatic.  Initial EKG with A-fib, rate 135. New onset A-fib. I gave her 1 dose of Lopressor 12.5 mg orally with improvement of her heart rate into the 80s.  She is still in A-fib. Continue oral Lopressor 12.5 twice daily.  Will give 1 dose of therapeutic Lovenox for anticoagulation.  Obtain TTE and monitor on telemetry.  Cardiology consultation.    Carlos Montesinos PA-C

## 2023-12-16 NOTE — PROGRESS NOTES
[facility]  Progress Note  Name: Amanda Gonzalez I  MRN: [id number]  Unit/Bed#: -01 I Date of Admission: 12/15/2023   Date of Service: 12/16/2023 I Hospital Day: 0    Assessment/Plan   * Atrial fibrillation with RVR (HCC)  Assessment & Plan  Upon arrival to the floor, patient was found A-fib with RVR, status post 1 dose of IV Lopressor  Patient also having UTI  Continue beta-blocker if patient blood pressure tolerates-otherwise consider loading dose of digoxin PO (to 50 mcg every 6 hours for 4 dose)-Per cardiology recommendation  Maintain electrolytes  Continue Eliquis.    Hypokalemia  Assessment & Plan  Potassium 3.2 on admit  Replete and monitor    Ambulatory dysfunction  Assessment & Plan  Secondary to left hip pain, alcohol use, UTI  Lives home alone  PT/OT evaluations  Supportive care and safe ambulation with fall precautions    Acute cystitis without hematuria  Assessment & Plan  Straight cath UA suspicious for UTI  Fortunately not septic  CT with imaging suspicious for cystitis  Started on IV ceftriaxone  Follow-up on urine culture    Hypertensive urgency  Assessment & Plan  Initially presented with hypertensive urgency which improved but now    Primary osteoarthritis of left hip  Assessment & Plan  Longstanding history of left hip arthritis with pain and ambulatory dysfunction as a result  Scheduled Tylenol and lidocaine patch ordered.  Can consider low-dose gabapentin as well  PT OT eval's pending             VTE Pharmacologic Prophylaxis: VTE Score: 5 High Risk (Score >/= 5) - Pharmacological DVT Prophylaxis Ordered: apixaban (Eliquis). Sequential Compression Devices Ordered.    Mobility:   Basic Mobility Inpatient Raw Score: 21  JH-HLM Goal: 6: Walk 10 steps or more  JH-HLM Achieved: 6: Walk 10 steps or more  HLM Goal achieved. Continue to encourage appropriate mobility.    Patient Centered Rounds: I performed bedside rounds with nursing staff today.   Discussions  with Specialists or Other Care Team Provider: Cardiology    Education and Discussions with Family / Patient:  Left voice message for daughter.       Current Length of Stay: 0 day(s)  Current Patient Status: Inpatient   Certification Statement: The patient will continue to require additional inpatient hospital stay due to to monitor above conditions  Discharge Plan: Anticipate discharge in 48-72 hrs to rehab facility.    Code Status: Level 3 - DNAR and DNI    Subjective:   Seen and evaluated and examined.  Resting comfortably.  Denies any chest pain, short of breath, nausea, vomiting.      Objective:     Vitals:   Temp (24hrs), Av.8 °F (36.6 °C), Min:97.2 °F (36.2 °C), Max:98.1 °F (36.7 °C)    Temp:  [97.2 °F (36.2 °C)-98.1 °F (36.7 °C)] 98 °F (36.7 °C)  HR:  [] 127  Resp:  [15-22] 15  BP: ()/() 123/93  SpO2:  [90 %-98 %] 97 %  Body mass index is 20.18 kg/m².     Input and Output Summary (last 24 hours):     Intake/Output Summary (Last 24 hours) at 2023 1423  Last data filed at 12/15/2023 2239  Gross per 24 hour   Intake --   Output 100 ml   Net -100 ml       Physical Exam:   Physical Exam  Vitals and nursing note reviewed.   Constitutional:       Appearance: Normal appearance.   HENT:      Mouth/Throat:      Mouth: Mucous membranes are moist.   Eyes:      General: No scleral icterus.        Left eye: No discharge.      Extraocular Movements: Extraocular movements intact.      Conjunctiva/sclera: Conjunctivae normal.      Pupils: Pupils are equal, round, and reactive to light.   Cardiovascular:      Rate and Rhythm: Tachycardia present.      Heart sounds:      No friction rub. No gallop.   Pulmonary:      Effort: Pulmonary effort is normal. No respiratory distress.      Breath sounds: No stridor. No wheezing or rhonchi.   Musculoskeletal:      Right lower leg: No edema.      Left lower leg: No edema.   Neurological:      Mental Status: She is alert and oriented to person, place, and time.  Mental status is at baseline.         Additional Data:     Labs:  Results from last 7 days   Lab Units 12/16/23  0401   WBC Thousand/uL 3.21*   HEMOGLOBIN g/dL 12.1   HEMATOCRIT % 36.4   PLATELETS Thousands/uL 269   NEUTROS PCT % 78*   LYMPHS PCT % 19   MONOS PCT % 2*   EOS PCT % 0     Results from last 7 days   Lab Units 12/16/23  0401 12/15/23  1950   SODIUM mmol/L 139 139   POTASSIUM mmol/L 3.2* 3.2*   CHLORIDE mmol/L 107 104   CO2 mmol/L 26 29   BUN mg/dL 14 11   CREATININE mg/dL 0.63 0.69   ANION GAP mmol/L 6 6   CALCIUM mg/dL 8.7 9.4   ALBUMIN g/dL  --  3.5   TOTAL BILIRUBIN mg/dL  --  0.34   ALK PHOS U/L  --  60   ALT U/L  --  6*   AST U/L  --  15   GLUCOSE RANDOM mg/dL 142* 91                       Lines/Drains:  Invasive Devices       Peripheral Intravenous Line  Duration             Peripheral IV 12/16/23 Left;Ventral (anterior) Forearm <1 day                      Telemetry:  Telemetry Orders (From admission, onward)               24 Hour Telemetry Monitoring  Continuous x 24 Hours (Telem)        Question:  Reason for 24 Hour Telemetry  Answer:  Arrhythmias requiring acute medical intervention / PPM or ICD malfunction                     Telemetry Reviewed: Atrial fibrillation. HR averaging RVR  Indication for Continued Telemetry Use: Arrthymias requiring medical therapy             Imaging: No pertinent imaging reviewed.    Recent Cultures (last 7 days):         Last 24 Hours Medication List:   Current Facility-Administered Medications   Medication Dose Route Frequency Provider Last Rate    acetaminophen  975 mg Oral Q8H [facility] Carlos Montesinos PA-C      apixaban  5 mg Oral BID Linda Bill MD      cefTRIAXone  1,000 mg Intravenous Q24H Carlos Montesinos PA-C 1,000 mg (12/15/23 2778)    folic acid  1 mg Oral Daily Carlos Montesinos PA-C      hydrALAZINE  5 mg Intravenous Q6H PRN Carlos Montesinos PA-C      lidocaine  1 patch Topical Daily Carlos Montesinos PA-C      metoprolol  2.5 mg Intravenous Q6H PRN  Linda Bill MD      metoprolol tartrate  12.5 mg Oral Q12H [facility]ITENDRA Carlos Montesinos PA-C      multivitamin-minerals  1 tablet Oral Daily Carlos Montesinos PA-C      ondansetron  4 mg Intravenous Q6H PRN Carlos Montesinos PA-C      thiamine  100 mg Oral Daily Carlos Montesinos PA-C          Today, Patient Was Seen By: Linda Bill MD    **Please Note: This note may have been constructed using a voice recognition system.**

## 2023-12-16 NOTE — ASSESSMENT & PLAN NOTE
Longstanding history of left hip arthritis with pain and ambulatory dysfunction as a result  Scheduled Tylenol and lidocaine patch ordered.  Can consider low-dose gabapentin as well  PT OT marta's pending

## 2023-12-16 NOTE — ASSESSMENT & PLAN NOTE
Secondary to left hip pain, alcohol use, UTI  Lives home alone  PT/OT evaluations  Supportive care and safe ambulation with fall precautions

## 2023-12-16 NOTE — ASSESSMENT & PLAN NOTE
Straight cath UA suspicious for UTI  Fortunately not septic  CT with imaging suspicious for cystitis  Started on IV ceftriaxone  Follow-up on urine culture   Orthopedic

## 2023-12-16 NOTE — PLAN OF CARE
Problem: Potential for Falls  Goal: Patient will remain free of falls  Description: INTERVENTIONS:  - Educate patient/family on patient safety including physical limitations  - Instruct patient to call for assistance with activity   - Consult OT/PT to assist with strengthening/mobility   - Keep Call bell within reach  - Keep bed low and locked with side rails adjusted as appropriate  - Keep care items and personal belongings within reach  - Initiate and maintain comfort rounds  - Make Fall Risk Sign visible to staff  - Offer Toileting every 2 Hours, in advance of need  - Initiate/Maintain bed alarm  - Obtain necessary fall risk management equipment: alarm, socks  - Apply yellow socks and bracelet for high fall risk patients  - Consider moving patient to room near nurses station  Outcome: Progressing     Problem: PAIN - ADULT  Goal: Verbalizes/displays adequate comfort level or baseline comfort level  Description: Interventions:  - Encourage patient to monitor pain and request assistance  - Assess pain using appropriate pain scale  - Administer analgesics based on type and severity of pain and evaluate response  - Implement non-pharmacological measures as appropriate and evaluate response  - Consider cultural and social influences on pain and pain management  - Notify physician/advanced practitioner if interventions unsuccessful or patient reports new pain  Outcome: Progressing     Problem: INFECTION - ADULT  Goal: Absence or prevention of progression during hospitalization  Description: INTERVENTIONS:  - Assess and monitor for signs and symptoms of infection  - Monitor lab/diagnostic results  - Monitor all insertion sites, i.e. indwelling lines, tubes, and drains  - Monitor endotracheal if appropriate and nasal secretions for changes in amount and color  - [place] appropriate cooling/warming therapies per order  - Administer medications as ordered  - Instruct and encourage patient and family to use good hand  hygiene technique  - Identify and instruct in appropriate isolation precautions for identified infection/condition  Outcome: Progressing  Goal: Absence of fever/infection during neutropenic period  Description: INTERVENTIONS:  - Monitor WBC    Outcome: Progressing     Problem: SAFETY ADULT  Goal: Patient will remain free of falls  Description: INTERVENTIONS:  - Educate patient/family on patient safety including physical limitations  - Instruct patient to call for assistance with activity   - Consult OT/PT to assist with strengthening/mobility   - Keep Call bell within reach  - Keep bed low and locked with side rails adjusted as appropriate  - Keep care items and personal belongings within reach  - Initiate and maintain comfort rounds  - Make Fall Risk Sign visible to staff  - Offer Toileting every 2 Hours, in advance of need  - Initiate/Maintain bed alarm  - Obtain necessary fall risk management equipment: alarm, socks  - Apply yellow socks and bracelet for high fall risk patients  - Consider moving patient to room near nurses station  Outcome: Progressing  Goal: Maintain or return to baseline ADL function  Description: INTERVENTIONS:  - Educate patient/family on patient safety including physical limitations  - Instruct patient to call for assistance with activity   - Consult OT/PT to assist with strengthening/mobility   - Keep Call bell within reach  - Keep bed low and locked with side rails adjusted as appropriate  - Keep care items and personal belongings within reach  - Initiate and maintain comfort rounds  - Make Fall Risk Sign visible to staff  - Offer Toileting every 2 Hours, in advance of need  - Initiate/Maintain bed alarm  - Obtain necessary fall risk management equipment: alarm, socks  - Apply yellow socks and bracelet for high fall risk patients  - Consider moving patient to room near nurses station  Outcome: Progressing  Goal: Maintains/Returns to pre admission functional level  Description:  INTERVENTIONS:  - Perform AM-PAC 6 Click Basic Mobility/ Daily Activity assessment daily.  - Set and communicate daily mobility goal to care team and patient/family/caregiver.   - Collaborate with rehabilitation services on mobility goals if consulted  - Perform Range of Motion 3 times a day.  - Reposition patient every 2 hours.  - Dangle patient 3 times a day  - Stand patient 3 times a day  - Ambulate patient 3 times a day  - Out of bed to chair 3 times a day   - Out of bed for meals 3 times a day  - Out of bed for toileting  - Record patient progress and toleration of activity level   Outcome: Progressing     Problem: DISCHARGE PLANNING  Goal: Discharge to home or other facility with appropriate resources  Description: INTERVENTIONS:  - Identify barriers to discharge w/patient and caregiver  - Arrange for needed discharge resources and transportation as appropriate  - Identify discharge learning needs (meds, wound care, etc.)  - Arrange for interpretive services to assist at discharge as needed  - Refer to Case Management Department for coordinating discharge planning if the patient needs post-hospital services based on physician/advanced practitioner order or complex needs related to functional status, cognitive ability, or social support system  Outcome: Progressing     Problem: Knowledge Deficit  Goal: Patient/family/caregiver demonstrates understanding of disease process, treatment plan, medications, and discharge instructions  Description: Complete learning assessment and assess knowledge base.  Interventions:  - Provide teaching at level of understanding  - Provide teaching via preferred learning methods  Outcome: Progressing

## 2023-12-16 NOTE — ASSESSMENT & PLAN NOTE
Straight cath UA suspicious for UTI  Fortunately not septic  CT with imaging suspicious for cystitis  Started on IV ceftriaxone  Follow-up on urine culture

## 2023-12-17 LAB
ALBUMIN SERPL BCP-MCNC: 2.7 G/DL (ref 3.5–5)
ALP SERPL-CCNC: 43 U/L (ref 34–104)
ALT SERPL W P-5'-P-CCNC: 4 U/L (ref 7–52)
ANION GAP SERPL CALCULATED.3IONS-SCNC: 6 MMOL/L
AST SERPL W P-5'-P-CCNC: 12 U/L (ref 13–39)
BASOPHILS # BLD AUTO: 0.04 THOUSANDS/ÂΜL (ref 0–0.1)
BASOPHILS NFR BLD AUTO: 1 % (ref 0–1)
BILIRUB SERPL-MCNC: 0.34 MG/DL (ref 0.2–1)
BUN SERPL-MCNC: 21 MG/DL (ref 5–25)
CALCIUM ALBUM COR SERPL-MCNC: 9.3 MG/DL (ref 8.3–10.1)
CALCIUM SERPL-MCNC: 8.3 MG/DL (ref 8.4–10.2)
CHLORIDE SERPL-SCNC: 108 MMOL/L (ref 96–108)
CO2 SERPL-SCNC: 25 MMOL/L (ref 21–32)
CREAT SERPL-MCNC: 0.78 MG/DL (ref 0.6–1.3)
EOSINOPHIL # BLD AUTO: 0.05 THOUSAND/ÂΜL (ref 0–0.61)
EOSINOPHIL NFR BLD AUTO: 1 % (ref 0–6)
ERYTHROCYTE [DISTWIDTH] IN BLOOD BY AUTOMATED COUNT: 13.8 % (ref 11.6–15.1)
GFR SERPL CREATININE-BSD FRML MDRD: 68 ML/MIN/1.73SQ M
GLUCOSE SERPL-MCNC: 86 MG/DL (ref 65–140)
HCT VFR BLD AUTO: 34.3 % (ref 34.8–46.1)
HGB BLD-MCNC: 11.2 G/DL (ref 11.5–15.4)
IMM GRANULOCYTES # BLD AUTO: 0.02 THOUSAND/UL (ref 0–0.2)
IMM GRANULOCYTES NFR BLD AUTO: 1 % (ref 0–2)
LYMPHOCYTES # BLD AUTO: 1.65 THOUSANDS/ÂΜL (ref 0.6–4.47)
LYMPHOCYTES NFR BLD AUTO: 38 % (ref 14–44)
MAGNESIUM SERPL-MCNC: 2 MG/DL (ref 1.9–2.7)
MCH RBC QN AUTO: 28.4 PG (ref 26.8–34.3)
MCHC RBC AUTO-ENTMCNC: 32.7 G/DL (ref 31.4–37.4)
MCV RBC AUTO: 87 FL (ref 82–98)
MONOCYTES # BLD AUTO: 0.49 THOUSAND/ÂΜL (ref 0.17–1.22)
MONOCYTES NFR BLD AUTO: 11 % (ref 4–12)
NEUTROPHILS # BLD AUTO: 2.15 THOUSANDS/ÂΜL (ref 1.85–7.62)
NEUTS SEG NFR BLD AUTO: 48 % (ref 43–75)
NRBC BLD AUTO-RTO: 0 /100 WBCS
PLATELET # BLD AUTO: 237 THOUSANDS/UL (ref 149–390)
PMV BLD AUTO: 10 FL (ref 8.9–12.7)
POTASSIUM SERPL-SCNC: 3.2 MMOL/L (ref 3.5–5.3)
PROT SERPL-MCNC: 4.8 G/DL (ref 6.4–8.4)
RBC # BLD AUTO: 3.94 MILLION/UL (ref 3.81–5.12)
SODIUM SERPL-SCNC: 139 MMOL/L (ref 135–147)
WBC # BLD AUTO: 4.4 THOUSAND/UL (ref 4.31–10.16)

## 2023-12-17 PROCEDURE — 83735 ASSAY OF MAGNESIUM: CPT | Performed by: FAMILY MEDICINE

## 2023-12-17 PROCEDURE — 97163 PT EVAL HIGH COMPLEX 45 MIN: CPT

## 2023-12-17 PROCEDURE — 99232 SBSQ HOSP IP/OBS MODERATE 35: CPT | Performed by: INTERNAL MEDICINE

## 2023-12-17 PROCEDURE — 80053 COMPREHEN METABOLIC PANEL: CPT | Performed by: FAMILY MEDICINE

## 2023-12-17 PROCEDURE — 85025 COMPLETE CBC W/AUTO DIFF WBC: CPT | Performed by: FAMILY MEDICINE

## 2023-12-17 RX ORDER — POTASSIUM CHLORIDE 20 MEQ/1
40 TABLET, EXTENDED RELEASE ORAL 2 TIMES DAILY
Status: COMPLETED | OUTPATIENT
Start: 2023-12-17 | End: 2023-12-18

## 2023-12-17 RX ADMIN — APIXABAN 2.5 MG: 2.5 TABLET, FILM COATED ORAL at 09:01

## 2023-12-17 RX ADMIN — APIXABAN 2.5 MG: 2.5 TABLET, FILM COATED ORAL at 17:13

## 2023-12-17 RX ADMIN — Medication 12.5 MG: at 09:01

## 2023-12-17 RX ADMIN — Medication 12.5 MG: at 20:47

## 2023-12-17 RX ADMIN — CEFTRIAXONE 1000 MG: 1 INJECTION, SOLUTION INTRAVENOUS at 23:54

## 2023-12-17 RX ADMIN — POTASSIUM CHLORIDE 40 MEQ: 1500 TABLET, EXTENDED RELEASE ORAL at 18:27

## 2023-12-17 RX ADMIN — FOLIC ACID 1 MG: 1 TABLET ORAL at 09:01

## 2023-12-17 RX ADMIN — MULTIPLE VITAMINS W/ MINERALS TAB 1 TABLET: TAB ORAL at 09:01

## 2023-12-17 RX ADMIN — Medication 100 MG: at 09:01

## 2023-12-17 NOTE — PROGRESS NOTES
Formerly [facility], [facility]  Progress Note  Name: Amanda Gonzalez I  MRN: [id number]  Unit/Bed#: -01 I Date of Admission: 12/15/2023   Date of Service: 2023 I Hospital Day: 1    Assessment/Plan   Acute cystitis without hematuria  Assessment & Plan  Straight cath UA suspicious for UTI  Fortunately not septic  CT with imaging suspicious for cystitis  Started on IV ceftriaxone  Follow-up on urine culture    * Atrial fibrillation with RVR (HCC)  Assessment & Plan  Upon arrival to the floor, patient was found A-fib with RVR, status post 1 dose of IV Lopressor  Patient also having UTI  Continue beta-blocker. Card following  Maintain electrolytes  Continue Eliquis.  Pending Echo     Hypokalemia  Assessment & Plan  Potassium 3.2 on admit  Still low, replete and monitor    Ambulatory dysfunction  Assessment & Plan  Secondary to left hip pain, alcohol use, UTI  Lives home alone  PT/OT evaluations  Supportive care and safe ambulation with fall precautions    Primary osteoarthritis of left hip  Assessment & Plan  Longstanding history of left hip arthritis with pain and ambulatory dysfunction as a result  Scheduled Tylenol and lidocaine patch ordered.  Can consider low-dose gabapentin as well  PT OT eval's pending           VTE Pharmacologic Prophylaxis:   Pharmacologic: Apixaban (Eliquis)  Mechanical VTE Prophylaxis in Place: Yes    Discussions with Specialists or Other Care Team Provider:     Education and Discussions with Family / Patient:     Current Length of Stay: 1 day(s)    Current Patient Status: Inpatient   Certification Statement: The patient will continue to require additional inpatient hospital stay due to Waiting for UC    Discharge Plan: PT/OT pending    Code Status: Level 3 - DNAR and DNI      Subjective:   No overnight events    Objective:     Vitals:   Temp (24hrs), Av.5 °F (36.4 °C), Min:97 °F (36.1 °C), Max:98.2 °F (36.8 °C)    Temp:  [97 °F (36.1 °C)-98.2 °F (36.8 °C)] 97 °F  (36.1 °C)  HR:  [77-98] 94  Resp:  [15-16] 15  BP: ()/(65-84) 127/84  SpO2:  [94 %-97 %] 95 %  Body mass index is 20.18 kg/m².     Input and Output Summary (last 24 hours):       Intake/Output Summary (Last 24 hours) at 12/17/2023 1714  Last data filed at 12/17/2023 1241  Gross per 24 hour   Intake 180 ml   Output --   Net 180 ml       Physical Exam:     Physical Exam  Constitutional:       General: She is not in acute distress.     Appearance: She is not ill-appearing, toxic-appearing or diaphoretic.   Eyes:      General:         Right eye: No discharge.         Left eye: No discharge.   Cardiovascular:      Rate and Rhythm: Rhythm irregular.   Pulmonary:      Effort: Pulmonary effort is normal. No respiratory distress.   Musculoskeletal:         General: No swelling.   Neurological:      Mental Status: Mental status is at baseline.         Additional Data:     Labs:    Results from last 7 days   Lab Units 12/17/23  0405   WBC Thousand/uL 4.40   HEMOGLOBIN g/dL 11.2*   HEMATOCRIT % 34.3*   PLATELETS Thousands/uL 237   NEUTROS PCT % 48   LYMPHS PCT % 38   MONOS PCT % 11   EOS PCT % 1     Results from last 7 days   Lab Units 12/17/23  0405   POTASSIUM mmol/L 3.2*   CHLORIDE mmol/L 108   CO2 mmol/L 25   BUN mg/dL 21   CREATININE mg/dL 0.78   CALCIUM mg/dL 8.3*   ALK PHOS U/L 43   ALT U/L 4*   AST U/L 12*           *  Recent Cultures (last 7 days):     Results from last 7 days   Lab Units 12/15/23  2240   URINE CULTURE  >100,000 cfu/ml Citrobacter freundii*  >100,000 cfu/ml Aerococcus urinae*       Last 24 Hours Medication List:   Current Facility-Administered Medications   Medication Dose Route Frequency Provider Last Rate    acetaminophen  975 mg Oral Q8H [facility] Carlos Montesinos PA-C      apixaban  2.5 mg Oral BID Linda Bill MD      cefTRIAXone  1,000 mg Intravenous Q24H Carlos Montesinos PA-C 1,000 mg (12/16/23 2233)    folic acid  1 mg Oral Daily Carlos Montesinos PA-C      hydrALAZINE  5 mg Intravenous  Q6H PRN Carlos Montesinos PA-C      lidocaine  1 patch Topical Daily Carlos Montesinos PA-C      metoprolol  2.5 mg Intravenous Q6H PRN Linda Bill MD      metoprolol tartrate  12.5 mg Oral Q12H JITENDRA Carlos Montesinos PA-C      multivitamin-minerals  1 tablet Oral Daily Carlos Montesinos PA-C      ondansetron  4 mg Intravenous Q6H PRN Carlos Montesinos PA-C      potassium chloride  40 mEq Oral BID Huma Keys MD      thiamine  100 mg Oral Daily Carlos Montesinos PA-C          Today, Patient Was Seen By: Huma Kyes MD    ** Please Note: Dictation voice to text software may have been used in the creation of this document. **

## 2023-12-17 NOTE — PLAN OF CARE
Problem: PHYSICAL THERAPY ADULT  Goal: Performs mobility at highest level of function for planned discharge setting.  See evaluation for individualized goals.  Description: Treatment/Interventions: Functional transfer training, LE strengthening/ROM, Elevations, Therapeutic exercise, Endurance training, Patient/family training, Equipment eval/education, Cognitive reorientation, Bed mobility, Gait training  Equipment Recommended: Walker       See flowsheet documentation for full assessment, interventions and recommendations.  Note:    Problem List: Decreased strength, Decreased endurance, Impaired balance, Decreased mobility, Decreased cognition, Impaired judgement, Decreased safety awareness  Assessment: Pt is a 88 y.o. female seen for PT evaluation s/p admit to  [facility]  on 8/18/2022 w/ Atrial fibrillation with RVR (Formerly [facility]).  Order placed for PT. Comorbidities affecting pt's physical performance at time of assessment include: HTN and osteoarthritis and chronic pain syndrome . Personal factors affecting pt at time of IE include: limited home support, advanced age, and limited insight into impairments. Prior to admission, pt was was independent w/ all functional mobility w/ recent use of RW, lived in one floor environment, and lived alone . Upon evaluation: Pt requires min A for sit to stand and min A for short distance ambulation with RW.   (Please find full objective findings from PT assessment regarding body systems outlined above). Impairments and limitations also listed above, especially due to  weakness, impaired balance, decreased endurance, gait deviations, pain, decreased activity tolerance, decreased safety awareness, impaired judgement, and fall risk.  Pt's clinical presentation is currently unstable/unpredictable seen in pt's presentation of fall risk, poor insight into deficits, and significant decline in functional mobility compared to baseline.  Pt to benefit from continued skilled PT tx  while in hospital and upon DC to address deficits as defined above and maximize level of functional mobility.   Recommend  progression of transfers and ambulation as appropriate .        Rehab Resource Intensity Level, PT: II (Moderate Resource Intensity)    See flowsheet documentation for full assessment.

## 2023-12-17 NOTE — PHYSICAL THERAPY NOTE
"   PHYSICAL THERAPY EVALUATION  NAME: Amanda Gonzalez  AGE:   88 y.o.  MRN:  [id number]  ADMIT DX: Weakness [R53.1]  Left hip pain [M25.552]  EtOH dependence (HCC) [F10.20]    PMH:   Past Medical History:   Diagnosis Date    Allergic rhinitis     Arthritis     Back pain     Colon polyps     GERD (gastroesophageal reflux disease)     Hyperlipidemia     Hypertension      LENGTH OF STAY: 1        12/17/23 1250   PT Last Visit   PT Visit Date 12/17/23   Note Type   Note type Evaluation   Pain Assessment   Pain Assessment Tool 0-10   Pain Score No Pain   Hospital Pain Intervention(s) Repositioned;Ambulation/increased activity   Restrictions/Precautions   Weight Bearing Precautions Per Order No   Other Precautions Cognitive;Impulsive;Chair Alarm;Bed Alarm;Fall Risk   Home Living   Type of Home House   Home Layout One level;Stairs to enter with rails  (1 ADRIENNE)   Home Equipment Walker   Additional Comments Ambulates with mod I and RW for the past few weeks due to increased pain/instability of hip.   Prior Function   Level of [place] Independent with functional mobility   Lives With (S)  Alone   Receives Help From (S)  Family  (? pt reports her daughter has her food stamps, however when asked if daughter provides groceries, unable to answer question.  Pt reports there was a 3 or 4 day period when her daughter was sick that she survived off of tootsie rolls and whiskey)   IADLs (S)    (pt reports she does not drive and that she has not gone to the doctor in 10 years, unable to provide a mode of transportation)   Falls in the last 6 months 0   Comments (S)  Pt also reports that she has not bathed/washed up or done laundry in \"years\".  \"The human body can take a lot, my skin is fine.\"   General   Family/Caregiver Present No   Cognition   Overall Cognitive Status Impaired  (highly distracted)   Arousal/Participation Cooperative   Orientation Level Oriented to person;Oriented to place  (initially answers June 2027 for " "time, however able to answer correctly for 2nd attempt)   Memory Decreased short term memory   Following Commands Follows one step commands with increased time or repetition   Comments Pt identified by name and .   Subjective   Subjective Agrees to PT evaluation and is cooperative throughout session. \"They all try to steal my stuff, that's why I have to hide it.\"  (Pt found with tissue box in lap under gown and food, hospital, pen, and socks stuffed down her gown.)   RLE Assessment   RLE Assessment X   Strength RLE   RLE Overall Strength 4-/5  (functionally)   LLE Assessment   LLE Assessment X   Strength LLE   LLE Overall Strength 3-/5  (functionally)   Bed Mobility   Supine to Sit Unable to assess  (OOB in chair pre/post session with alarm intact)   Transfers   Sit to Stand 4  Minimal assistance   Additional items Assist x 1;Increased time required;Verbal cues   Stand to Sit 4  Minimal assistance   Additional items Assist x 1;Increased time required;Verbal cues   Ambulation/Elevation   Gait pattern Improper Weight shift;Forward Flexion;Decreased foot clearance;Short stride;Excessively slow;Step to;Decreased L stance   Gait Assistance 4  Minimal assist   Additional items Assist x 1;Verbal cues   Assistive Device Rolling walker   Distance 2` forward/backward x1  (self-limited)   Balance   Static Sitting Fair +   Dynamic Sitting Fair   Static Standing Fair -   Dynamic Standing Poor +   Ambulatory Poor +   Endurance Deficit   Endurance Deficit Yes   Endurance Deficit Description limited ambulation distance, fatigue   Activity Tolerance   Activity Tolerance Patient limited by fatigue   Nurse Made Aware Per RN, pt appropriate to evaluate   Assessment   Problem List Decreased strength;Decreased endurance;Impaired balance;Decreased mobility;Decreased cognition;Impaired judgement;Decreased safety awareness   Assessment Pt is a 88 y.o. female seen for PT evaluation s/p admit to  [facility]  on 2022 w/ " Atrial fibrillation with RVR (HCC).  Order placed for PT. Comorbidities affecting pt's physical performance at time of assessment include: HTN and osteoarthritis and chronic pain syndrome . Personal factors affecting pt at time of IE include: limited home support, advanced age, and limited insight into impairments. Prior to admission, pt was was independent w/ all functional mobility w/ recent use of RW, lived in one floor environment, and lived alone . Upon evaluation: Pt requires min A for sit to stand and min A for short distance ambulation with RW.   (Please find full objective findings from PT assessment regarding body systems outlined above). Impairments and limitations also listed above, especially due to  weakness, impaired balance, decreased endurance, gait deviations, pain, decreased activity tolerance, decreased safety awareness, impaired judgement, and fall risk.  Pt's clinical presentation is currently unstable/unpredictable seen in pt's presentation of fall risk, poor insight into deficits, and significant decline in functional mobility compared to baseline.  Pt to benefit from continued skilled PT tx while in hospital and upon DC to address deficits as defined above and maximize level of functional mobility.   Recommend  progression of transfers and ambulation as appropriate .   Goals   Patient Goals to get out of here   STG Expiration Date 12/27/23   Short Term Goal #1 Pt will be able to: (1) perform bed mobility with supervision to promote OOB activity (2) perform sit to stand with supervision to decrease burden of care (3) ambulate at least 200` with supervision and least restrictive AD to increase activity tolerance (4) increase standing balance by 1 grade to decrease risk of falls (5) negotiate at least 1 stair with supervision and use of RW to allow safe access into home   PT Treatment Day 0   Plan   Treatment/Interventions Functional transfer training;LE strengthening/ROM;Elevations;Therapeutic  exercise;Endurance training;Patient/family training;Equipment eval/education;Cognitive reorientation;Bed mobility;Gait training   PT Frequency 3-5x/wk   Discharge Recommendation   Rehab Resource Intensity Level, PT II (Moderate Resource Intensity)   Equipment Recommended Walker   Walker Package Recommended Wheeled walker   [facility] Basic Mobility Inpatient   Turning in Flat Bed Without Bedrails 3   Lying on Back to Sitting on Edge of Flat Bed Without Bedrails 3   Moving Bed to Chair 3   Standing Up From Chair Using Arms 3   Walk in Room 2   Climb 3-5 Stairs With Railing 1   Basic Mobility Inpatient Raw Score 15   Basic Mobility Standardized Score 36.97   Highest Level Of Mobility   -[facility] Goal 4: Move to chair/commode   -HL Achieved 5: Stand (1 or more minutes)   End of Consult   Patient Position at End of Consult Bedside chair;Bed/Chair alarm activated;All needs within reach     The patient's [facility] Basic Mobility Inpatient Short Form Raw Score is 15, Standardized Score is 36.97.  A Raw Score of less than 16 suggests the patient may benefit from discharge to post-acute rehabilitation services. However please refer to therapist recommendation for discharge planning given other factors that may influence destination.     Adapted from Bryan SWAIN, Dandy J, Nanette J, Clover MIKE. [employer] [facility] “6-Clicks” Basic Mobility and Daily Activity Scores With Discharge Destination. Physical Therapy, 2021;101:1-9. DOI: [email or web address]      Sandra John PT,DPT

## 2023-12-17 NOTE — PLAN OF CARE
Problem: Potential for Falls  Goal: Patient will remain free of falls  Description: INTERVENTIONS:  - Educate patient/family on patient safety including physical limitations  - Instruct patient to call for assistance with activity   - Consult OT/PT to assist with strengthening/mobility   - Keep Call bell within reach  - Keep bed low and locked with side rails adjusted as appropriate  - Keep care items and personal belongings within reach  - Initiate and maintain comfort rounds  - Make Fall Risk Sign visible to staff  - Offer Toileting every 2 Hours, in advance of need  - Initiate/Maintain bed alarm  - Obtain necessary fall risk management equipment: alarm, socks  - Apply yellow socks and bracelet for high fall risk patients  - Consider moving patient to room near nurses station  Outcome: Progressing     Problem: PAIN - ADULT  Goal: Verbalizes/displays adequate comfort level or baseline comfort level  Description: Interventions:  - Encourage patient to monitor pain and request assistance  - Assess pain using appropriate pain scale  - Administer analgesics based on type and severity of pain and evaluate response  - Implement non-pharmacological measures as appropriate and evaluate response  - Consider cultural and social influences on pain and pain management  - Notify physician/advanced practitioner if interventions unsuccessful or patient reports new pain  Outcome: Progressing

## 2023-12-17 NOTE — PROGRESS NOTES
"Cardiology Progress Note - Amanda Gonzalez 88 y.o. female MRN: [id number]    Unit/Bed#: -01 Encounter: [id number]  Assessment and plan  #1 new onset atrial fibrillation  #2 hypertensive urgency    Recommendations: Heart rate controlled on low-dose metoprolol.  She has been started on anticoagulation.  Patient is very tangential in her conversations.  Echocardiogram has been ordered.  No need for any other medication changes today.      Subjective:    No significant events overnight.  Denies any chest pain or dyspnea denies any lightheadedness or dizziness.  Rate controlled A-fib on telemetry.    ROS    Objective:   Vitals: Blood pressure 114/65, pulse 86, temperature (!) 97.4 °F (36.3 °C), resp. rate 16, height 5' 5\" (1.651 m), weight 55 kg (121 lb 4.1 oz), SpO2 97%., Body mass index is 20.18 kg/m².,   Orthostatic Blood Pressures      Flowsheet Row Most Recent Value   Blood Pressure 114/65 filed at 2023 0729           Systolic (24hrs), Av , Min:91 , Max:123     Diastolic (24hrs), Av, Min:65, Max:93    No intake or output data in the 24 hours ending 23 1124  Weight (last 2 days)       Date/Time Weight    12/15/23 2340 55 (121.25)    12/15/23 2300 55.5 (122.36)              Telemetry Review: No significant arrhythmias seen on telemetry review.   EKG personally reviewed by Pierce Simon DO.     Physical Exam  Vitals and nursing note reviewed.   Constitutional:       General: She is not in acute distress.     Appearance: She is well-developed.   HENT:      Head: Normocephalic and atraumatic.   Eyes:      Conjunctiva/sclera: Conjunctivae normal.      Pupils: Pupils are equal, round, and reactive to light.   Cardiovascular:      Rate and Rhythm: Normal rate. Rhythm irregular.      Heart sounds: Normal heart sounds. No murmur heard.     No friction rub.   Pulmonary:      Effort: Pulmonary effort is normal. No respiratory distress.      Breath sounds: Normal breath sounds. No wheezing or " "rales.   Abdominal:      General: Bowel sounds are normal. There is no distension.      Palpations: Abdomen is soft.      Tenderness: There is no abdominal tenderness. There is no rebound.   Musculoskeletal:         General: No tenderness or deformity. Normal range of motion.      Cervical back: Neck supple.   Skin:     General: Skin is warm and dry.      Findings: No erythema.   Neurological:      Mental Status: She is alert and oriented to person, place, and time.      Cranial Nerves: No cranial nerve deficit.           Laboratory Results:        CBC with diff:   Results from last 7 days   Lab Units 12/17/23  0405 12/16/23  0401 12/15/23  1950   WBC Thousand/uL 4.40 3.21* 6.03   HEMOGLOBIN g/dL 11.2* 12.1 13.1   HEMATOCRIT % 34.3* 36.4 40.1   MCV fL 87 86 86   PLATELETS Thousands/uL 237 269 286   RBC Million/uL 3.94 4.24 4.65   MCH pg 28.4 28.5 28.2   MCHC g/dL 32.7 33.2 32.7   RDW % 13.8 13.7 13.8   MPV fL 10.0 10.2 10.0   NRBC AUTO /100 WBCs 0 0 0         CMP:  Results from last 7 days   Lab Units 12/17/23  0405 12/16/23  0401 12/15/23  1950   POTASSIUM mmol/L 3.2* 3.2* 3.2*   CHLORIDE mmol/L 108 107 104   CO2 mmol/L 25 26 29   BUN mg/dL 21 14 11   CREATININE mg/dL 0.78 0.63 0.69   CALCIUM mg/dL 8.3* 8.7 9.4   AST U/L 12*  --  15   ALT U/L 4*  --  6*   ALK PHOS U/L 43  --  60   EGFR ml/min/1.73sq m 68 80 78         BMP:  Results from last 7 days   Lab Units 12/17/23  0405 12/16/23  0401 12/15/23  1950   POTASSIUM mmol/L 3.2* 3.2* 3.2*   CHLORIDE mmol/L 108 107 104   CO2 mmol/L 25 26 29   BUN mg/dL 21 14 11   CREATININE mg/dL 0.78 0.63 0.69   CALCIUM mg/dL 8.3* 8.7 9.4       BNP: No results for input(s): \"BNP\" in the last 72 hours.    Magnesium:   Results from last 7 days   Lab Units 12/17/23  0405 12/16/23  0401   MAGNESIUM mg/dL 2.0 2.0       Coags:       TSH:        Hemoglobin A1C       Lipid Profile:       Cardiac testing:   No results found for this or any previous visit.    No results found for this or any " previous visit.    No results found for this or any previous visit.    No results found for this or any previous visit.      Meds/Allergies   all current active meds have been reviewed  Medications Prior to Admission   Medication    aspirin 325 mg tablet    Diapers & Supplies MISC    lisinopril (ZESTRIL) 20 mg tablet    Sanitary Napkins & Tampons (KOTEX MAXI OVERNITE) PADS          Assessment:  Principal Problem:    Atrial fibrillation with RVR ([facility])  Active Problems:    Primary osteoarthritis of left hip    Hypertensive urgency    Acute cystitis without hematuria    Ambulatory dysfunction    Hypokalemia            Counseling / Coordination of Care  Total floor / unit time spent today 25 minutes.  Greater than 50% of total time was spent with the patient and / or family counseling and / or coordination of care.  A description of the counseling / coordination of care: .

## 2023-12-17 NOTE — ASSESSMENT & PLAN NOTE
Upon arrival to the floor, patient was found A-fib with RVR, status post 1 dose of IV Lopressor  Patient also having UTI  Continue beta-blocker. Card following  Maintain electrolytes  Continue Eliquis.  Pending Echo

## 2023-12-17 NOTE — PLAN OF CARE
Problem: Potential for Falls  Goal: Patient will remain free of falls  Description: INTERVENTIONS:  - Educate patient/family on patient safety including physical limitations  - Instruct patient to call for assistance with activity   - Consult OT/PT to assist with strengthening/mobility   - Keep Call bell within reach  - Keep bed low and locked with side rails adjusted as appropriate  - Keep care items and personal belongings within reach  - Initiate and maintain comfort rounds  - Make Fall Risk Sign visible to staff  - Offer Toileting every 2 Hours, in advance of need  - Initiate/Maintain bed alarm  - Obtain necessary fall risk management equipment: alarm, socks  - Apply yellow socks and bracelet for high fall risk patients  - Consider moving patient to room near nurses station  Outcome: Progressing     Problem: PAIN - ADULT  Goal: Verbalizes/displays adequate comfort level or baseline comfort level  Description: Interventions:  - Encourage patient to monitor pain and request assistance  - Assess pain using appropriate pain scale  - Administer analgesics based on type and severity of pain and evaluate response  - Implement non-pharmacological measures as appropriate and evaluate response  - Consider cultural and social influences on pain and pain management  - Notify physician/advanced practitioner if interventions unsuccessful or patient reports new pain  Outcome: Progressing     Problem: INFECTION - ADULT  Goal: Absence or prevention of progression during hospitalization  Description: INTERVENTIONS:  - Assess and monitor for signs and symptoms of infection  - Monitor lab/diagnostic results  - Monitor all insertion sites, i.e. indwelling lines, tubes, and drains  - Monitor endotracheal if appropriate and nasal secretions for changes in amount and color  - [place] appropriate cooling/warming therapies per order  - Administer medications as ordered  - Instruct and encourage patient and family to use good hand  hygiene technique  - Identify and instruct in appropriate isolation precautions for identified infection/condition  Outcome: Progressing     Problem: SAFETY ADULT  Goal: Patient will remain free of falls  Description: INTERVENTIONS:  - Educate patient/family on patient safety including physical limitations  - Instruct patient to call for assistance with activity   - Consult OT/PT to assist with strengthening/mobility   - Keep Call bell within reach  - Keep bed low and locked with side rails adjusted as appropriate  - Keep care items and personal belongings within reach  - Initiate and maintain comfort rounds  - Make Fall Risk Sign visible to staff  - Offer Toileting every 2 Hours, in advance of need  - Initiate/Maintain bed alarm  - Obtain necessary fall risk management equipment: alarm, socks  - Apply yellow socks and bracelet for high fall risk patients  - Consider moving patient to room near nurses station  Outcome: Progressing  Goal: Maintain or return to baseline ADL function  Description: INTERVENTIONS:  - Educate patient/family on patient safety including physical limitations  - Instruct patient to call for assistance with activity   - Consult OT/PT to assist with strengthening/mobility   - Keep Call bell within reach  - Keep bed low and locked with side rails adjusted as appropriate  - Keep care items and personal belongings within reach  - Initiate and maintain comfort rounds  - Make Fall Risk Sign visible to staff  - Offer Toileting every 2 Hours, in advance of need  - Initiate/Maintain bed alarm  - Obtain necessary fall risk management equipment: alarm, socks  - Apply yellow socks and bracelet for high fall risk patients  - Consider moving patient to room near nurses station  Outcome: Progressing  Goal: Maintains/Returns to pre admission functional level  Description: INTERVENTIONS:  - Perform AM-PAC 6 Click Basic Mobility/ Daily Activity assessment daily.  - Set and communicate daily mobility goal to  care team and patient/family/caregiver.   - Collaborate with rehabilitation services on mobility goals if consulted  - Perform Range of Motion 3 times a day.  - Reposition patient every 2 hours.  - Dangle patient 3 times a day  - Stand patient 3 times a day  - Ambulate patient 3 times a day  - Out of bed to chair 3 times a day   - Out of bed for meals 3 times a day  - Out of bed for toileting  - Record patient progress and toleration of activity level   Outcome: Progressing     Problem: DISCHARGE PLANNING  Goal: Discharge to home or other facility with appropriate resources  Description: INTERVENTIONS:  - Identify barriers to discharge w/patient and caregiver  - Arrange for needed discharge resources and transportation as appropriate  - Identify discharge learning needs (meds, wound care, etc.)  - Arrange for interpretive services to assist at discharge as needed  - Refer to Case Management Department for coordinating discharge planning if the patient needs post-hospital services based on physician/advanced practitioner order or complex needs related to functional status, cognitive ability, or social support system  Outcome: Progressing     Problem: Knowledge Deficit  Goal: Patient/family/caregiver demonstrates understanding of disease process, treatment plan, medications, and discharge instructions  Description: Complete learning assessment and assess knowledge base.  Interventions:  - Provide teaching at level of understanding  - Provide teaching via preferred learning methods  Outcome: Progressing

## 2023-12-18 ENCOUNTER — APPOINTMENT (INPATIENT)
Dept: NON INVASIVE DIAGNOSTICS | Facility: HOSPITAL | Age: 88
DRG: 690 | End: 2023-12-18
Payer: MEDICARE

## 2023-12-18 PROBLEM — E87.6 HYPOKALEMIA: Status: RESOLVED | Noted: 2023-12-15 | Resolved: 2023-12-18

## 2023-12-18 LAB
ANION GAP SERPL CALCULATED.3IONS-SCNC: 4 MMOL/L
AORTIC ROOT: 3.3 CM
APICAL FOUR CHAMBER EJECTION FRACTION: 55 %
ASCENDING AORTA: 3.7 CM
BASOPHILS # BLD AUTO: 0.06 THOUSANDS/ÂΜL (ref 0–0.1)
BASOPHILS NFR BLD AUTO: 1 % (ref 0–1)
BUN SERPL-MCNC: 16 MG/DL (ref 5–25)
CALCIUM SERPL-MCNC: 8.4 MG/DL (ref 8.4–10.2)
CHLORIDE SERPL-SCNC: 109 MMOL/L (ref 96–108)
CO2 SERPL-SCNC: 25 MMOL/L (ref 21–32)
CREAT SERPL-MCNC: 0.66 MG/DL (ref 0.6–1.3)
E WAVE DECELERATION TIME: 138 MS
E/A RATIO: 2.02
EOSINOPHIL # BLD AUTO: 0.09 THOUSAND/ÂΜL (ref 0–0.61)
EOSINOPHIL NFR BLD AUTO: 2 % (ref 0–6)
ERYTHROCYTE [DISTWIDTH] IN BLOOD BY AUTOMATED COUNT: 13.8 % (ref 11.6–15.1)
FRACTIONAL SHORTENING: 30 (ref 28–44)
GFR SERPL CREATININE-BSD FRML MDRD: 79 ML/MIN/1.73SQ M
GLUCOSE SERPL-MCNC: 126 MG/DL (ref 65–140)
HCT VFR BLD AUTO: 36.3 % (ref 34.8–46.1)
HGB BLD-MCNC: 11.7 G/DL (ref 11.5–15.4)
IMM GRANULOCYTES # BLD AUTO: 0.01 THOUSAND/UL (ref 0–0.2)
IMM GRANULOCYTES NFR BLD AUTO: 0 % (ref 0–2)
INTERVENTRICULAR SEPTUM IN DIASTOLE (PARASTERNAL SHORT AXIS VIEW): 1.3 CM
INTERVENTRICULAR SEPTUM: 1.3 CM (ref 0.6–1.1)
LAAS-AP2: 17.5 CM2
LAAS-AP4: 17.3 CM2
LEFT ATRIUM SIZE: 4.1 CM
LEFT ATRIUM VOLUME (MOD BIPLANE): 53 ML
LEFT ATRIUM VOLUME INDEX (MOD BIPLANE): 33.1 ML/M2
LEFT INTERNAL DIMENSION IN SYSTOLE: 2.8 CM (ref 2.1–4)
LEFT VENTRICLE DIASTOLIC VOLUME (MOD BIPLANE): 97 ML
LEFT VENTRICLE SYSTOLIC VOLUME (MOD BIPLANE): 47 ML
LEFT VENTRICULAR INTERNAL DIMENSION IN DIASTOLE: 4 CM (ref 3.5–6)
LEFT VENTRICULAR POSTERIOR WALL IN END DIASTOLE: 1.3 CM
LEFT VENTRICULAR STROKE VOLUME: 40 ML
LV EF: 52 %
LVSV (TEICH): 40 ML
LYMPHOCYTES # BLD AUTO: 1.71 THOUSANDS/ÂΜL (ref 0.6–4.47)
LYMPHOCYTES NFR BLD AUTO: 35 % (ref 14–44)
MCH RBC QN AUTO: 28.8 PG (ref 26.8–34.3)
MCHC RBC AUTO-ENTMCNC: 32.2 G/DL (ref 31.4–37.4)
MCV RBC AUTO: 89 FL (ref 82–98)
MONOCYTES # BLD AUTO: 0.51 THOUSAND/ÂΜL (ref 0.17–1.22)
MONOCYTES NFR BLD AUTO: 10 % (ref 4–12)
MV E'TISSUE VEL-LAT: 9 CM/S
MV E'TISSUE VEL-SEP: 7 CM/S
MV PEAK A VEL: 0.41 M/S
MV PEAK E VEL: 83 CM/S
MV STENOSIS PRESSURE HALF TIME: 40 MS
MV VALVE AREA P 1/2 METHOD: 5.5
NEUTROPHILS # BLD AUTO: 2.58 THOUSANDS/ÂΜL (ref 1.85–7.62)
NEUTS SEG NFR BLD AUTO: 52 % (ref 43–75)
NRBC BLD AUTO-RTO: 0 /100 WBCS
PLATELET # BLD AUTO: 239 THOUSANDS/UL (ref 149–390)
PMV BLD AUTO: 9.9 FL (ref 8.9–12.7)
POTASSIUM SERPL-SCNC: 3.6 MMOL/L (ref 3.5–5.3)
RBC # BLD AUTO: 4.06 MILLION/UL (ref 3.81–5.12)
RIGHT ATRIUM AREA SYSTOLE A4C: 12.3 CM2
RIGHT VENTRICLE ID DIMENSION: 2.8 CM
SL CV LEFT ATRIUM LENGTH A2C: 4.5 CM
SL CV LV EF: 55
SL CV PED ECHO LEFT VENTRICLE DIASTOLIC VOLUME (MOD BIPLANE) 2D: 68 ML
SL CV PED ECHO LEFT VENTRICLE SYSTOLIC VOLUME (MOD BIPLANE) 2D: 29 ML
SODIUM SERPL-SCNC: 138 MMOL/L (ref 135–147)
TR MAX PG: 28 MMHG
TR PEAK VELOCITY: 2.6 M/S
TRICUSPID ANNULAR PLANE SYSTOLIC EXCURSION: 1.9 CM
TRICUSPID VALVE PEAK REGURGITATION VELOCITY: 2.64 M/S
WBC # BLD AUTO: 4.96 THOUSAND/UL (ref 4.31–10.16)

## 2023-12-18 PROCEDURE — 99232 SBSQ HOSP IP/OBS MODERATE 35: CPT | Performed by: INTERNAL MEDICINE

## 2023-12-18 PROCEDURE — 85025 COMPLETE CBC W/AUTO DIFF WBC: CPT | Performed by: INTERNAL MEDICINE

## 2023-12-18 PROCEDURE — 80048 BASIC METABOLIC PNL TOTAL CA: CPT | Performed by: INTERNAL MEDICINE

## 2023-12-18 PROCEDURE — 99233 SBSQ HOSP IP/OBS HIGH 50: CPT

## 2023-12-18 PROCEDURE — 93306 TTE W/DOPPLER COMPLETE: CPT | Performed by: INTERNAL MEDICINE

## 2023-12-18 PROCEDURE — 93306 TTE W/DOPPLER COMPLETE: CPT

## 2023-12-18 RX ADMIN — Medication 100 MG: at 08:16

## 2023-12-18 RX ADMIN — LIDOCAINE 1 PATCH: 700 PATCH TOPICAL at 08:18

## 2023-12-18 RX ADMIN — ACETAMINOPHEN 975 MG: 325 TABLET, FILM COATED ORAL at 14:39

## 2023-12-18 RX ADMIN — APIXABAN 2.5 MG: 2.5 TABLET, FILM COATED ORAL at 08:16

## 2023-12-18 RX ADMIN — APIXABAN 2.5 MG: 2.5 TABLET, FILM COATED ORAL at 17:29

## 2023-12-18 RX ADMIN — POTASSIUM CHLORIDE 40 MEQ: 1500 TABLET, EXTENDED RELEASE ORAL at 08:16

## 2023-12-18 RX ADMIN — CEFTRIAXONE 1000 MG: 1 INJECTION, SOLUTION INTRAVENOUS at 23:15

## 2023-12-18 RX ADMIN — Medication 12.5 MG: at 08:16

## 2023-12-18 RX ADMIN — Medication 12.5 MG: at 22:03

## 2023-12-18 RX ADMIN — ACETAMINOPHEN 975 MG: 325 TABLET, FILM COATED ORAL at 22:03

## 2023-12-18 RX ADMIN — FOLIC ACID 1 MG: 1 TABLET ORAL at 08:16

## 2023-12-18 RX ADMIN — MULTIPLE VITAMINS W/ MINERALS TAB 1 TABLET: TAB ORAL at 08:16

## 2023-12-18 NOTE — CASE MANAGEMENT
Case Management Assessment & Discharge Planning Note    Patient name Amanda Gonzalez  Location /-01 MRN [id number]  : 1935 Date 2023       Current Admission Date: 12/15/2023  Current Admission Diagnosis:Atrial fibrillation with RVR (HCC)   Patient Active Problem List    Diagnosis Date Noted    Atrial fibrillation with RVR (HCC) 2023    Hypertensive urgency 12/15/2023    Acute cystitis without hematuria 12/15/2023    Ambulatory dysfunction 12/15/2023    Primary osteoarthritis of left hip 2021    Lumbar spondylosis 07/10/2019    Hip pain, chronic, left 07/10/2019    Chronic pain syndrome 07/10/2019    Back pain 2017    Bilateral low back pain without sciatica 2017    Greater trochanteric bursitis of both hips 2017    Primary osteoarthritis of both knees 2016    Allergic rhinitis 2015    Colon polyps 2014    GERD (gastroesophageal reflux disease) 2014    Generalized osteoarthritis of multiple sites 2013    Hyperlipidemia 2013    Hypertension 2013      LOS (days): 2  Geometric Mean LOS (GMLOS) (days): 2.9  Days to GMLOS:0.8     OBJECTIVE:    Risk of Unplanned Readmission Score: 8.19         Current admission status: Inpatient       Preferred Pharmacy:   [facility] [address] [address]  Phone: [phone] Fax: [phone]    Primary Care Provider: Leila Mcknight DO    Primary Insurance: MEDICARE  Secondary Insurance:     ASSESSMENT:  Active Health Care Proxies       Rachel Youssef [facility] Representative - Child   Primary Phone: [phone] (Mobile)  Home Phone: [phone]                 Advance Directives  Does patient have a Health Care POA?: No  Was patient offered paperwork?: Yes (declined)  Does patient currently have a Health Care decision maker?: Yes, please see Health Care Proxy section  Does patient have Advance Directives?:  No  Was patient offered paperwork?: Yes (declined)    Readmission Root Cause  30 Day Readmission: No    Patient Information  Admitted from:: Home  Mental Status: Alert  During Assessment patient was accompanied by: Not accompanied during assessment  Assessment information provided by:: Patient  Primary Caregiver: Self  Support Systems: Daughter  Home entry access options. Select all that apply.: Stairs  Number of steps to enter home.: 2  Type of Current Residence: [facility]  Living Arrangements: Lives Alone  Is patient a ?: No    Activities of Daily Living Prior to Admission  Functional Status: Independent  Completes ADLs independently?: Yes  Ambulates independently?: Yes  Does patient use assisted devices?: No  Does patient currently own DME?: No  Does patient have a history of Outpatient Therapy (PT/OT)?: No  Does the patient have a history of Short-Term Rehab?: No  Does patient have a history of HHC?: No  Does patient currently have HHC?: No    Patient Information Continued  Does patient have prescription coverage?: Yes  Does patient receive dialysis treatments?: No  Does patient have a history of substance abuse?: No  Does patient have a history of Mental Health Diagnosis?: No    Means of Transportation  Means of Transport to Appts:: Family transport      Housing Stability: Low Risk  (12/18/2023)    Housing Stability Vital Sign     Unable to Pay for Housing in the Last Year: No     Number of Places Lived in the Last Year: 1     Unstable Housing in the Last Year: No   Food Insecurity: No Food Insecurity (12/18/2023)    Hunger Vital Sign     Worried About Running Out of Food in the Last Year: Never true     Ran Out of Food in the Last Year: Never true   Transportation Needs: No Transportation Needs (12/18/2023)    PRAPARE - Transportation     Lack of Transportation (Medical): No     Lack of Transportation (Non-Medical): No   Utilities: Not At Risk (12/18/2023)    [facility] Utilities     Threatened with loss of  utilities: No       DISCHARGE DETAILS:    Discharge planning discussed with:: patient and Rachel (daughter)  Freedom of Choice: Yes  Comments - Freedom of Choice: Discussed therapies recommendation for rehab; pt and daughter are agreeable.  CM contacted family/caregiver?: Yes  Were Treatment Team discharge recommendations reviewed with patient/caregiver?: Yes  Did patient/caregiver verbalize understanding of patient care needs?: Yes  Were patient/caregiver advised of the risks associated with not following Treatment Team discharge recommendations?: Yes    Contacts  Patient Contacts: Rachel  Relationship to Patient:: Family  Contact Method: In Person  Reason/Outcome: Referral, Discharge Planning    Requested Home Health Care         Is the patient interested in HHC at discharge?: No    DME Referral Provided  Referral made for DME?: No    Other Referral/Resources/Interventions Provided:  Interventions: Short Term Rehab  Referral Comments: Referrals to SNF's within a 10 mile radius    Treatment Team Recommendation: Short Term Rehab  Discharge Destination Plan:: Short Term Rehab  Transport at Discharge : BLS Ambulance     IMM Given (Date):: 12/18/23  IMM Given to:: Patient     Additional Comments: Met with pt and pt's daughter Rachel to discuss the role of CM and to discuss any help pt may need prior to dc. pt lives alone yaya 1st floor home with 1-2 ADRIENNE. Pt performed ADL's indptly pta, no use of DME. no hx of HHC or rehab. No hx of mental health or D&A treatment. Pt's preferred [facility] is [facility]. CM discussed therapies recommendation for rehab; pt and daughter are agreeable. AIDIN referrals sent to SNF's within a 10 mile radius.

## 2023-12-18 NOTE — ASSESSMENT & PLAN NOTE
Straight cath UA suspicious for UTI  Not meeting SIRS/Sepsis   CT with imaging suspicious for cystitis  Continue IV ceftriaxone (day 4)  Urine culture positive --> follow up ID panel

## 2023-12-18 NOTE — PROGRESS NOTES
[facility]  Progress Note  Name: Amanda Gonzalez I  MRN: [id number]  Unit/Bed#: -01 I Date of Admission: 12/15/2023   Date of Service: 12/18/2023 I Hospital Day: 2    Assessment/Plan   * Atrial fibrillation with RVR (HCC)  Assessment & Plan  Upon arrival to the floor, patient was found A-fib with RVR, status post 1 dose of IV Lopressor  Patient also having UTI  Cardiology following   Echo pending   Initiated on Metoprolol 12.5 mg BID   Initiated on Eliquis 2.5 mg BID --> send for price check today   Converted to NSR --> continue medications + outpatient follow up     Acute cystitis without hematuria  Assessment & Plan  Straight cath UA suspicious for UTI  Not meeting SIRS/Sepsis   CT with imaging suspicious for cystitis  Continue IV ceftriaxone (day 4)  Urine culture positive --> follow up ID panel     Primary osteoarthritis of left hip  Assessment & Plan  Initially presented due to hip pain   Longstanding history of left hip arthritis with pain and ambulatory dysfunction as a result  Has seen by outpatient orthopedics Dr Salazar, received steroid injection about 4 months ago  CT: Advanced osteoarthritic degenerative changes involving the left hip with acetabulum protrusio noted.   Discussed with ortho --> continue conservative management + outpatient follow up   PT OT eval's pending    Ambulatory dysfunction  Assessment & Plan  Secondary to left hip pain, alcohol use, UTI  Lives home alone  PT/OT evaluations  Supportive care and safe ambulation with fall precautions    Hypertensive urgency  Assessment & Plan  Initially presented with hypertensive urgency which improved  Home lisinopril then held 2/2 hypotension   SBP currently stable in 130s on metoprolol 12.5 mg BID    Hypokalemia-resolved as of 12/18/2023  Assessment & Plan  Potassium 3.2 on admit  Replete and recheck               VTE Pharmacologic Prophylaxis: VTE Score: 5 High Risk (Score >/= 5) - Pharmacological DVT  Prophylaxis Ordered: apixaban (Eliquis). Sequential Compression Devices Ordered.    Mobility:   Basic Mobility Inpatient Raw Score: 17  JH-HLM Goal: 5: Stand one or more mins  JH-HLM Achieved: 6: Walk 10 steps or more  HLM Goal achieved. Continue to encourage appropriate mobility.    Patient Centered Rounds: I performed bedside rounds with nursing staff today.   Discussions with Specialists or Other Care Team Provider: Ortho     Education and Discussions with Family / Patient: Attempted to update  (daughter) via phone. Left voicemail.     Total Time Spent on Date of Encounter in care of patient: 30 mins. This time was spent on one or more of the following: performing physical exam; counseling and coordination of care; obtaining or reviewing history; documenting in the medical record; reviewing/ordering tests, medications or procedures; communicating with other healthcare professionals and discussing with patient's family/caregivers.    Current Length of Stay: 2 day(s)  Current Patient Status: Inpatient   Certification Statement: The patient will continue to require additional inpatient hospital stay due to Urine culture   Discharge Plan: Anticipate discharge in 24-48 hrs to discharge location to be determined pending rehab evaluations.    Code Status: Level 3 - DNAR and DNI    Subjective:   Patient is very tangential. She denies CP or SOB or abdominal symptoms. She reports intermittent pain in L hip is most bothersome symptom. States that afib is a lie and feels her heart is fine.     Objective:     Vitals:   Temp (24hrs), Av.6 °F (36.4 °C), Min:97 °F (36.1 °C), Max:98.1 °F (36.7 °C)    Temp:  [97 °F (36.1 °C)-98.1 °F (36.7 °C)] 97.7 °F (36.5 °C)  HR:  [54-94] 54  Resp:  [15-18] 18  BP: (125-130)/(84-90) 130/86  SpO2:  [95 %-97 %] 97 %  Body mass index is 20.14 kg/m².     Input and Output Summary (last 24 hours):   No intake or output data in the 24 hours ending 23 1409    Physical Exam:    Physical Exam  Vitals and nursing note reviewed.   Constitutional:       General: She is not in acute distress.     Appearance: She is well-developed. She is not ill-appearing.   HENT:      Head: Normocephalic and atraumatic.   Eyes:      General:         Right eye: No discharge.         Left eye: No discharge.      Extraocular Movements: Extraocular movements intact.      Conjunctiva/sclera: Conjunctivae normal.   Cardiovascular:      Rate and Rhythm: Normal rate and regular rhythm.      Heart sounds: No murmur heard.  Pulmonary:      Effort: Pulmonary effort is normal. No respiratory distress.      Breath sounds: Normal breath sounds. No wheezing, rhonchi or rales.   Abdominal:      General: Bowel sounds are normal. There is no distension.      Palpations: Abdomen is soft.      Tenderness: There is no abdominal tenderness.   Musculoskeletal:      Cervical back: Neck supple.      Right lower leg: No edema.      Left lower leg: No edema.   Skin:     General: Skin is warm and dry.      Capillary Refill: Capillary refill takes less than 2 seconds.   Neurological:      General: No focal deficit present.      Mental Status: She is alert and oriented to person, place, and time. Mental status is at baseline.      Cranial Nerves: No cranial nerve deficit.   Psychiatric:         Mood and Affect: Mood normal.         Behavior: Behavior normal.          Additional Data:     Labs:  Results from last 7 days   Lab Units 12/18/23  0505   WBC Thousand/uL 4.96   HEMOGLOBIN g/dL 11.7   HEMATOCRIT % 36.3   PLATELETS Thousands/uL 239   NEUTROS PCT % 52   LYMPHS PCT % 35   MONOS PCT % 10   EOS PCT % 2     Results from last 7 days   Lab Units 12/18/23  0505 12/17/23  0405   SODIUM mmol/L 138 139   POTASSIUM mmol/L 3.6 3.2*   CHLORIDE mmol/L 109* 108   CO2 mmol/L 25 25   BUN mg/dL 16 21   CREATININE mg/dL 0.66 0.78   ANION GAP mmol/L 4 6   CALCIUM mg/dL 8.4 8.3*   ALBUMIN g/dL  --  2.7*   TOTAL BILIRUBIN mg/dL  --  0.34   ALK PHOS U/L   --  43   ALT U/L  --  4*   AST U/L  --  12*   GLUCOSE RANDOM mg/dL 126 86                       Lines/Drains:  Invasive Devices       Peripheral Intravenous Line  Duration             Peripheral IV 12/16/23 Left;Ventral (anterior) Forearm 2 days                      Telemetry:  Telemetry Orders (From admission, onward)               24 Hour Telemetry Monitoring  Continuous x 24 Hours (Telem)        Question:  Reason for 24 Hour Telemetry  Answer:  Arrhythmias requiring acute medical intervention / PPM or ICD malfunction                     Telemetry Reviewed: Sinus Bradycardia  Indication for Continued Telemetry Use: Arrthymias requiring medical therapy             Imaging: Reviewed radiology reports from this admission including: abdominal/pelvic CT    Recent Cultures (last 7 days):   Results from last 7 days   Lab Units 12/15/23  2240   URINE CULTURE  >100,000 cfu/ml Citrobacter freundii*  >100,000 cfu/ml Aerococcus urinae*       Last 24 Hours Medication List:   Current Facility-Administered Medications   Medication Dose Route Frequency Provider Last Rate    acetaminophen  975 mg Oral Q8H [facility] Carlos Montesinos PA-C      apixaban  2.5 mg Oral BID Linda Bill MD      cefTRIAXone  1,000 mg Intravenous Q24H Carlos Montesinos PA-C 1,000 mg (12/17/23 1781)    folic acid  1 mg Oral Daily Carlos Montesinos PA-C      lidocaine  1 patch Topical Daily Carlos Montesinos PA-C      metoprolol tartrate  12.5 mg Oral Q12H [facility] Alex Pires PA-C      multivitamin-minerals  1 tablet Oral Daily Carlos Montesinos PA-C      ondansetron  4 mg Intravenous Q6H PRN Carlos Montesinos PA-C      thiamine  100 mg Oral Daily Carlos Montesinos PA-C          Today, Patient Was Seen By: Norma Singh PA-C    **Please Note: This note may have been constructed using a voice recognition system.**

## 2023-12-18 NOTE — DISCHARGE SUMMARY
[facility]  Discharge- Amanda Gonzalez 1935, 88 y.o. female MRN: [id number]  Unit/Bed#: -01 Encounter: [id number]  Primary Care Provider: Leila Mcknight DO   Date and time admitted to hospital: 12/15/2023  6:15 PM    * Atrial fibrillation with RVR (HCC)  Assessment & Plan  Upon arrival to the floor, patient was found A-fib with RVR, status post 1 dose of IV Lopressor  Patient also having UTI  Cardiology following   Echo - EF 55% normal systolic and diastolic function  Initiated on Metoprolol 12.5 mg BID   Initiated on Eliquis 2.5 mg BID   Converted to NSR, HR 50-60s   Cleared for DC per cardiology   OP follow up     Acute cystitis without hematuria  Assessment & Plan  Straight cath UA suspicious for UTI  Not meeting SIRS/Sepsis   CT with imaging suspicious for cystitis  Continue IV ceftriaxone (day 4)  Urine culture positive --> Aerococcus and Citrobacter  Aerococcus susceptible to penicillins, cephalosporins, nitrofurantoin  Citrobacter sensitive to third-generation cephalosporins --> DC on 3 days cefdinir 300 mg by mouth twice daily to complete 7-day course    Primary osteoarthritis of left hip  Assessment & Plan  Initially presented due to hip pain   Longstanding history of left hip arthritis with pain and ambulatory dysfunction as a result  Has seen by outpatient orthopedics Dr Salazar, received steroid injection about 4 months ago  CT: Advanced osteoarthritic degenerative changes involving the left hip with acetabulum protrusio noted.   Discussed with ortho --> continue conservative management + outpatient follow up   PT OT eval's -recommend postacute rehab, patient and family agreeable, discharge today    Ambulatory dysfunction  Assessment & Plan  Secondary to left hip pain, alcohol use, UTI  Lives home alone  PT/OT evaluations  Supportive care and safe ambulation with fall precautions    Hypertensive urgency  Assessment & Plan  Initially presented with hypertensive  urgency which improved  Home lisinopril then held 2/2 hypotension --> switched to metoprolol 12.5 mg twice daily only  SBP currently stable in 130s on metoprolol 12.5 mg BID        Medical Problems       Resolved Problems  Date Reviewed: 12/19/2023            Resolved    Hypokalemia 12/18/2023     Resolved by  Norma Singh PA-C        Discharging Physician / Practitioner: Norma Singh PA-C  PCP: Leila Mcknight DO  Admission Date:   Admission Orders (From admission, onward)       Ordered        12/16/23 1419  Inpatient Admission  Once            12/15/23 2218  Place in Observation  Once                          Discharge Date: 12/19/23    Consultations During Hospital Stay:  Cardiology     Procedures Performed:   None     Significant Findings / Test Results:   CT abdomen pelvis wo contrast   Final Result by Troy Quezada MD (12/15 2123)      Advanced osteoarthritic degenerative changes involving the left hip with acetabulum protrusio noted.      Mild bladder wall thickening. Correlate for possible cystitis.      Gallstones without surrounding inflammation.                        Workstation performed: [id number]           TTE 12/18:   Left Ventricle: Left ventricular cavity size is normal. Wall thickness is moderately increased. The left ventricular ejection fraction is 55%. Systolic function is normal. Wall motion is normal. Diastolic function is normal.    Right Ventricle: Right ventricular cavity size is normal. Systolic function is normal.    Aortic Valve: There is mild to moderate regurgitation. There is aortic valve sclerosis.    Mitral Valve: There is moderate thickening. There is mild calcification. There is annular calcification. There is moderate regurgitation.    Tricuspid Valve: There is mild regurgitation.    Pulmonic Valve: There is mild regurgitation.    Aorta: The aortic root is normal in size. The ascending aorta is mildly dilated. The aortic root exhibited moderate  "fibrocalcific change. The aortic root is 3.30 cm. The ascending aorta is 3.7 cm.    Incidental Findings:   None     Test Results Pending at Discharge (will require follow up):   None      Outpatient Tests Requested:  None     Complications:  None     Reason for Admission: Left hip pain     Hospital Course:   Amanda Gonzalez is a 88 y.o. female patient with PMH of HTN, ETOH use, severe OA who originally presented to the hospital on 12/15/2023 due to left hip pain causing inability to ambulate. CT A/P showed severe OA of left hip, which was discussed with orthopedics who recommended conservative management + outpatient follow up. Patient was diagnosed with UTI and initiated on IV rocephin. UC grew Citrobacter sensitive to third-generation cephalosporins and Aerococcus inherently sensitive to cephalosporins.  Patient received 4 days of IV Rocephin and will be discharged on 3 days of cefdinir to complete 7-day course.  She did not meet sepsis criteria. Patient was evaluated by PT/OT who recommended post-acute rehab. She will be discharged to [facility].     Notably patient was evaluated by Cardiology during admission due to new onset afib with RVR/tachy anastasia syndrome. Patient was initiated on PO eliquis 2.5 mg BID and metoprolol 12.5 mg BID. Patient should follow up with PCP + cardiology.     Please see above list of diagnoses and related plan for additional information.     Condition at Discharge: stable    Discharge Day Visit / Exam:   Subjective: Patient denies any complaints.  Verbalized understanding of plans for discharge today.  Vitals: Blood Pressure: (!) 171/83 (12/19/23 1457)  Pulse: 56 (12/19/23 1457)  Temperature: 98.8 °F (37.1 °C) (12/19/23 1457)  Temp Source: Axillary (12/15/23 2340)  Respirations: 18 (12/19/23 1457)  Height: 5' 5\" (165.1 cm) (12/18/23 1005)  Weight - Scale: 54.9 kg (121 lb) (12/18/23 1005)  SpO2: 96 % (12/19/23 1457)  Exam:   Physical Exam  Vitals and nursing note reviewed. "   Constitutional:       General: She is not in acute distress.     Appearance: She is well-developed. She is not ill-appearing.   HENT:      Head: Normocephalic and atraumatic.   Eyes:      General:         Right eye: No discharge.         Left eye: No discharge.      Extraocular Movements: Extraocular movements intact.      Conjunctiva/sclera: Conjunctivae normal.   Cardiovascular:      Rate and Rhythm: Normal rate and regular rhythm.      Heart sounds: No murmur heard.  Pulmonary:      Effort: Pulmonary effort is normal. No respiratory distress.      Breath sounds: Normal breath sounds. No wheezing, rhonchi or rales.   Abdominal:      General: Bowel sounds are normal. There is no distension.      Palpations: Abdomen is soft.      Tenderness: There is no abdominal tenderness.   Musculoskeletal:      Cervical back: Neck supple.      Right lower leg: No edema.      Left lower leg: No edema.   Skin:     General: Skin is warm and dry.      Capillary Refill: Capillary refill takes less than 2 seconds.   Neurological:      General: No focal deficit present.      Mental Status: She is alert and oriented to person, place, and time. Mental status is at baseline.      Cranial Nerves: No cranial nerve deficit.   Psychiatric:         Mood and Affect: Mood normal.         Behavior: Behavior normal.          Discussion with Family: Attempted to update  (daughter) via phone. Left voicemail.     Discharge instructions/Information to patient and family:   See after visit summary for information provided to patient and family.      Provisions for Follow-Up Care:  See after visit summary for information related to follow-up care and any pertinent home health orders.      Mobility at time of Discharge:   Basic Mobility Inpatient Raw Score: 17  JH-HLM Goal: 5: Stand one or more mins  JH-HLM Achieved: 4: Move to chair/commode  HLM Goal achieved. Continue to encourage appropriate mobility.     Disposition:   Acute Rehab at  [facility]    Planned Readmission: None      Discharge Statement:  I spent 60 minutes discharging the patient. This time was spent on the day of discharge. I had direct contact with the patient on the day of discharge. Greater than 50% of the total time was spent examining patient, answering all patient questions, arranging and discussing plan of care with patient as well as directly providing post-discharge instructions.  Additional time then spent on discharge activities.    Discharge Medications:  See after visit summary for reconciled discharge medications provided to patient and/or family.      **Please Note: This note may have been constructed using a voice recognition system**

## 2023-12-18 NOTE — PROGRESS NOTES
Progress Note - Cardiology   Amanda Gonzalez 88 y.o. female MRN: [id number]  Unit/Bed#: -01 Encounter: [id number]        Problem List:  Principal Problem:    Atrial fibrillation with RVR (HCC)  Active Problems:    Primary osteoarthritis of left hip    Hypertensive urgency    Acute cystitis without hematuria    Ambulatory dysfunction    Hypokalemia      Assessment:  Generalized weakness, hip pain (chief complaints)  New onset atrial fibrillation (reason for consult)  12/16/2023 initial diagnosis  12/16/2023 AC initiated with Eliquis (CV score 4)  Rate control: Metoprolol tartrate 12.5 twice daily  Acute UTI  Osteoarthritis    Plan/ Discussion:  She has converted to sinus rhythm and sinus bradycardia sometimes with rates 55-70  Continue Eliquis dose reduced for age > 80 and weight < 60 kg  Continue metoprolol tartrate 12.5 twice daily  Echo pending    Subjective:  Still having bilateral hip pain which is bothering her  No chest pain or shortness of breath    Vitals:  Vitals:    12/15/23 2300 12/15/23 2340   Weight: 55.5 kg (122 lb 5.7 oz) 55 kg (121 lb 4.1 oz)   ,  Vitals:    12/17/23 0729 12/17/23 1421 12/17/23 2043 12/18/23 0759   BP: 114/65 127/84 130/86 125/90   Pulse: 86 94 89 91   Resp: 16 15  18   Temp: (!) 97.4 °F (36.3 °C) (!) 97 °F (36.1 °C) 98.1 °F (36.7 °C) 97.7 °F (36.5 °C)   TempSrc:       SpO2: 97% 95% 96% 97%   Weight:       Height:           Exam:  General: Alert awake and oriented, no acute distress  Heart:  Regular rate and rhythm, no murmurs, Normal S1, no edema    Respiratory effort/ Lungs:  Breathing comfortably on room air, clear bilaterally without wheezing, rales, crackles   Abdominal: Non-tender to palpation, + bowel sounds, soft, no masses or distension  Lower Limbs:  No edema            Telemetry:       Normal sinus rhythm, , bradycardic, Heart Rate 55-70    Medications:    Current Facility-Administered Medications:     acetaminophen (TYLENOL) tablet 975 mg, 975 mg, Oral, Q8H [facility],  Carlos Montesinos PA-C, 975 mg at 12/16/23 2111    apixaban (ELIQUIS) tablet 2.5 mg, 2.5 mg, Oral, BID, Linda Bill MD, 2.5 mg at 12/18/23 0816    cefTRIAXone (ROCEPHIN) IVPB (premix in dextrose) 1,000 mg 50 mL, 1,000 mg, Intravenous, Q24H, Carlos Montesinos PA-C, Last Rate: 100 mL/hr at 12/17/23 2354, 1,000 mg at 12/17/23 2354    folic acid (FOLVITE) tablet 1 mg, 1 mg, Oral, Daily, Carlos Montesinos PA-C, 1 mg at 12/18/23 0816    hydrALAZINE (APRESOLINE) injection 5 mg, 5 mg, Intravenous, Q6H PRN, Carlos Montesinos PA-C    lidocaine (LIDODERM) 5 % patch 1 patch, 1 patch, Topical, Daily, Carlos Montesinos PA-C, 1 patch at 12/18/23 0818    metoprolol (LOPRESSOR) injection 2.5 mg, 2.5 mg, Intravenous, Q6H PRN, Linda Bill MD    metoprolol tartrate (LOPRESSOR) partial tablet 12.5 mg, 12.5 mg, Oral, Q12H JITENDRA, Carlos Montesinos PA-C, 12.5 mg at 12/18/23 0816    multivitamin-minerals (CENTRUM) tablet 1 tablet, 1 tablet, Oral, Daily, Carlos Montesinos PA-C, 1 tablet at 12/18/23 0816    ondansetron (ZOFRAN) injection 4 mg, 4 mg, Intravenous, Q6H PRN, Carols Montesinos PA-C    thiamine tablet 100 mg, 100 mg, Oral, Daily, Carlos Montesinos PA-C, 100 mg at 12/18/23 0816      Labs/Data:        Results from last 7 days   Lab Units 12/18/23  0505 12/17/23  0405 12/16/23  0401   WBC Thousand/uL 4.96 4.40 3.21*   HEMOGLOBIN g/dL 11.7 11.2* 12.1   HEMATOCRIT % 36.3 34.3* 36.4   PLATELETS Thousands/uL 239 237 269     Results from last 7 days   Lab Units 12/18/23  0505 12/17/23  0405 12/16/23  0401   POTASSIUM mmol/L 3.6 3.2* 3.2*   CHLORIDE mmol/L 109* 108 107   CO2 mmol/L 25 25 26   BUN mg/dL 16 21 14   CREATININE mg/dL 0.66 0.78 0.63

## 2023-12-18 NOTE — ASSESSMENT & PLAN NOTE
Initially presented with hypertensive urgency which improved  Home lisinopril then held 2/2 hypotension   SBP currently stable in 130s on metoprolol 12.5 mg BID

## 2023-12-18 NOTE — ASSESSMENT & PLAN NOTE
Upon arrival to the floor, patient was found A-fib with RVR, status post 1 dose of IV Lopressor  Patient also having UTI  Cardiology following   Echo pending   Initiated on Metoprolol 12.5 mg BID   Initiated on Eliquis 2.5 mg BID --> send for price check today   Converted to NSR, HR 50-60s   Continue current medications, if recurrent RVR may consider antiarrhythmic vs PPM

## 2023-12-18 NOTE — ASSESSMENT & PLAN NOTE
Initially presented due to hip pain   Longstanding history of left hip arthritis with pain and ambulatory dysfunction as a result  Has seen by outpatient orthopedics Dr Salazar, received steroid injection about 4 months ago  CT: Advanced osteoarthritic degenerative changes involving the left hip with acetabulum protrusio noted.   Discussed with ortho --> continue conservative management + outpatient follow up   PT EPHRAIM lozano's pending

## 2023-12-18 NOTE — PLAN OF CARE
Problem: Potential for Falls  Goal: Patient will remain free of falls  Description: INTERVENTIONS:  - Educate patient/family on patient safety including physical limitations  - Instruct patient to call for assistance with activity   - Consult OT/PT to assist with strengthening/mobility   - Keep Call bell within reach  - Keep bed low and locked with side rails adjusted as appropriate  - Keep care items and personal belongings within reach  - Initiate and maintain comfort rounds  - Make Fall Risk Sign visible to staff  - Offer Toileting every 2 Hours, in advance of need  - Initiate/Maintain bed alarm  - Obtain necessary fall risk management equipment: alarm, socks  - Apply yellow socks and bracelet for high fall risk patients  - Consider moving patient to room near nurses station  Outcome: Progressing

## 2023-12-19 VITALS
HEART RATE: 56 BPM | OXYGEN SATURATION: 96 % | DIASTOLIC BLOOD PRESSURE: 83 MMHG | BODY MASS INDEX: 20.16 KG/M2 | WEIGHT: 121 LBS | RESPIRATION RATE: 18 BRPM | HEIGHT: 65 IN | SYSTOLIC BLOOD PRESSURE: 171 MMHG | TEMPERATURE: 98.8 F

## 2023-12-19 DIAGNOSIS — I48.91 ATRIAL FIBRILLATION WITH RVR (HCC): Primary | ICD-10-CM

## 2023-12-19 LAB
ANION GAP SERPL CALCULATED.3IONS-SCNC: 3 MMOL/L
BACTERIA UR CULT: ABNORMAL
BACTERIA UR CULT: ABNORMAL
BASOPHILS # BLD AUTO: 0.04 THOUSANDS/ÂΜL (ref 0–0.1)
BASOPHILS NFR BLD AUTO: 1 % (ref 0–1)
BUN SERPL-MCNC: 16 MG/DL (ref 5–25)
CALCIUM SERPL-MCNC: 8.6 MG/DL (ref 8.4–10.2)
CHLORIDE SERPL-SCNC: 108 MMOL/L (ref 96–108)
CO2 SERPL-SCNC: 25 MMOL/L (ref 21–32)
CREAT SERPL-MCNC: 0.7 MG/DL (ref 0.6–1.3)
EOSINOPHIL # BLD AUTO: 0.18 THOUSAND/ÂΜL (ref 0–0.61)
EOSINOPHIL NFR BLD AUTO: 4 % (ref 0–6)
ERYTHROCYTE [DISTWIDTH] IN BLOOD BY AUTOMATED COUNT: 13.8 % (ref 11.6–15.1)
GFR SERPL CREATININE-BSD FRML MDRD: 78 ML/MIN/1.73SQ M
GLUCOSE SERPL-MCNC: 95 MG/DL (ref 65–140)
HCT VFR BLD AUTO: 32.4 % (ref 34.8–46.1)
HGB BLD-MCNC: 10.4 G/DL (ref 11.5–15.4)
IMM GRANULOCYTES # BLD AUTO: 0.01 THOUSAND/UL (ref 0–0.2)
IMM GRANULOCYTES NFR BLD AUTO: 0 % (ref 0–2)
LYMPHOCYTES # BLD AUTO: 1.6 THOUSANDS/ÂΜL (ref 0.6–4.47)
LYMPHOCYTES NFR BLD AUTO: 39 % (ref 14–44)
MCH RBC QN AUTO: 28.9 PG (ref 26.8–34.3)
MCHC RBC AUTO-ENTMCNC: 32.1 G/DL (ref 31.4–37.4)
MCV RBC AUTO: 90 FL (ref 82–98)
MONOCYTES # BLD AUTO: 0.55 THOUSAND/ÂΜL (ref 0.17–1.22)
MONOCYTES NFR BLD AUTO: 13 % (ref 4–12)
NEUTROPHILS # BLD AUTO: 1.74 THOUSANDS/ÂΜL (ref 1.85–7.62)
NEUTS SEG NFR BLD AUTO: 43 % (ref 43–75)
NRBC BLD AUTO-RTO: 0 /100 WBCS
PLATELET # BLD AUTO: 224 THOUSANDS/UL (ref 149–390)
PMV BLD AUTO: 10.2 FL (ref 8.9–12.7)
POTASSIUM SERPL-SCNC: 4.5 MMOL/L (ref 3.5–5.3)
RBC # BLD AUTO: 3.6 MILLION/UL (ref 3.81–5.12)
SODIUM SERPL-SCNC: 136 MMOL/L (ref 135–147)
WBC # BLD AUTO: 4.12 THOUSAND/UL (ref 4.31–10.16)

## 2023-12-19 PROCEDURE — 85025 COMPLETE CBC W/AUTO DIFF WBC: CPT | Performed by: INTERNAL MEDICINE

## 2023-12-19 PROCEDURE — 80048 BASIC METABOLIC PNL TOTAL CA: CPT | Performed by: INTERNAL MEDICINE

## 2023-12-19 PROCEDURE — 99239 HOSP IP/OBS DSCHRG MGMT >30: CPT

## 2023-12-19 PROCEDURE — 99232 SBSQ HOSP IP/OBS MODERATE 35: CPT | Performed by: INTERNAL MEDICINE

## 2023-12-19 RX ORDER — LIDOCAINE 50 MG/G
1 PATCH TOPICAL DAILY
Qty: 30 PATCH | Refills: 0
Start: 2023-12-20

## 2023-12-19 RX ORDER — LANOLIN ALCOHOL/MO/W.PET/CERES
100 CREAM (GRAM) TOPICAL DAILY
Qty: 30 TABLET | Refills: 0
Start: 2023-12-20 | End: 2024-01-19

## 2023-12-19 RX ORDER — FOLIC ACID 1 MG/1
1 TABLET ORAL DAILY
Qty: 30 TABLET | Refills: 0
Start: 2023-12-20 | End: 2024-01-19

## 2023-12-19 RX ORDER — CEFDINIR 300 MG/1
300 CAPSULE ORAL EVERY 12 HOURS SCHEDULED
Qty: 6 CAPSULE | Refills: 0
Start: 2023-12-19 | End: 2023-12-22

## 2023-12-19 RX ADMIN — FOLIC ACID 1 MG: 1 TABLET ORAL at 09:34

## 2023-12-19 RX ADMIN — Medication 12.5 MG: at 09:34

## 2023-12-19 RX ADMIN — LIDOCAINE 1 PATCH: 700 PATCH TOPICAL at 09:35

## 2023-12-19 RX ADMIN — MULTIPLE VITAMINS W/ MINERALS TAB 1 TABLET: TAB ORAL at 09:39

## 2023-12-19 RX ADMIN — APIXABAN 2.5 MG: 2.5 TABLET, FILM COATED ORAL at 09:34

## 2023-12-19 RX ADMIN — ACETAMINOPHEN 975 MG: 325 TABLET, FILM COATED ORAL at 14:26

## 2023-12-19 RX ADMIN — Medication 100 MG: at 09:34

## 2023-12-19 RX ADMIN — ACETAMINOPHEN 975 MG: 325 TABLET, FILM COATED ORAL at 04:13

## 2023-12-19 NOTE — DISCHARGE INSTR - AVS FIRST PAGE
Follow-up providers:  Please follow-up with outpatient cardiology within 1 to 2 weeks  Please follow-up with outpatient PCP within 1 to 2 weeks  Please follow-up with outpatient orthopedic surgery in 1 to 2 weeks for osteoarthritis of hips and knees    Medication changes:  Please begin taking cefdinir 300 mg by mouth twice daily for 3 days beginning evening of 12/19 for UTI.  Please begin taking metoprolol 12.5 mg by mouth twice daily for atrial fibrillation.  Please begin taking Eliquis 2.5 mg by mouth twice daily for atrial fibrillation  Please stop taking previous prescription of lisinopril      
Cara

## 2023-12-19 NOTE — ASSESSMENT & PLAN NOTE
Straight cath UA suspicious for UTI  Not meeting SIRS/Sepsis   CT with imaging suspicious for cystitis  Continue IV ceftriaxone (day 4)  Urine culture positive --> Aerococcus and Citrobacter  Aerococcus susceptible to penicillins, cephalosporins, nitrofurantoin  Citrobacter sensitive to third-generation cephalosporins --> DC on 3 days cefdinir 300 mg by mouth twice daily to complete 7-day course

## 2023-12-19 NOTE — ASSESSMENT & PLAN NOTE
Initially presented due to hip pain   Longstanding history of left hip arthritis with pain and ambulatory dysfunction as a result  Has seen by outpatient orthopedics Dr Salazar, received steroid injection about 4 months ago  CT: Advanced osteoarthritic degenerative changes involving the left hip with acetabulum protrusio noted.   Discussed with ortho --> continue conservative management + outpatient follow up   PT OT marta's -recommend postacute rehab, patient and family agreeable, discharge today

## 2023-12-19 NOTE — CASE MANAGEMENT
Case Management Discharge Planning Note    Patient name Amanda Gonzalez  Location /-01 MRN [id number]  : 1935 Date 2023       Current Admission Date: 12/15/2023  Current Admission Diagnosis:Atrial fibrillation with RVR (HCC)   Patient Active Problem List    Diagnosis Date Noted    Atrial fibrillation with RVR (HCC) 2023    Hypertensive urgency 12/15/2023    Acute cystitis without hematuria 12/15/2023    Ambulatory dysfunction 12/15/2023    Primary osteoarthritis of left hip 2021    Lumbar spondylosis 07/10/2019    Hip pain, chronic, left 07/10/2019    Chronic pain syndrome 07/10/2019    Back pain 2017    Bilateral low back pain without sciatica 2017    Greater trochanteric bursitis of both hips 2017    Primary osteoarthritis of both knees 2016    Allergic rhinitis 2015    Colon polyps 2014    GERD (gastroesophageal reflux disease) 2014    Generalized osteoarthritis of multiple sites 2013    Hyperlipidemia 2013    Hypertension 2013      LOS (days): 3  Geometric Mean LOS (GMLOS) (days): 2.9  Days to GMLOS:-0.1     OBJECTIVE:  Risk of Unplanned Readmission Score: 9.07         Current admission status: Inpatient   Preferred Pharmacy:   [facility] [address] [address]  Phone: [phone] Fax: [phone]    Primary Care Provider: Leila Mcknight DO    Primary Insurance: MEDICARE  Secondary Insurance:     DISCHARGE DETAILS:    Discharge planning discussed with:: patient and Rachel (daughter)  Freedom of Choice: Yes     Dispatcher Contacted: Yes  Number/Name of Dispatcher: [employer]  Transported by (Company and Unit #): [id number]  ETA of Transport (Date): 23  ETA of Transport (Time): 1600     Transfer Mode: Stretcher     Additional Comments: CM spoke with pt and pt's daughter Rachel to discuss SNF choices. They prefer [facility]. CM reserved  [facility] in [place]. CM was informed by Yojana at [facility] that pt is accepted and a bed is available today. CM arranged with MICKY JIMENEZ for a 4pm dc to [facility]. CM notified pt, pt's bedside RN Dani, pt's daughter Rachel, and Yojana at [facility] of dc time. Facility transfer form and CMN completed. CMN signed and placed in medical record bin.

## 2023-12-19 NOTE — ASSESSMENT & PLAN NOTE
Upon arrival to the floor, patient was found A-fib with RVR, status post 1 dose of IV Lopressor  Patient also having UTI  Cardiology following   Echo - EF 55% normal systolic and diastolic function  Initiated on Metoprolol 12.5 mg BID   Initiated on Eliquis 2.5 mg BID   Converted to NSR, HR 50-60s   Cleared for DC per cardiology   OP follow up

## 2023-12-19 NOTE — PLAN OF CARE
Problem: Potential for Falls  Goal: Patient will remain free of falls  Description: INTERVENTIONS:  - Educate patient/family on patient safety including physical limitations  - Instruct patient to call for assistance with activity   - Consult OT/PT to assist with strengthening/mobility   - Keep Call bell within reach  - Keep bed low and locked with side rails adjusted as appropriate  - Keep care items and personal belongings within reach  - Initiate and maintain comfort rounds  - Make Fall Risk Sign visible to staff  - Offer Toileting every 2 Hours, in advance of need  - Initiate/Maintain bed alarm  - Obtain necessary fall risk management equipment: alarm, socks  - Apply yellow socks and bracelet for high fall risk patients  - Consider moving patient to room near nurses station  Outcome: Progressing     Problem: INFECTION - ADULT  Goal: Absence or prevention of progression during hospitalization  Description: INTERVENTIONS:  - Assess and monitor for signs and symptoms of infection  - Monitor lab/diagnostic results  - Monitor all insertion sites, i.e. indwelling lines, tubes, and drains  - Monitor endotracheal if appropriate and nasal secretions for changes in amount and color  - [place] appropriate cooling/warming therapies per order  - Administer medications as ordered  - Instruct and encourage patient and family to use good hand hygiene technique  - Identify and instruct in appropriate isolation precautions for identified infection/condition  Outcome: Progressing     Problem: SAFETY ADULT  Goal: Patient will remain free of falls  Description: INTERVENTIONS:  - Educate patient/family on patient safety including physical limitations  - Instruct patient to call for assistance with activity   - Consult OT/PT to assist with strengthening/mobility   - Keep Call bell within reach  - Keep bed low and locked with side rails adjusted as appropriate  - Keep care items and personal belongings within reach  - Initiate and  maintain comfort rounds  - Make Fall Risk Sign visible to staff  - Offer Toileting every 2 Hours, in advance of need  - Initiate/Maintain bed alarm  - Obtain necessary fall risk management equipment: alarm, socks  - Apply yellow socks and bracelet for high fall risk patients  - Consider moving patient to room near nurses station  Outcome: Progressing     Problem: Prexisting or High Potential for Compromised Skin Integrity  Goal: Skin integrity is maintained or improved  Description: INTERVENTIONS:  - Identify patients at risk for skin breakdown  - Assess and monitor skin integrity  - Assess and monitor nutrition and hydration status  - Monitor labs   - Assess for incontinence   - Turn and reposition patient  - Assist with mobility/ambulation  - Relieve pressure over bony prominences  - Avoid friction and shearing  - Provide appropriate hygiene as needed including keeping skin clean and dry  - Evaluate need for skin moisturizer/barrier cream  - Collaborate with interdisciplinary team   - Patient/family teaching  - Consider wound care consult   Outcome: Progressing     Problem: PAIN - ADULT  Goal: Verbalizes/displays adequate comfort level or baseline comfort level  Description: Interventions:  - Encourage patient to monitor pain and request assistance  - Assess pain using appropriate pain scale  - Administer analgesics based on type and severity of pain and evaluate response  - Implement non-pharmacological measures as appropriate and evaluate response  - Consider cultural and social influences on pain and pain management  - Notify physician/advanced practitioner if interventions unsuccessful or patient reports new pain  Outcome: Not Progressing

## 2023-12-19 NOTE — ASSESSMENT & PLAN NOTE
Initially presented with hypertensive urgency which improved  Home lisinopril then held 2/2 hypotension --> switched to metoprolol 12.5 mg twice daily only  SBP currently stable in 130s on metoprolol 12.5 mg BID

## 2023-12-19 NOTE — PROGRESS NOTES
Progress Note - Cardiology   Amanda Gonzalez 88 y.o. female MRN: [id number]  Unit/Bed#: -01 Encounter: [id number]        Problem List:  Principal Problem:    Atrial fibrillation with RVR (HCC)  Active Problems:    Primary osteoarthritis of left hip    Hypertensive urgency    Acute cystitis without hematuria    Ambulatory dysfunction      Assessment:  Generalized weakness, hip pain (chief complaints)  New onset atrial fibrillation (reason for consult)  12/16/2023 initial diagnosis  12/16/2023 AC initiated with Eliquis (CV score 4)  12/18/2023 echo: LVEF 55%, normal diastolic function, normal RV size and systolic function, mild to moderate aortic insufficiency, moderate mitral insufficiency  Rate control: Metoprolol tartrate 12.5 twice daily  Tachybrady syndrome  With atrial fibrillation she has demonstrated propensity for RVR with rates up to the 150s, however now with starting low-dose of Lopressor 12.5 twice daily her rates are generally in the upper 50s. She probably has an element of tachybradycardia syndrome which may 1 day warrant pacemaker for further up titration of AV blocking agents, or use of antiarrhythmics (likely amiodarone)  Acute UTI  Osteoarthritis    Plan/ Discussion:  She continues to maintain sinus rhythm with occasional bradycardia in the upper 50s on telemetry  Continue Lopressor 12.5 twice daily  Continue Eliquis anticoagulation  No further inpatient workup is planned  Message sent for follow-up, we will sign off please call if needed    Subjective:  Feeling ok today but legs and back still hurt. No chest pain or dizziness    Vitals:  Vitals:    12/15/23 2340 12/18/23 1005   Weight: 55 kg (121 lb 4.1 oz) 54.9 kg (121 lb)   ,  Vitals:    12/18/23 1434 12/18/23 2203 12/18/23 2211 12/19/23 0200   BP: 115/68 125/62 125/62 128/65   Pulse: 65 60 58    Resp: 18      Temp: 97.9 °F (36.6 °C)      TempSrc:       SpO2: 96%  98%    Weight:       Height:           Exam:  General: Alert awake and  oriented, no acute distress  Heart:  Regular rate and rhythm, no murmurs, Normal S1, no edema    Respiratory effort/ Lungs:  Breathing comfortably on room air, clear bilaterally without wheezing, rales, crackles   Abdominal: Non-tender to palpation, + bowel sounds, soft, no masses or distension  Lower Limbs:  No edema            Telemetry:       bradycardic, Heart Rate 50's    Medications:    Current Facility-Administered Medications:     acetaminophen (TYLENOL) tablet 975 mg, 975 mg, Oral, Q8H [facility], Carlos Montesinos PA-C, 975 mg at 12/19/23 0413    apixaban (ELIQUIS) tablet 2.5 mg, 2.5 mg, Oral, BID, Linda Bill MD, 2.5 mg at 12/18/23 1729    cefTRIAXone (ROCEPHIN) IVPB (premix in dextrose) 1,000 mg 50 mL, 1,000 mg, Intravenous, Q24H, Carlos Montesinos PA-C, Last Rate: 100 mL/hr at 12/18/23 2315, 1,000 mg at 12/18/23 2315    folic acid (FOLVITE) tablet 1 mg, 1 mg, Oral, Daily, Carlos Montesinos PA-C, 1 mg at 12/18/23 0816    lidocaine (LIDODERM) 5 % patch 1 patch, 1 patch, Topical, Daily, Carlos Montesinos PA-C, 1 patch at 12/18/23 0818    metoprolol tartrate (LOPRESSOR) partial tablet 12.5 mg, 12.5 mg, Oral, Q12H [facility], Alex Pires PA-C, 12.5 mg at 12/18/23 2203    multivitamin-minerals (CENTRUM) tablet 1 tablet, 1 tablet, Oral, Daily, Carlos Montesinos PA-C, 1 tablet at 12/18/23 0816    ondansetron (ZOFRAN) injection 4 mg, 4 mg, Intravenous, Q6H PRN, Carlos Montesinos PA-C    thiamine tablet 100 mg, 100 mg, Oral, Daily, Carlos Montesinos PA-C, 100 mg at 12/18/23 0816      Labs/Data:        Results from last 7 days   Lab Units 12/19/23  0414 12/18/23  0505 12/17/23  0405   WBC Thousand/uL 4.12* 4.96 4.40   HEMOGLOBIN g/dL 10.4* 11.7 11.2*   HEMATOCRIT % 32.4* 36.3 34.3*   PLATELETS Thousands/uL 224 239 237     Results from last 7 days   Lab Units 12/19/23  0414 12/18/23  0505 12/17/23  0405   POTASSIUM mmol/L 4.5 3.6 3.2*   CHLORIDE mmol/L 108 109* 108   CO2 mmol/L 25 25 25   BUN mg/dL 16 16 21   CREATININE  mg/dL 0.70 0.66 0.78

## 2023-12-20 ENCOUNTER — TRANSITIONAL CARE MANAGEMENT (OUTPATIENT)
Dept: FAMILY MEDICINE CLINIC | Facility: CLINIC | Age: 88
End: 2023-12-20

## 2023-12-20 ENCOUNTER — TELEPHONE (OUTPATIENT)
Dept: CARDIOLOGY CLINIC | Facility: CLINIC | Age: 88
End: 2023-12-20

## 2023-12-20 NOTE — TELEPHONE ENCOUNTER
----- Message from Kaylee Koenig sent at 12/20/2023  8:37 AM EST -----  Regarding: RE: New pt referral  Called pt to schedule EP consult w/ Dr. Ibarra at the [place] office, pt declined appointment. She said that her heart is fine, they worked on her for 2 days and she got the all clear that her heart was perfect and that this appt is not needed. She is just in pain right now due to her hips and just wants her hips fixed so she can walk.     ----- Message -----  From: Alex Pires PA-C  Sent: 12/19/2023   3:09 PM EST  To: Cardiology Ep Clincal; Cardiology Ep Clerical  Subject: New pt referral                                  Neeru martinez Ms. Gonzalez get a new pt appt with one of the EP doctors for a-fib/ tachybrady syndrome? Ideally in [place]. Thank you, Alex

## 2024-01-16 ENCOUNTER — CONSULT (OUTPATIENT)
Dept: CARDIOLOGY CLINIC | Facility: CLINIC | Age: 89
End: 2024-01-16
Payer: MEDICARE

## 2024-01-16 VITALS
SYSTOLIC BLOOD PRESSURE: 140 MMHG | DIASTOLIC BLOOD PRESSURE: 80 MMHG | HEART RATE: 54 BPM | WEIGHT: 125 LBS | HEIGHT: 65 IN | BODY MASS INDEX: 20.83 KG/M2

## 2024-01-16 DIAGNOSIS — I48.91 ATRIAL FIBRILLATION WITH RVR (HCC): ICD-10-CM

## 2024-01-16 DIAGNOSIS — I10 PRIMARY HYPERTENSION: ICD-10-CM

## 2024-01-16 DIAGNOSIS — I48.0 PAROXYSMAL ATRIAL FIBRILLATION (HCC): Primary | ICD-10-CM

## 2024-01-16 DIAGNOSIS — I48.91 ATRIAL FIBRILLATION (HCC): ICD-10-CM

## 2024-01-16 PROCEDURE — 99213 OFFICE O/P EST LOW 20 MIN: CPT | Performed by: STUDENT IN AN ORGANIZED HEALTH CARE EDUCATION/TRAINING PROGRAM

## 2024-01-16 PROCEDURE — 93000 ELECTROCARDIOGRAM COMPLETE: CPT | Performed by: STUDENT IN AN ORGANIZED HEALTH CARE EDUCATION/TRAINING PROGRAM

## 2024-01-16 RX ORDER — LISINOPRIL 5 MG/1
5 TABLET ORAL DAILY
COMMUNITY

## 2024-01-16 RX ORDER — ACETAMINOPHEN 650 MG/1
650 SUPPOSITORY RECTAL EVERY 4 HOURS PRN
COMMUNITY

## 2024-01-16 RX ORDER — ACETAMINOPHEN 500 MG
500 TABLET ORAL EVERY 8 HOURS PRN
COMMUNITY

## 2024-01-16 NOTE — PROGRESS NOTES
HEART AND VASCULAR  CARDIAC ELECTROPHYSIOLOGY   HEART RHYTHM CENTER  [facility]    Outpatient New Consult care for evaluation and management of physical atrial fibrillation  Today's Date: 01/16/24        Patient name: Amanda Gonzalez  YOB: 1935  Sex: female         Chief Complaint: Paroxysmal atrial fibrillation      ASSESSMENT:  Problem List Items Addressed This Visit       Hypertension    Relevant Medications    lisinopril (ZESTRIL) 5 mg tablet    Atrial fibrillation with RVR (HCC)    Paroxysmal atrial fibrillation (HCC) - Primary    Relevant Orders    POCT ECG     Mr. Amanda Gonzalez is an 88 year old female with a past medical history of hypertension and paroxysmal atrial fibrillation that occurred in the setting of a UTI.  Started on Eliquis 2.5 mg p.o. twice daily, Toprol tartrate 12.5 mg p.o. twice daily.  Her EKG today reveals sinus bradycardia with a heart rate of 54 bpm and this appears to be consistent with prior measurements noted in EMR.    During this visit, the patient made quite clear that she is not interested in starting any new medications nor was she interested in having any procedures done.  Given that her atrial fibrillation occurred in the setting of UTI, and she is in normal sinus rhythm today, conservative measures are appropriate at this time.  She should continue Eliquis 2.5 mg p.o. twice daily and continue metoprolol tartrate 12.5 mg twice daily.  Heart rate in the 50s is appropriate as long as she is asymptomatic which she currently is.    She made it quite clear that she was not interested in any further interventions noted that she will not take any additional medications.  Should she have recurrence of atrial fibrillation in the future, this can be revisited.  Asked if she would like me to discuss this topic with her daughter but she declined this offer.        PLAN:  Paroxsymal atrial fibrillation  -Continue Eliquis 2.5 mg twice daily  -Continue  metoprolol tartrate 12.5 mg p.o. twice daily  -Patient is not interested in any further interventions or workup at this time    Hypertension:  -Blood pressure 140/80  -No medication changes        Follow up in: PRN          .............................................................................................    HPI/Subjective:     Ms. Amanda Gonzalez is an 88 year old female history of atrial fibrillation and tachybradycardia syndrome.  She reported to the hospital on December 16, 2023 at which time she was found to be in atrial fibrillation with RVR in the setting of a UTI for which she was started on a low-dose of beta-blocker.  She converted to normal sinus rhythm, and was found to be bradycardic with heart rates in the upper 50s on telemetry on low-dose Lopressor 12.5 mg twice daily.  Given her bradycardia, she was diagnosed with tachybradycardia syndrome, and she was referred to electrophysiology for further evaluation.    Today, she seems irritated at today's visit noting it was simply a check box to get her into her next nursing home.  Her speech was a bit pressured, and she left very little breaks in conversation making conversational extreme difficult.  It was also quite difficult to redirect the patient.  The patient's main complaint was left hip pain preventing her from walking.  She stated I was the wrong kind of doctor that she needed to see.      At the beginning of our discussion about A-fib she noted that she did not want any medications nor did she want any procedures.  She maintained this sentiment throughout our conversation.  She was not open to an involved discussion about atrial fibrillation including pathophysiology nor treatment options as she is not interested in any treatment at this time.  Other than leg pain, she has no complaints today.  She notes she has had every symptom at some point and is still living.      Complete 12 point ROS reviewed and otherwise non pertinent or  negative except as per HPI pertinent positives in Cardiovascular and Respiratory emphasized. Please see paper chart for outpatient clinic patients where the patient completed the 12 point ROS survey.           Past Medical History:   Diagnosis Date    Allergic rhinitis     Arthritis     Back pain     Colon polyps     GERD (gastroesophageal reflux disease)     Hyperlipidemia     Hypertension        Allergies   Allergen Reactions    Tramadol Headache     I reviewed the Home Medication list and Allergies in the chart.   Scheduled Meds:  Current Outpatient Medications   Medication Sig Dispense Refill    acetaminophen (TYLENOL) 500 mg tablet Take 500 mg by mouth every 8 (eight) hours as needed for mild pain      acetaminophen (TYLENOL) 650 mg suppository Insert 650 mg into the rectum every 4 (four) hours as needed for mild pain For temp > 100.4      apixaban (ELIQUIS) 2.5 mg Take 1 tablet (2.5 mg total) by mouth 2 (two) times a day 60 tablet 0    Diapers & Supplies MISC USE ONE, CHANGE EVERY 4 HOURS AS NEEDED for urine incontinence size large 100 each 5    folic acid (FOLVITE) 1 mg tablet Take 1 tablet (1 mg total) by mouth daily 30 tablet 0    lidocaine (LIDODERM) 5 % Apply 1 patch topically over 12 hours daily Remove & Discard patch within 12 hours or as directed by MD 30 patch 0    lisinopril (ZESTRIL) 5 mg tablet Take 5 mg by mouth daily      metoprolol tartrate (LOPRESSOR) 25 mg tablet Take 0.5 tablets (12.5 mg total) by mouth every 12 (twelve) hours 30 tablet 0    Sanitary Napkins & Tampons (KOTEX MAXI OVERNITE) PADS USE ONE, CHANGE EVERY 4 HOURS AS NEEDED for urine incontinence 100 each 5    thiamine 100 MG tablet Take 1 tablet (100 mg total) by mouth daily 30 tablet 0    aspirin 325 mg tablet Take by mouth       Diclofenac Sodium (VOLTAREN) 1 % Apply 2 g topically 4 (four) times a day for 19 days (Patient not taking: Reported on 1/16/2024) 150 g 0     No current facility-administered medications for this visit.  "    PRN Meds:.        Family History   Problem Relation Age of Onset    Breast cancer Sister     Cancer Brother         Social History     Socioeconomic History    Marital status:      Spouse name: Not on file    Number of children: Not on file    Years of education: Not on file    Highest education level: Not on file   Occupational History    Not on file   Tobacco Use    Smoking status: Former    Smokeless tobacco: Never    Tobacco comments:     quit 50 years ago   Substance and Sexual Activity    Alcohol use: Yes     Alcohol/week: 5.0 standard drinks of alcohol     Types: 5 Shots of liquor per week     Comment: patient states she drinks out of the bottle daily     Drug use: No    Sexual activity: Not Currently   Other Topics Concern    Not on file   Social History Narrative    Not on file     Social Determinants of Health     Financial Resource Strain: Not on file   Food Insecurity: No Food Insecurity (12/18/2023)    Hunger Vital Sign     Worried About Running Out of Food in the Last Year: Never true     Ran Out of Food in the Last Year: Never true   Transportation Needs: No Transportation Needs (12/18/2023)    PRAPARE - Transportation     Lack of Transportation (Medical): No     Lack of Transportation (Non-Medical): No   Physical Activity: Not on file   Stress: Not on file   Social Connections: Not on file   Intimate Partner Violence: Not on file   Housing Stability: Low Risk  (12/18/2023)    Housing Stability Vital Sign     Unable to Pay for Housing in the Last Year: No     Number of Places Lived in the Last Year: 1     Unstable Housing in the Last Year: No          OBJECTIVE:    /80 (BP Location: Left arm, Patient Position: Sitting, Cuff Size: Standard)   Pulse (!) 54   Ht 5' 5\" (1.651 m)   Wt 56.7 kg (125 lb)   LMP  (LMP Unknown)   BMI 20.80 kg/m²   Vitals:    01/16/24 1249   Weight: 56.7 kg (125 lb)     GEN: No acute distress, Alert and oriented, well appearing.  In a " "wheelchair.    CARDIOVASCULAR:  RRR, No murmur, rub, gallops S1,S2.  Sinus bradycardia on EKG  LUNGS: Clear To auscultation bilaterally, normal effort, no rales, rhonchi, crackles    ABDOMEN:   nondistended,  without obvious organomegaly or ascites  EXTREMITIES/VASCULAR:   No edema. warm an well perfused.  PSYCH: Normal Affect, pressured speech  GAIT: In a wheelchair       Lab Results:       LABS:      Chemistry        Component Value Date/Time     05/12/2015 1004    K 4.5 12/19/2023 0414    K 4.3 05/12/2015 1004     12/19/2023 0414     05/12/2015 1004    CO2 25 12/19/2023 0414    CO2 24 05/12/2015 1004    BUN 16 12/19/2023 0414    BUN 26 (H) 05/12/2015 1004    CREATININE 0.70 12/19/2023 0414    CREATININE 0.91 05/12/2015 1004        Component Value Date/Time    CALCIUM 8.6 12/19/2023 0414    CALCIUM 9.6 05/12/2015 1004    ALKPHOS 43 12/17/2023 0405    ALKPHOS 70 05/12/2015 1004    AST 12 (L) 12/17/2023 0405    AST 24 05/12/2015 1004    ALT 4 (L) 12/17/2023 0405    ALT 20 05/12/2015 1004    BILITOT 0.56 05/12/2015 1004            Lab Results   Component Value Date    CHOL 249 05/12/2015     Lab Results   Component Value Date    HDL 86 (H) 06/02/2017    HDL 91 05/12/2015     Lab Results   Component Value Date    LDLCALC 139 (H) 06/02/2017    LDLCALC 144 (H) 05/12/2015     Lab Results   Component Value Date    TRIG 82 06/02/2017    TRIG 71 05/12/2015     No results found for: \"CHOLHDL\"    IMAGING: Echo complete w/ contrast if indicated    Result Date: 12/18/2023  Narrative:   Left Ventricle: Left ventricular cavity size is normal. Wall thickness is moderately increased. The left ventricular ejection fraction is 55%. Systolic function is normal. Wall motion is normal. Diastolic function is normal.   Right Ventricle: Right ventricular cavity size is normal. Systolic function is normal.   Aortic Valve: There is mild to moderate regurgitation. There is aortic valve sclerosis.   Mitral Valve: There is " moderate thickening. There is mild calcification. There is annular calcification. There is moderate regurgitation.   Tricuspid Valve: There is mild regurgitation.   Pulmonic Valve: There is mild regurgitation.   Aorta: The aortic root is normal in size. The ascending aorta is mildly dilated. The aortic root exhibited moderate fibrocalcific change. The aortic root is 3.30 cm. The ascending aorta is 3.7 cm.        Cardiac testing:       I reviewed and interpreted the following LABS/EKG/TELE/IMAGING and below is summary of my interpretation (if data available):    Current EKG performed on January 16, 2024 reviewed by me personally reveals sinus bradycardia, non specific T wave abnormality    Past EKGs 12/26/23 - atrial fibrillation      ECHO 12/18/23    Left Ventricle: Left ventricular cavity size is normal. Wall thickness is moderately increased. The left ventricular ejection fraction is 55%. Systolic function is normal. Wall motion is normal. Diastolic function is normal.    Right Ventricle: Right ventricular cavity size is normal. Systolic function is normal.    Aortic Valve: There is mild to moderate regurgitation. There is aortic valve sclerosis.    Mitral Valve: There is moderate thickening. There is mild calcification. There is annular calcification. There is moderate regurgitation.    Tricuspid Valve: There is mild regurgitation.    Pulmonic Valve: There is mild regurgitation.    Aorta: The aortic root is normal in size. The ascending aorta is mildly dilated. The aortic root exhibited moderate fibrocalcific change. The aortic root is 3.30 cm. The ascending aorta is 3.7 cm.

## 2024-02-02 ENCOUNTER — TELEPHONE (OUTPATIENT)
Age: 89
End: 2024-02-02

## 2024-02-02 NOTE — TELEPHONE ENCOUNTER
Caller: Patient    Doctor: Marie    Reason for call: Patient calling to make sure she has transportation from nursing home to her appointment 2/7   Please advise     Call back#: [phone]

## 2024-02-05 NOTE — TELEPHONE ENCOUNTER
Amanda is at [facility] and while we were on the phone she remembered that she was being picked up on 2/7 for ortho appt at 9 am.

## 2024-02-07 ENCOUNTER — OFFICE VISIT (OUTPATIENT)
Dept: OBGYN CLINIC | Facility: CLINIC | Age: 89
End: 2024-02-07
Payer: MEDICARE

## 2024-02-07 VITALS
WEIGHT: 125 LBS | BODY MASS INDEX: 20.83 KG/M2 | DIASTOLIC BLOOD PRESSURE: 80 MMHG | SYSTOLIC BLOOD PRESSURE: 132 MMHG | HEIGHT: 65 IN

## 2024-02-07 DIAGNOSIS — M17.0 PRIMARY OSTEOARTHRITIS OF BOTH KNEES: Primary | ICD-10-CM

## 2024-02-07 PROCEDURE — 20610 DRAIN/INJ JOINT/BURSA W/O US: CPT | Performed by: ORTHOPAEDIC SURGERY

## 2024-02-07 PROCEDURE — 99213 OFFICE O/P EST LOW 20 MIN: CPT | Performed by: ORTHOPAEDIC SURGERY

## 2024-02-07 RX ORDER — BETAMETHASONE SODIUM PHOSPHATE AND BETAMETHASONE ACETATE 3; 3 MG/ML; MG/ML
6 INJECTION, SUSPENSION INTRA-ARTICULAR; INTRALESIONAL; INTRAMUSCULAR; SOFT TISSUE
Status: COMPLETED | OUTPATIENT
Start: 2024-02-07 | End: 2024-02-07

## 2024-02-07 RX ORDER — LIDOCAINE HYDROCHLORIDE 10 MG/ML
7 INJECTION, SOLUTION EPIDURAL; INFILTRATION; INTRACAUDAL; PERINEURAL
Status: COMPLETED | OUTPATIENT
Start: 2024-02-07 | End: 2024-02-07

## 2024-02-07 RX ADMIN — BETAMETHASONE SODIUM PHOSPHATE AND BETAMETHASONE ACETATE 6 MG: 3; 3 INJECTION, SUSPENSION INTRA-ARTICULAR; INTRALESIONAL; INTRAMUSCULAR; SOFT TISSUE at 10:45

## 2024-02-07 RX ADMIN — LIDOCAINE HYDROCHLORIDE 7 ML: 10 INJECTION, SOLUTION EPIDURAL; INFILTRATION; INTRACAUDAL; PERINEURAL at 10:45

## 2024-02-07 NOTE — PROGRESS NOTES
Assessment:     1. Primary osteoarthritis of both knees        Plan:     Problem List Items Addressed This Visit          Musculoskeletal and Integument    Primary osteoarthritis of both knees - Primary     Findings consistent with severe bilateral knee osteoarthritis. Patient was given bilateral knee cortisone injections, tolerated well, cold compress today. Can repeat CSI in 3-4 months if needed. If no significant relief then she can call in 6 weeks and gel injections can be ordered. Stationary bike, elliptical, walking for low impact exercise. Tylenol, topical creams for pain control.  Follow-up as needed if symptoms return.  All patient's questions were answered to her satisfaction.  This note is created using dictation transcription.  It may contain typographical errors, grammatical errors, improperly dictated words, background noise and other errors.         Relevant Medications    betamethasone acetate-betamethasone sodium phosphate (CELESTONE) injection 6 mg (Completed)    betamethasone acetate-betamethasone sodium phosphate (CELESTONE) injection 6 mg (Completed)    lidocaine (PF) (XYLOCAINE-MPF) 1 % injection 7 mL (Completed)    lidocaine (PF) (XYLOCAINE-MPF) 1 % injection 7 mL (Completed)    Other Relevant Orders    Large joint arthrocentesis: bilateral knee (Completed)       Subjective:     Patient ID: Amanda Gonzalez is a 88 y.o. female.  Chief Complaint:  88-year-old female presents to office today for follow-up of bilateral knee osteoarthritis.  The patient was last seen in office on August 1, 2023.  She arrives in a wheelchair today.  She currently resides in a nursing home.  She states that her bilateral knee pain has returned and she would like repeat cortisone injections today.  She denies any new injury.    Allergy:  Allergies   Allergen Reactions    Tramadol Headache     Medications:  all current active meds have been reviewed  Past Medical History:  Past Medical History:   Diagnosis Date     "Allergic rhinitis     Arthritis     Back pain     Colon polyps     GERD (gastroesophageal reflux disease)     Hyperlipidemia     Hypertension      Past Surgical History:  Past Surgical History:   Procedure Laterality Date    DILATION AND CURETTAGE OF UTERUS      TONSILLECTOMY       Family History:  Family History   Problem Relation Age of Onset    Breast cancer Sister     Cancer Brother      Social History:  Social History     Substance and Sexual Activity   Alcohol Use Yes    Alcohol/week: 5.0 standard drinks of alcohol    Types: 5 Shots of liquor per week    Comment: patient states she drinks out of the bottle daily      Social History     Substance and Sexual Activity   Drug Use No     Social History     Tobacco Use   Smoking Status Former   Smokeless Tobacco Never   Tobacco Comments    quit 50 years ago     Review of Systems   Constitutional:  Negative for chills and fever.   HENT:  Negative for ear pain and sore throat.    Eyes:  Negative for pain and visual disturbance.   Respiratory:  Negative for cough and shortness of breath.    Cardiovascular:  Negative for chest pain and palpitations.   Gastrointestinal:  Negative for abdominal pain and vomiting.   Genitourinary:  Negative for dysuria and hematuria.   Musculoskeletal:  Positive for arthralgias (bilateral knee) and gait problem (in a wheelchair). Negative for back pain.   Skin:  Negative for color change and rash.   Neurological:  Negative for seizures and syncope.   Psychiatric/Behavioral: Negative.     All other systems reviewed and are negative.        Objective:  BP Readings from Last 1 Encounters:   02/07/24 132/80      Wt Readings from Last 1 Encounters:   02/07/24 56.7 kg (125 lb)      BMI:   Estimated body mass index is 20.8 kg/m² as calculated from the following:    Height as of this encounter: 5' 5\" (1.651 m).    Weight as of this encounter: 56.7 kg (125 lb).  BSA:   Estimated body surface area is 1.62 meters squared as calculated from the " "following:    Height as of this encounter: 5' 5\" (1.651 m).    Weight as of this encounter: 56.7 kg (125 lb).   Physical Exam  Vitals and nursing note reviewed.   Constitutional:       Appearance: Normal appearance. She is well-developed.   HENT:      Head: Normocephalic and atraumatic.      Right Ear: External ear normal.      Left Ear: External ear normal.   Eyes:      Extraocular Movements: Extraocular movements intact.      Conjunctiva/sclera: Conjunctivae normal.   Pulmonary:      Effort: Pulmonary effort is normal.   Musculoskeletal:      Cervical back: Neck supple.      Right knee: No effusion.      Left knee: No effusion.   Skin:     General: Skin is warm and dry.   Neurological:      Mental Status: She is alert and oriented to person, place, and time.      Deep Tendon Reflexes: Reflexes are normal and symmetric.   Psychiatric:         Mood and Affect: Mood normal.         Behavior: Behavior normal.       Right Knee Exam     Muscle Strength   The patient has normal right knee strength.    Tenderness   The patient is experiencing tenderness in the medial joint line and lateral joint line.    Range of Motion   Extension:  -5   Flexion:  120     Tests   Varus: negative Valgus: negative  Patellar apprehension: negative    Other   Erythema: absent  Scars: absent  Sensation: normal  Pulse: present  Swelling: none  Effusion: no effusion present    Comments:  Crepitation with motion of patella       Left Knee Exam     Muscle Strength   The patient has normal left knee strength.    Tenderness   The patient is experiencing tenderness in the lateral joint line and medial joint line.    Range of Motion   Extension:  -5   Flexion:  120     Tests   Varus: negative Valgus: negative  Patellar apprehension: negative    Other   Erythema: absent  Scars: absent  Sensation: normal  Pulse: present  Swelling: none  Effusion: no effusion present    Comments:  Crepitation with motion of patella             no new imaging to review   "     Large joint arthrocentesis: bilateral knee  Universal Protocol:  Consent: Verbal consent obtained.  Risks and benefits: risks, benefits and alternatives were discussed  Consent given by: patient  Patient understanding: patient states understanding of the procedure being performed  Site marked: the operative site was marked  Supporting Documentation  Indications: pain   Procedure Details  Location: knee - bilateral knee  Preparation: Patient was prepped and draped in the usual sterile fashion  Needle size: 22 G  Ultrasound guidance: no  Approach: anterolateral    Medications (Right): 6 mg betamethasone acetate-betamethasone sodium phosphate 6 (3-3) mg/mL; 7 mL lidocaine (PF) 1 %Medications (Left): 6 mg betamethasone acetate-betamethasone sodium phosphate 6 (3-3) mg/mL; 7 mL lidocaine (PF) 1 %   Patient tolerance: patient tolerated the procedure well with no immediate complications  Dressing:  Sterile dressing applied        Scribe Attestation      I,:  Ana Martinez PA-C am acting as a scribe while in the presence of the attending physician.:       I,:  Adry Salazar MD personally performed the services described in this documentation    as scribed in my presence.:

## 2024-02-07 NOTE — ASSESSMENT & PLAN NOTE
Findings consistent with severe bilateral knee osteoarthritis. Patient was given bilateral knee cortisone injections, tolerated well, cold compress today. Can repeat CSI in 3-4 months if needed. If no significant relief then she can call in 6 weeks and gel injections can be ordered. Stationary bike, elliptical, walking for low impact exercise. Tylenol, topical creams for pain control.  Follow-up as needed if symptoms return.  All patient's questions were answered to her satisfaction.  This note is created using dictation transcription.  It may contain typographical errors, grammatical errors, improperly dictated words, background noise and other errors.

## 2024-02-09 ENCOUNTER — TELEPHONE (OUTPATIENT)
Age: 89
End: 2024-02-09

## 2024-02-09 NOTE — TELEPHONE ENCOUNTER
Patient is now in [place] [address] [address], [place] [address] [phone]    Called and spoke with nurse that states that they set transportation up for residents gave practice name and address and she said that she would set it up for patients up coming appt there is nothing for us to do.

## 2024-02-09 NOTE — TELEPHONE ENCOUNTER
Caller: duran Patel    Doctor: Yaa    Reason for call: pt scheduled for procedure on 2/12/24, pt needs transportation to and from appt. Please Advise    Call back#: [phone]

## 2024-02-09 NOTE — TELEPHONE ENCOUNTER
Caller: patient    Doctor: brain    Reason for call: stated missed a call  For transportation    Call back#:

## 2024-02-12 ENCOUNTER — HOSPITAL ENCOUNTER (OUTPATIENT)
Dept: RADIOLOGY | Facility: CLINIC | Age: 89
Discharge: HOME | End: 2024-02-12
Admitting: ANESTHESIOLOGY
Payer: MEDICARE

## 2024-02-12 VITALS
HEART RATE: 62 BPM | TEMPERATURE: 97.5 F | DIASTOLIC BLOOD PRESSURE: 68 MMHG | SYSTOLIC BLOOD PRESSURE: 163 MMHG | OXYGEN SATURATION: 93 % | RESPIRATION RATE: 18 BRPM

## 2024-02-12 DIAGNOSIS — G89.29 HIP PAIN, CHRONIC, LEFT: ICD-10-CM

## 2024-02-12 DIAGNOSIS — M25.552 HIP PAIN, CHRONIC, LEFT: ICD-10-CM

## 2024-02-12 DIAGNOSIS — M16.12 PRIMARY OSTEOARTHRITIS OF LEFT HIP: ICD-10-CM

## 2024-02-12 PROCEDURE — 20610 DRAIN/INJ JOINT/BURSA W/O US: CPT | Performed by: ANESTHESIOLOGY

## 2024-02-12 PROCEDURE — 77002 NEEDLE LOCALIZATION BY XRAY: CPT | Performed by: ANESTHESIOLOGY

## 2024-02-12 RX ORDER — ROPIVACAINE HYDROCHLORIDE 2 MG/ML
2 INJECTION, SOLUTION EPIDURAL; INFILTRATION; PERINEURAL ONCE
Status: COMPLETED | OUTPATIENT
Start: 2024-02-12 | End: 2024-02-12

## 2024-02-12 RX ORDER — METHYLPREDNISOLONE ACETATE 80 MG/ML
80 INJECTION, SUSPENSION INTRA-ARTICULAR; INTRALESIONAL; INTRAMUSCULAR; PARENTERAL; SOFT TISSUE ONCE
Status: COMPLETED | OUTPATIENT
Start: 2024-02-12 | End: 2024-02-12

## 2024-02-12 RX ADMIN — ROPIVACAINE HYDROCHLORIDE 2 ML: 2 INJECTION, SOLUTION EPIDURAL; INFILTRATION at 10:06

## 2024-02-12 RX ADMIN — IOHEXOL 1 ML: 300 INJECTION, SOLUTION INTRAVENOUS at 10:06

## 2024-02-12 RX ADMIN — METHYLPREDNISOLONE ACETATE 80 MG: 80 INJECTION, SUSPENSION INTRA-ARTICULAR; INTRALESIONAL; INTRAMUSCULAR; SOFT TISSUE at 10:06

## 2024-02-12 NOTE — DISCHARGE INSTRUCTIONS

## 2024-02-12 NOTE — H&P
History of Present Illness: The patient is a 88 y.o. female who presents with complaints of pain.    Past Medical History:   Diagnosis Date    Allergic rhinitis     Arthritis     Back pain     Colon polyps     GERD (gastroesophageal reflux disease)     Hyperlipidemia     Hypertension        Past Surgical History:   Procedure Laterality Date    DILATION AND CURETTAGE OF UTERUS      TONSILLECTOMY           Current Outpatient Medications:     acetaminophen (TYLENOL) 500 mg tablet, Take 500 mg by mouth every 8 (eight) hours as needed for mild pain, Disp: , Rfl:     acetaminophen (TYLENOL) 650 mg suppository, Insert 650 mg into the rectum every 4 (four) hours as needed for mild pain For temp > 100.4, Disp: , Rfl:     apixaban (ELIQUIS) 2.5 mg, Take 1 tablet (2.5 mg total) by mouth 2 (two) times a day, Disp: 60 tablet, Rfl: 0    aspirin 325 mg tablet, Take by mouth , Disp: , Rfl:     Diapers & Supplies MISC, USE ONE, CHANGE EVERY 4 HOURS AS NEEDED for urine incontinence size large, Disp: 100 each, Rfl: 5    Diclofenac Sodium (VOLTAREN) 1 %, Apply 2 g topically 4 (four) times a day for 19 days (Patient not taking: Reported on 1/16/2024), Disp: 150 g, Rfl: 0    folic acid (FOLVITE) 1 mg tablet, Take 1 tablet (1 mg total) by mouth daily, Disp: 30 tablet, Rfl: 0    lidocaine (LIDODERM) 5 %, Apply 1 patch topically over 12 hours daily Remove & Discard patch within 12 hours or as directed by MD, Disp: 30 patch, Rfl: 0    lisinopril (ZESTRIL) 5 mg tablet, Take 5 mg by mouth daily, Disp: , Rfl:     metoprolol tartrate (LOPRESSOR) 25 mg tablet, Take 0.5 tablets (12.5 mg total) by mouth every 12 (twelve) hours, Disp: 30 tablet, Rfl: 0    Sanitary Napkins & Tampons (KOTEX MAXI OVERNITE) PADS, USE ONE, CHANGE EVERY 4 HOURS AS NEEDED for urine incontinence, Disp: 100 each, Rfl: 5    thiamine 100 MG tablet, Take 1 tablet (100 mg total) by mouth daily, Disp: 30 tablet, Rfl: 0    Current Facility-Administered Medications:     iohexol  (OMNIPAQUE) 300 mg/mL injection 1 mL, 1 mL, Intra-articular, Once, Cody Mon DO    methylPREDNISolone acetate (DEPO-MEDROL) injection 80 mg, 80 mg, Intra-articular, Once, Cody Mon DO    ropivacaine (NAROPIN) injection 2 mL, 2 mL, Intra-articular, Once, Cody Mon DO    Allergies   Allergen Reactions    Tramadol Headache       Physical Exam:   General: Awake, Alert, Oriented x 3, Mood and affect appropriate  Respiratory: Respirations even and unlabored  Cardiovascular: Peripheral pulses intact; no edema  Musculoskeletal Exam: Decreased range of motion left hip, in wheelchair    ASA Score: II         Assessment:   1. Hip pain, chronic, left    2. Primary osteoarthritis of left hip        Plan: LEFT HIP INJ [id number]

## 2024-02-13 PROBLEM — N30.00 ACUTE CYSTITIS WITHOUT HEMATURIA: Status: RESOLVED | Noted: 2023-12-15 | Resolved: 2024-02-13

## 2024-02-19 ENCOUNTER — TELEPHONE (OUTPATIENT)
Dept: PAIN MEDICINE | Facility: CLINIC | Age: 89
End: 2024-02-19

## 2024-05-23 ENCOUNTER — APPOINTMENT (EMERGENCY)
Dept: CT IMAGING | Facility: HOSPITAL | Age: 89
DRG: 378 | End: 2024-05-23
Payer: COMMERCIAL

## 2024-05-23 ENCOUNTER — APPOINTMENT (EMERGENCY)
Dept: RADIOLOGY | Facility: HOSPITAL | Age: 89
DRG: 378 | End: 2024-05-23
Payer: COMMERCIAL

## 2024-05-23 ENCOUNTER — HOSPITAL ENCOUNTER (INPATIENT)
Facility: HOSPITAL | Age: 89
LOS: 6 days | Discharge: NURSING, REHAB OR LONG-TERM CARE | DRG: 378 | End: 2024-05-29
Attending: EMERGENCY MEDICINE | Admitting: INTERNAL MEDICINE
Payer: COMMERCIAL

## 2024-05-23 DIAGNOSIS — I48.91 ATRIAL FIBRILLATION WITH RAPID VENTRICULAR RESPONSE (HCC): ICD-10-CM

## 2024-05-23 DIAGNOSIS — D64.9 ANEMIA: Primary | ICD-10-CM

## 2024-05-23 DIAGNOSIS — N39.0 UTI (URINARY TRACT INFECTION): ICD-10-CM

## 2024-05-23 DIAGNOSIS — Z75.8 OTHER PROBLEMS RELATED TO MEDICAL FACILITIES AND OTHER HEALTH CARE: ICD-10-CM

## 2024-05-23 DIAGNOSIS — K21.9 GASTROESOPHAGEAL REFLUX DISEASE, UNSPECIFIED WHETHER ESOPHAGITIS PRESENT: ICD-10-CM

## 2024-05-23 PROBLEM — N30.90 CYSTITIS: Status: ACTIVE | Noted: 2024-05-23
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
## 2024-05-23 PROBLEM — E87.1 HYPONATREMIA: Status: ACTIVE | Noted: 2024-05-23

## 2024-05-23 PROBLEM — R94.31 ABNORMAL EKG: Status: ACTIVE | Noted: 2024-05-23

## 2024-05-23 PROBLEM — R93.2 ABNORMAL CT SCAN, GALLBLADDER: Status: ACTIVE | Noted: 2024-05-23

## 2024-05-23 LAB
ABO GROUP BLD: NORMAL
ABO GROUP BLD: NORMAL
ALBUMIN SERPL BCP-MCNC: 3.8 G/DL (ref 3.5–5)
ALP SERPL-CCNC: 38 U/L (ref 34–104)
ALT SERPL W P-5'-P-CCNC: 7 U/L (ref 7–52)
ANION GAP SERPL CALCULATED.3IONS-SCNC: 9 MMOL/L (ref 4–13)
APTT PPP: 27 SECONDS (ref 23–37)
AST SERPL W P-5'-P-CCNC: 14 U/L (ref 13–39)
ATRIAL RATE: 182 BPM
ATRIAL RATE: 59 BPM
BACTERIA UR QL AUTO: ABNORMAL /HPF
BASOPHILS # BLD AUTO: 0.02 THOUSANDS/ÂΜL (ref 0–0.1)
BASOPHILS NFR BLD AUTO: 0 % (ref 0–1)
BILIRUB SERPL-MCNC: 0.52 MG/DL (ref 0.2–1)
BILIRUB UR QL STRIP: NEGATIVE
BLD GP AB SCN SERPL QL: NEGATIVE
BUN SERPL-MCNC: 40 MG/DL (ref 5–25)
CALCIUM SERPL-MCNC: 9.3 MG/DL (ref 8.4–10.2)
CHLORIDE SERPL-SCNC: 103 MMOL/L (ref 96–108)
CLARITY UR: ABNORMAL
CO2 SERPL-SCNC: 20 MMOL/L (ref 21–32)
COLOR UR: YELLOW
CREAT SERPL-MCNC: 1.07 MG/DL (ref 0.6–1.3)
EOSINOPHIL # BLD AUTO: 0.05 THOUSAND/ÂΜL (ref 0–0.61)
EOSINOPHIL NFR BLD AUTO: 1 % (ref 0–6)
ERYTHROCYTE [DISTWIDTH] IN BLOOD BY AUTOMATED COUNT: 16 % (ref 11.6–15.1)
FLUAV RNA RESP QL NAA+PROBE: NEGATIVE
FLUBV RNA RESP QL NAA+PROBE: NEGATIVE
GFR SERPL CREATININE-BSD FRML MDRD: 46 ML/MIN/1.73SQ M
GLUCOSE SERPL-MCNC: 109 MG/DL (ref 65–140)
GLUCOSE UR STRIP-MCNC: NEGATIVE MG/DL
HCT VFR BLD AUTO: 12.7 % (ref 34.8–46.1)
HGB BLD-MCNC: 3.3 G/DL (ref 11.5–15.4)
HGB UR QL STRIP.AUTO: NEGATIVE
IMM GRANULOCYTES # BLD AUTO: 0.03 THOUSAND/UL (ref 0–0.2)
IMM GRANULOCYTES NFR BLD AUTO: 1 % (ref 0–2)
INR PPP: 1.36 (ref 0.84–1.19)
KETONES UR STRIP-MCNC: ABNORMAL MG/DL
LACTATE SERPL-SCNC: 1.5 MMOL/L (ref 0.5–2)
LEUKOCYTE ESTERASE UR QL STRIP: ABNORMAL
LYMPHOCYTES # BLD AUTO: 1.02 THOUSANDS/ÂΜL (ref 0.6–4.47)
LYMPHOCYTES NFR BLD AUTO: 22 % (ref 14–44)
MCH RBC QN AUTO: 17.4 PG (ref 26.8–34.3)
MCHC RBC AUTO-ENTMCNC: 26 G/DL (ref 31.4–37.4)
MCV RBC AUTO: 67 FL (ref 82–98)
MONOCYTES # BLD AUTO: 0.45 THOUSAND/ÂΜL (ref 0.17–1.22)
MONOCYTES NFR BLD AUTO: 10 % (ref 4–12)
NEUTROPHILS # BLD AUTO: 3 THOUSANDS/ÂΜL (ref 1.85–7.62)
NEUTS SEG NFR BLD AUTO: 66 % (ref 43–75)
NITRITE UR QL STRIP: NEGATIVE
NON-SQ EPI CELLS URNS QL MICRO: ABNORMAL /HPF
NRBC BLD AUTO-RTO: 4 /100 WBCS
PH UR STRIP.AUTO: 6.5 [PH]
PLATELET # BLD AUTO: 398 THOUSANDS/UL (ref 149–390)
PMV BLD AUTO: 10 FL (ref 8.9–12.7)
POTASSIUM SERPL-SCNC: 4.2 MMOL/L (ref 3.5–5.3)
PROCALCITONIN SERPL-MCNC: 0.12 NG/ML
PROT SERPL-MCNC: 6.1 G/DL (ref 6.4–8.4)
PROT UR STRIP-MCNC: ABNORMAL MG/DL
PROTHROMBIN TIME: 17.2 SECONDS (ref 11.6–14.5)
QRS AXIS: 49 DEGREES
QRS AXIS: 64 DEGREES
QRSD INTERVAL: 82 MS
QRSD INTERVAL: 86 MS
QT INTERVAL: 272 MS
QT INTERVAL: 316 MS
QTC INTERVAL: 419 MS
QTC INTERVAL: 437 MS
RBC # BLD AUTO: 1.9 MILLION/UL (ref 3.81–5.12)
RBC #/AREA URNS AUTO: ABNORMAL /HPF
RH BLD: POSITIVE
RH BLD: POSITIVE
RSV RNA RESP QL NAA+PROBE: NEGATIVE
SARS-COV-2 RNA RESP QL NAA+PROBE: NEGATIVE
SODIUM SERPL-SCNC: 132 MMOL/L (ref 135–147)
SP GR UR STRIP.AUTO: 1.02 (ref 1–1.03)
SPECIMEN EXPIRATION DATE: NORMAL
T WAVE AXIS: 220 DEGREES
T WAVE AXIS: 233 DEGREES
UROBILINOGEN UR STRIP-ACNC: <2 MG/DL
VENTRICULAR RATE: 106 BPM
VENTRICULAR RATE: 155 BPM
WBC # BLD AUTO: 4.57 THOUSAND/UL (ref 4.31–10.16)
WBC #/AREA URNS AUTO: ABNORMAL /HPF

## 2024-05-23 PROCEDURE — 93010 ELECTROCARDIOGRAM REPORT: CPT | Performed by: INTERNAL MEDICINE

## 2024-05-23 PROCEDURE — 82746 ASSAY OF FOLIC ACID SERUM: CPT | Performed by: PHYSICIAN ASSISTANT

## 2024-05-23 PROCEDURE — 80053 COMPREHEN METABOLIC PANEL: CPT | Performed by: EMERGENCY MEDICINE

## 2024-05-23 PROCEDURE — 87040 BLOOD CULTURE FOR BACTERIA: CPT | Performed by: EMERGENCY MEDICINE

## 2024-05-23 PROCEDURE — 81001 URINALYSIS AUTO W/SCOPE: CPT | Performed by: EMERGENCY MEDICINE

## 2024-05-23 PROCEDURE — 85730 THROMBOPLASTIN TIME PARTIAL: CPT | Performed by: EMERGENCY MEDICINE

## 2024-05-23 PROCEDURE — 71045 X-RAY EXAM CHEST 1 VIEW: CPT

## 2024-05-23 PROCEDURE — 84145 PROCALCITONIN (PCT): CPT | Performed by: EMERGENCY MEDICINE

## 2024-05-23 PROCEDURE — 83605 ASSAY OF LACTIC ACID: CPT | Performed by: EMERGENCY MEDICINE

## 2024-05-23 PROCEDURE — 99291 CRITICAL CARE FIRST HOUR: CPT | Performed by: EMERGENCY MEDICINE

## 2024-05-23 PROCEDURE — 93005 ELECTROCARDIOGRAM TRACING: CPT

## 2024-05-23 PROCEDURE — 36415 COLL VENOUS BLD VENIPUNCTURE: CPT | Performed by: EMERGENCY MEDICINE

## 2024-05-23 PROCEDURE — 86850 RBC ANTIBODY SCREEN: CPT | Performed by: EMERGENCY MEDICINE

## 2024-05-23 PROCEDURE — 0241U HB NFCT DS VIR RESP RNA 4 TRGT: CPT | Performed by: EMERGENCY MEDICINE

## 2024-05-23 PROCEDURE — C9113 INJ PANTOPRAZOLE SODIUM, VIA: HCPCS | Performed by: PHYSICIAN ASSISTANT

## 2024-05-23 PROCEDURE — 36430 TRANSFUSION BLD/BLD COMPNT: CPT

## 2024-05-23 PROCEDURE — 86920 COMPATIBILITY TEST SPIN: CPT

## 2024-05-23 PROCEDURE — 86901 BLOOD TYPING SEROLOGIC RH(D): CPT | Performed by: EMERGENCY MEDICINE

## 2024-05-23 PROCEDURE — 86900 BLOOD TYPING SEROLOGIC ABO: CPT | Performed by: EMERGENCY MEDICINE

## 2024-05-23 PROCEDURE — 99223 1ST HOSP IP/OBS HIGH 75: CPT | Performed by: PHYSICIAN ASSISTANT

## 2024-05-23 PROCEDURE — 83550 IRON BINDING TEST: CPT | Performed by: PHYSICIAN ASSISTANT

## 2024-05-23 PROCEDURE — 99284 EMERGENCY DEPT VISIT MOD MDM: CPT

## 2024-05-23 PROCEDURE — 82728 ASSAY OF FERRITIN: CPT | Performed by: PHYSICIAN ASSISTANT

## 2024-05-23 PROCEDURE — 87086 URINE CULTURE/COLONY COUNT: CPT | Performed by: EMERGENCY MEDICINE

## 2024-05-23 PROCEDURE — 85025 COMPLETE CBC W/AUTO DIFF WBC: CPT | Performed by: EMERGENCY MEDICINE

## 2024-05-23 PROCEDURE — 83540 ASSAY OF IRON: CPT | Performed by: PHYSICIAN ASSISTANT

## 2024-05-23 PROCEDURE — 71250 CT THORAX DX C-: CPT

## 2024-05-23 PROCEDURE — 85610 PROTHROMBIN TIME: CPT | Performed by: EMERGENCY MEDICINE

## 2024-05-23 PROCEDURE — 74176 CT ABD & PELVIS W/O CONTRAST: CPT

## 2024-05-23 PROCEDURE — 86923 COMPATIBILITY TEST ELECTRIC: CPT

## 2024-05-23 PROCEDURE — 82607 VITAMIN B-12: CPT | Performed by: PHYSICIAN ASSISTANT

## 2024-05-23 PROCEDURE — 70450 CT HEAD/BRAIN W/O DYE: CPT

## 2024-05-23 PROCEDURE — 96374 THER/PROPH/DIAG INJ IV PUSH: CPT

## 2024-05-23 PROCEDURE — 96361 HYDRATE IV INFUSION ADD-ON: CPT

## 2024-05-23 PROCEDURE — P9040 RBC LEUKOREDUCED IRRADIATED: HCPCS

## 2024-05-23 RX ORDER — CEFTRIAXONE 1 G/50ML
1000 INJECTION, SOLUTION INTRAVENOUS ONCE
Status: COMPLETED | OUTPATIENT
Start: 2024-05-23 | End: 2024-05-23

## 2024-05-23 RX ORDER — ONDANSETRON 2 MG/ML
4 INJECTION INTRAMUSCULAR; INTRAVENOUS EVERY 6 HOURS PRN
Status: DISCONTINUED | OUTPATIENT
Start: 2024-05-23 | End: 2024-05-29 | Stop reason: HOSPADM

## 2024-05-23 RX ORDER — CEFTRIAXONE 1 G/50ML
1000 INJECTION, SOLUTION INTRAVENOUS ONCE
Status: DISCONTINUED | OUTPATIENT
Start: 2024-05-23 | End: 2024-05-23

## 2024-05-23 RX ORDER — CHOLECALCIFEROL (VITAMIN D3) 125 MCG
2000 CAPSULE ORAL DAILY
COMMUNITY

## 2024-05-23 RX ORDER — METOPROLOL TARTRATE 1 MG/ML
5 INJECTION, SOLUTION INTRAVENOUS ONCE
Status: COMPLETED | OUTPATIENT
Start: 2024-05-23 | End: 2024-05-23

## 2024-05-23 RX ORDER — ACETAMINOPHEN 325 MG/1
650 TABLET ORAL EVERY 6 HOURS PRN
Status: DISCONTINUED | OUTPATIENT
Start: 2024-05-23 | End: 2024-05-29 | Stop reason: HOSPADM

## 2024-05-23 RX ORDER — CEFTRIAXONE 1 G/50ML
1000 INJECTION, SOLUTION INTRAVENOUS EVERY 24 HOURS
Status: DISCONTINUED | OUTPATIENT
Start: 2024-05-24 | End: 2024-05-24

## 2024-05-23 RX ORDER — METOPROLOL TARTRATE 1 MG/ML
2.5 INJECTION, SOLUTION INTRAVENOUS EVERY 6 HOURS PRN
Status: DISCONTINUED | OUTPATIENT
Start: 2024-05-23 | End: 2024-05-29 | Stop reason: HOSPADM

## 2024-05-23 RX ORDER — PANTOPRAZOLE SODIUM 40 MG/10ML
40 INJECTION, POWDER, LYOPHILIZED, FOR SOLUTION INTRAVENOUS EVERY 12 HOURS SCHEDULED
Status: DISCONTINUED | OUTPATIENT
Start: 2024-05-23 | End: 2024-05-26

## 2024-05-23 RX ADMIN — PANTOPRAZOLE SODIUM 40 MG: 40 INJECTION, POWDER, FOR SOLUTION INTRAVENOUS at 23:15

## 2024-05-23 RX ADMIN — SODIUM CHLORIDE 1000 ML: 0.9 INJECTION, SOLUTION INTRAVENOUS at 17:02

## 2024-05-23 RX ADMIN — CEFTRIAXONE 1000 MG: 1 INJECTION, SOLUTION INTRAVENOUS at 19:52

## 2024-05-23 RX ADMIN — METOPROLOL TARTRATE 5 MG: 5 INJECTION INTRAVENOUS at 18:41

## 2024-05-23 NOTE — ED PROVIDER NOTES
History  Chief Complaint   Patient presents with    Medical Problem     Pt to er via ems from [facility] with reports from the patient that she fell out of a faulty wheelchair 5 or more weeks ago, and no one at the facility checked on her. Staff states that they activated 911 today because the patient was requesting to be seen in the er. Primary nurse at facility states that patient is at her baseline      88-year-old female presents with altered mental status.  She says she fell out of a wheelchair 5 weeks ago and no one has checked on her since per report by EMS suggest otherwise.  Patient states she is non-compliant with her meds including anticoagulation.      Medical Problem      Prior to Admission Medications   Prescriptions Last Dose Informant Patient Reported? Taking?   Diapers & Supplies MISC  Outside Facility (Specify) No No   Sig: USE ONE, CHANGE EVERY 4 HOURS AS NEEDED for urine incontinence size large   Diclofenac Sodium (VOLTAREN) 1 %   No No   Sig: Apply 2 g topically 4 (four) times a day for 19 days   Patient not taking: Reported on 1/16/2024   Sanitary Napkins & Tampons (KOTEX MAXI OVERNITE) PADS  Outside Facility (Specify) No No   Sig: USE ONE, CHANGE EVERY 4 HOURS AS NEEDED for urine incontinence   acetaminophen (TYLENOL) 500 mg tablet  Outside Facility (Specify) Yes No   Sig: Take 500 mg by mouth every 8 (eight) hours as needed for mild pain   acetaminophen (TYLENOL) 650 mg suppository  Outside Facility (Specify) Yes No   Sig: Insert 650 mg into the rectum every 4 (four) hours as needed for mild pain For temp > 100.4   apixaban (ELIQUIS) 2.5 mg  Outside Facility (Specify) No No   Sig: Take 1 tablet (2.5 mg total) by mouth 2 (two) times a day   aspirin 325 mg tablet  Outside Facility (Specify) Yes No   Sig: Take by mouth    folic acid (FOLVITE) 1 mg tablet  Outside Facility (Specify) No No   Sig: Take 1 tablet (1 mg total) by mouth daily   lidocaine (LIDODERM) 5 %  Outside Facility (Specify) No  No   Sig: Apply 1 patch topically over 12 hours daily Remove & Discard patch within 12 hours or as directed by MD   lisinopril (ZESTRIL) 5 mg tablet  Outside Facility (Specify) Yes No   Sig: Take 5 mg by mouth daily   metoprolol tartrate (LOPRESSOR) 25 mg tablet  Outside Facility (Specify) No No   Sig: Take 0.5 tablets (12.5 mg total) by mouth every 12 (twelve) hours   thiamine 100 MG tablet  Outside Facility (Specify) No No   Sig: Take 1 tablet (100 mg total) by mouth daily      Facility-Administered Medications: None       Past Medical History:   Diagnosis Date    Allergic rhinitis     Arthritis     Back pain     Colon polyps     GERD (gastroesophageal reflux disease)     Hyperlipidemia     Hypertension        Past Surgical History:   Procedure Laterality Date    DILATION AND CURETTAGE OF UTERUS      TONSILLECTOMY         Family History   Problem Relation Age of Onset    Breast cancer Sister     Cancer Brother      I have reviewed and agree with the history as documented.    E-Cigarette/Vaping     E-Cigarette/Vaping Substances     Social History     Tobacco Use    Smoking status: Former    Smokeless tobacco: Never    Tobacco comments:     quit 50 years ago   Substance Use Topics    Alcohol use: Yes     Alcohol/week: 5.0 standard drinks of alcohol     Types: 5 Shots of liquor per week     Comment: patient states she drinks out of the bottle daily     Drug use: No       Review of Systems    Physical Exam  Physical Exam  Vitals and nursing note reviewed.   Constitutional:       Appearance: She is normal weight.      Comments: Frail-appearing   HENT:      Head: Normocephalic and atraumatic.   Eyes:      Conjunctiva/sclera: Conjunctivae normal.      Pupils: Pupils are equal, round, and reactive to light.   Cardiovascular:      Rate and Rhythm: Tachycardia present. Rhythm irregular.   Pulmonary:      Effort: Pulmonary effort is normal. No respiratory distress.   Abdominal:      General: Abdomen is flat. There is no  distension.   Musculoskeletal:         General: No swelling or deformity.      Cervical back: Normal range of motion and neck supple.   Skin:     General: Skin is dry.      Coloration: Skin is not jaundiced.   Neurological:      General: No focal deficit present.      Mental Status: She is alert and oriented to person, place, and time.   Psychiatric:         Mood and Affect: Mood normal. Affect is angry.         Behavior: Behavior is agitated.         Thought Content: Thought content normal.         Vital Signs  ED Triage Vitals   Temperature Pulse Respirations Blood Pressure SpO2   05/23/24 1722 05/23/24 1645 05/23/24 1645 05/23/24 1645 05/23/24 1645   97.8 °F (36.6 °C) (!) 152 18 99/60 95 %      Temp Source Heart Rate Source Patient Position - Orthostatic VS BP Location FiO2 (%)   05/23/24 1722 05/23/24 1645 05/23/24 1645 05/23/24 1645 --   Oral Monitor Lying Right arm       Pain Score       05/23/24 1930       No Pain           Vitals:    05/23/24 1915 05/23/24 1930 05/23/24 1945 05/23/24 2000   BP: 105/67 112/70 131/59 134/71   Pulse: (!) 107 (!) 118 (!) 116 104   Patient Position - Orthostatic VS: Lying Lying Lying          Visual Acuity      ED Medications  Medications   cefTRIAXone (ROCEPHIN) IVPB (premix in dextrose) 1,000 mg 50 mL (1,000 mg Intravenous New Bag 5/23/24 1952)   sodium chloride 0.9 % bolus 1,000 mL (0 mL Intravenous Stopped 5/23/24 1802)   metoprolol (LOPRESSOR) injection 5 mg (5 mg Intravenous Given 5/23/24 1841)       Diagnostic Studies  Results Reviewed       Procedure Component Value Units Date/Time    Urine Microscopic [[id number]]  (Abnormal) Collected: 05/23/24 1905    Lab Status: Final result Specimen: Urine, Other Updated: 05/23/24 2001     RBC, UA 0-1 /hpf      WBC, UA 10-20 /hpf      Epithelial Cells Occasional /hpf      Bacteria, UA Innumerable /hpf     Narrative:      Microscopic performed by Tianna Carreno.     Urine culture [[id number]] Collected: 05/23/24 1905    Lab Status: In  process Specimen: Urine, Other Updated: 05/23/24 2001    UA w Reflex to Microscopic w Reflex to Culture [[id number]]  (Abnormal) Collected: 05/23/24 1905    Lab Status: Final result Specimen: Urine, Other Updated: 05/23/24 1917     Color, UA Yellow     Clarity, UA Slightly Cloudy     Specific Gravity, UA 1.020     pH, UA 6.5     Leukocytes, UA Moderate     Nitrite, UA Negative     Protein, UA 30 (1+) mg/dl      Glucose, UA Negative mg/dl      Ketones, UA Trace mg/dl      Urobilinogen, UA <2.0 mg/dl      Bilirubin, UA Negative     Occult Blood, UA Negative    Procalcitonin [[id number]]  (Normal) Collected: 05/23/24 1657    Lab Status: Final result Specimen: Blood from Arm, Left Updated: 05/23/24 1810     Procalcitonin 0.12 ng/ml     FLU/RSV/COVID - if FLU/RSV clinically relevant [[id number]]  (Normal) Collected: 05/23/24 1657    Lab Status: Final result Specimen: Nares from Nose Updated: 05/23/24 1745     SARS-CoV-2 Negative     INFLUENZA A PCR Negative     INFLUENZA B PCR Negative     RSV PCR Negative    Narrative:      FOR PEDIATRIC PATIENTS - copy/paste COVID Guidelines URL to browser: [email or web address]    SARS-CoV-2 assay is a Nucleic Acid Amplification assay intended for the  qualitative detection of nucleic acid from SARS-CoV-2 in nasopharyngeal  swabs. Results are for the presumptive identification of SARS-CoV-2 RNA.    Positive results are indicative of infection with SARS-CoV-2, the virus  causing COVID-19, but do not rule out bacterial infection or co-infection  with other viruses. Laboratories within the [place] and its  territories are required to report all positive results to the appropriate  public health authorities. Negative results do not preclude SARS-CoV-2  infection and should not be used as the sole basis for treatment or other  patient management decisions. Negative results must be combined with  clinical observations, patient history,  and epidemiological information.  This test has not been FDA cleared or approved.    This test has been authorized by FDA under an Emergency Use Authorization  (EUA). This test is only authorized for the duration of time the  declaration that circumstances exist justifying the authorization of the  emergency use of an in vitro diagnostic tests for detection of SARS-CoV-2  virus and/or diagnosis of COVID-19 infection under section 564(b)(1) of  the Act, 21 U.S.C. 360bbb-3(b)(1), unless the authorization is terminated  or revoked sooner. The test has been validated but independent review by FDA  and CLIA is pending.    Test performed using [employer] Gene[employer]pert: This RT-PCR assay targets N2,  a region unique to SARS-CoV-2. A conserved region in the E-gene was chosen  for pan-Sarbecovirus detection which includes SARS-CoV-2.    According to CMS-2020-01-R, this platform meets the definition of high-throughput technology.    Comprehensive metabolic panel [[id number]]  (Abnormal) Collected: 05/23/24 1657    Lab Status: Final result Specimen: Blood from Arm, Left Updated: 05/23/24 1733     Sodium 132 mmol/L      Potassium 4.2 mmol/L      Chloride 103 mmol/L      CO2 20 mmol/L      ANION GAP 9 mmol/L      BUN 40 mg/dL      Creatinine 1.07 mg/dL      Glucose 109 mg/dL      Calcium 9.3 mg/dL      AST 14 U/L      ALT 7 U/L      Alkaline Phosphatase 38 U/L      Total Protein 6.1 g/dL      Albumin 3.8 g/dL      Total Bilirubin 0.52 mg/dL      eGFR 46 ml/min/1.73sq m     Narrative:      National Kidney Disease Foundation guidelines for Chronic Kidney Disease (CKD):     Stage 1 with normal or high GFR (GFR > 90 mL/min/1.73 square meters)    Stage 2 Mild CKD (GFR = 60-89 mL/min/1.73 square meters)    Stage 3A Moderate CKD (GFR = 45-59 mL/min/1.73 square meters)    Stage 3B Moderate CKD (GFR = 30-44 mL/min/1.73 square meters)    Stage 4 Severe CKD (GFR = 15-29 mL/min/1.73 square meters)    Stage 5 End Stage CKD (GFR <15 mL/min/1.73  square meters)  Note: GFR calculation is accurate only with a steady state creatinine    Lactic acid [[id number]]  (Normal) Collected: 05/23/24 1657    Lab Status: Final result Specimen: Blood from Arm, Left Updated: 05/23/24 1731     LACTIC ACID 1.5 mmol/L     Narrative:      Result may be elevated if tourniquet was used during collection.    CBC and differential [[id number]]  (Abnormal) Collected: 05/23/24 1657    Lab Status: Final result Specimen: Blood from Arm, Left Updated: 05/23/24 1728     WBC 4.57 Thousand/uL      RBC 1.90 Million/uL      Hemoglobin 3.3 g/dL      Hematocrit 12.7 %      MCV 67 fL      MCH 17.4 pg      MCHC 26.0 g/dL      RDW 16.0 %      MPV 10.0 fL      Platelets 398 Thousands/uL      nRBC 4 /100 WBCs      Segmented % 66 %      Immature Grans % 1 %      Lymphocytes % 22 %      Monocytes % 10 %      Eosinophils Relative 1 %      Basophils Relative 0 %      Absolute Neutrophils 3.00 Thousands/µL      Absolute Immature Grans 0.03 Thousand/uL      Absolute Lymphocytes 1.02 Thousands/µL      Absolute Monocytes 0.45 Thousand/µL      Eosinophils Absolute 0.05 Thousand/µL      Basophils Absolute 0.02 Thousands/µL     Narrative:      This is an appended report.  These results have been appended to a previously verified report.    Protime-INR [[id number]]  (Abnormal) Collected: 05/23/24 1657    Lab Status: Final result Specimen: Blood from Arm, Left Updated: 05/23/24 1723     Protime 17.2 seconds      INR 1.36    APTT [[id number]]  (Normal) Collected: 05/23/24 1657    Lab Status: Final result Specimen: Blood from Arm, Left Updated: 05/23/24 1723     PTT 27 seconds     Blood culture #1 [[id number]] Collected: 05/23/24 1657    Lab Status: In process Specimen: Blood from Arm, Left Updated: 05/23/24 1706    Blood culture #2 [[id number]] Collected: 05/23/24 1657    Lab Status: In process Specimen: Blood from Arm, Left Updated: 05/23/24 1706                   CT chest abdomen pelvis wo contrast   Final Result by  Henry Martinez DO (05/23 1932)      1. Distended gallbladder with gallstone and possible wall thickening. Correlate clinically for right upper quadrant tenderness and if relevant ultrasound may be helpful to exclude acute cholecystitis.      2. New small left pleural effusion.      3. Mild circumferential bladder wall thickening, similar to previous study. No significant perivesical inflammatory changes.  If there is clinical concern for cystitis, correlate with urinalysis.      4. Visualization of the interventricular septum suggesting anemia.      5. Colonic diverticulosis without diverticulitis.      Additional incidental findings as above.      *Limited study without IV or oral contrast.         Workstation performed: [id number]         CT head without contrast   Final Result by Henry Martinez DO (05/23 1859)      No acute intracranial abnormality.  Chronic microangiopathic changes.      Findings suggesting acute sinusitis.                  Workstation performed: [id number]         XR chest 1 view portable    (Results Pending)              Procedures  CriticalCare Time    Date/Time: 5/23/2024 8:10 PM    Performed by: Roberto Goodrich DO  Authorized by: Roberto Goodrich DO    Critical care provider statement:     Critical care time (minutes):  45    Critical care time was exclusive of:  Teaching time and separately billable procedures and treating other patients    Critical care was necessary to treat or prevent imminent or life-threatening deterioration of the following conditions:  Circulatory failure and cardiac failure    Critical care was time spent personally by me on the following activities:  Examination of patient, evaluation of patient's response to treatment, development of treatment plan with patient or surrogate, obtaining history from patient or surrogate, ordering and review of laboratory studies, ordering and review of radiographic studies, review of old charts,  re-evaluation of patient's condition and ordering and performing treatments and interventions  Comments:      A-fib RVR with borderline blood pressure at risk for sudden cardiovascular collapse and significant morbidity mortality requiring IV chronotropic agents, frequent reassessments.  Severe anemia requiring emergent transfusion in the emergency department with evidence of endorgan damage  ECG 12 Lead Documentation Only    Date/Time: 5/23/2024 8:13 PM    Performed by: Roberto Goodrich DO  Authorized by: Roberto Goodrich DO    Indications / Diagnosis:  Tachycardia  Patient location:  ED  Interpretation:     Interpretation: abnormal    Rate:     ECG rate:  155    ECG rate assessment: tachycardic    Rhythm:     Rhythm: atrial fibrillation    ST segments:     ST segments:  Non-specific    Depression:  V3, V4, V5 and V6           ED Course  ED Course as of 05/23/24 2014   Thu May 23, 2024   1732 Hemoglobin(!!): 3.3  Will transfuse.   1835 Patient was significant bleeding low hemoglobin, consented for blood transfusion.  There is no injury from the fall that she reported, her anemia is not from traumatic causes based on the timeline.  There is no evidence of trauma on her body.   1951 Blood pressure improved.  Heart rate improved after IV metoprolol.  Will admit patient for severe anemia, A-fib RVR.                               SBIRT 20yo+      Flowsheet Row Most Recent Value   Initial Alcohol Screen: US AUDIT-C     1. How often do you have a drink containing alcohol? 0 Filed at: 05/23/2024 1647   2. How many drinks containing alcohol do you have on a typical day you are drinking?  0 Filed at: 05/23/2024 1647   3a. Male UNDER 65: How often do you have five or more drinks on one occasion? 0 Filed at: 05/23/2024 1647   3b. FEMALE Any Age, or MALE 65+: How often do you have 4 or more drinks on one occassion? 0 Filed at: 05/23/2024 1647   Audit-C Score 0 Filed at: 05/23/2024 1647   CHRISTA: How many times in the past  year have you...    Used an illegal drug or used a prescription medication for non-medical reasons? Never Filed at: 05/23/2024 3149                      Medical Decision Making  88-year-old female presents with complaint that she fell 5 weeks ago.  No evidence of trauma on exam.  Concerned that she may be altered at the time likely known checked on her for 6 weeks as she is not overly malnourished.  She is tachycardic with an irregular rhythm, A-fib RVR on EKG.  She is warm to the touch and blood pressures low normal.  Possible sepsis/infectious cause.  Patient has a history of urine incontinence.    Will get CBC, lactic acid, chemistry look for electrolyte imbalance, renal failure, leukocytosis, possible infectious source.  Will CT the head due to her altered mental status.  Patient is upset that she fell several weeks ago although no evidence of acute injury from that fall.    CT scans viewed and interpreted by me, no acute disease appreciated.  Urinalysis possibly suggestive of UTI.  She is significantly anemic, noncompliant with her medications including anticoagulation.  Patient agreeable for transfusion.  Would recommend further evaluation for anemia    Problems Addressed:  Anemia: acute illness or injury  Atrial fibrillation with rapid ventricular response (HCC): acute illness or injury  UTI (urinary tract infection): acute illness or injury    Amount and/or Complexity of Data Reviewed  Labs: ordered. Decision-making details documented in ED Course.  Radiology: ordered.    Risk  Prescription drug management.  Decision regarding hospitalization.             Disposition  Final diagnoses:   Anemia   UTI (urinary tract infection)   Atrial fibrillation with rapid ventricular response (HCC)     Time reflects when diagnosis was documented in both MDM as applicable and the Disposition within this note       Time User Action Codes Description Comment    5/23/2024  7:50 PM Roberto Goodrich Add [D64.9] Anemia      5/23/2024  7:50 PM Roberto Goodrich [N39.0] UTI (urinary tract infection)     5/23/2024  7:51 PM Roberto Goodrich [I48.91] Atrial fibrillation (HCC)     5/23/2024  7:51 PM Roberto Goodrich Remove [I48.91] Atrial fibrillation (HCC)     5/23/2024  7:51 PM Roberto Goodrich [I48.91] Atrial fibrillation with rapid ventricular response (HCC)           ED Disposition       ED Disposition   Admit    Condition   Stable    Date/Time   Thu May 23, 2024 1950    Comment   Case was discussed with Patience and the patient's admission status was agreed to be Admission Status: inpatient status to the service of Dr. Giordano .               Follow-up Information    None         Patient's Medications   Discharge Prescriptions    No medications on file       No discharge procedures on file.    PDMP Review       None            ED Provider  Electronically Signed by             Roberto Goodrich DO  05/23/24 2014

## 2024-05-24 ENCOUNTER — APPOINTMENT (INPATIENT)
Dept: NON INVASIVE DIAGNOSTICS | Facility: HOSPITAL | Age: 89
DRG: 378 | End: 2024-05-24
Payer: COMMERCIAL

## 2024-05-24 LAB
ABO GROUP BLD BPU: NORMAL
ABO GROUP BLD BPU: NORMAL
ALBUMIN SERPL BCP-MCNC: 3.7 G/DL (ref 3.5–5)
ALP SERPL-CCNC: 42 U/L (ref 34–104)
ALT SERPL W P-5'-P-CCNC: 7 U/L (ref 7–52)
ANION GAP SERPL CALCULATED.3IONS-SCNC: 10 MMOL/L (ref 4–13)
AORTIC ROOT: 3 CM
APICAL FOUR CHAMBER EJECTION FRACTION: 51 %
ASCENDING AORTA: 2.9 CM
AST SERPL W P-5'-P-CCNC: 15 U/L (ref 13–39)
BILIRUB SERPL-MCNC: 1.76 MG/DL (ref 0.2–1)
BPU ID: NORMAL
BPU ID: NORMAL
BSA FOR ECHO PROCEDURE: 1.57 M2
BUN SERPL-MCNC: 31 MG/DL (ref 5–25)
CALCIUM SERPL-MCNC: 9.1 MG/DL (ref 8.4–10.2)
CHLORIDE SERPL-SCNC: 106 MMOL/L (ref 96–108)
CO2 SERPL-SCNC: 18 MMOL/L (ref 21–32)
CREAT SERPL-MCNC: 0.87 MG/DL (ref 0.6–1.3)
CROSSMATCH: NORMAL
CROSSMATCH: NORMAL
DOP CALC LVOT AREA: 3.14 CM2
DOP CALC LVOT DIAMETER: 2 CM
E WAVE DECELERATION TIME: 81 MS
E/A RATIO: 89
ERYTHROCYTE [DISTWIDTH] IN BLOOD BY AUTOMATED COUNT: 17.2 % (ref 11.6–15.1)
FERRITIN SERPL-MCNC: 2 NG/ML (ref 11–307)
FOLATE SERPL-MCNC: 19.7 NG/ML
FRACTIONAL SHORTENING: 35 (ref 28–44)
GFR SERPL CREATININE-BSD FRML MDRD: 60 ML/MIN/1.73SQ M
GLUCOSE SERPL-MCNC: 88 MG/DL (ref 65–140)
HCT VFR BLD AUTO: 23.3 % (ref 34.8–46.1)
HCT VFR BLD AUTO: 24.2 % (ref 34.8–46.1)
HCT VFR BLD AUTO: 25.6 % (ref 34.8–46.1)
HGB BLD-MCNC: 7 G/DL (ref 11.5–15.4)
HGB BLD-MCNC: 7.3 G/DL (ref 11.5–15.4)
HGB BLD-MCNC: 7.7 G/DL (ref 11.5–15.4)
INTERVENTRICULAR SEPTUM IN DIASTOLE (PARASTERNAL SHORT AXIS VIEW): 1.2 CM
INTERVENTRICULAR SEPTUM: 1.2 CM (ref 0.6–1.1)
IRON SERPL-MCNC: <10 UG/DL (ref 50–212)
LAAS-AP2: 19.7 CM2
LAAS-AP4: 19 CM2
LEFT ATRIUM AREA SYSTOLE SINGLE PLANE A4C: 18.4 CM2
LEFT ATRIUM SIZE: 4 CM
LEFT ATRIUM VOLUME (MOD BIPLANE): 62 ML
LEFT ATRIUM VOLUME INDEX (MOD BIPLANE): 39.5 ML/M2
LEFT INTERNAL DIMENSION IN SYSTOLE: 2.6 CM (ref 2.1–4)
LEFT VENTRICULAR INTERNAL DIMENSION IN DIASTOLE: 4 CM (ref 3.5–6)
LEFT VENTRICULAR POSTERIOR WALL IN END DIASTOLE: 1.1 CM
LEFT VENTRICULAR STROKE VOLUME: 44 ML
LVSV (TEICH): 44 ML
MAGNESIUM SERPL-MCNC: 2.3 MG/DL (ref 1.9–2.7)
MCH RBC QN AUTO: 22 PG (ref 26.8–34.3)
MCHC RBC AUTO-ENTMCNC: 30.1 G/DL (ref 31.4–37.4)
MCV RBC AUTO: 73 FL (ref 82–98)
MITRAL REGURGITATION PEAK VELOCITY: 5.51 M/S
MITRAL VALVE MEAN INFLOW VELOCITY: 4.26 M/S
MITRAL VALVE REGURGITANT PEAK GRADIENT: 121 MMHG
MV E'TISSUE VEL-SEP: 7 CM/S
MV PEAK A VEL: 0.01 M/S
MV PEAK E VEL: 89 CM/S
MV STENOSIS PRESSURE HALF TIME: 23 MS
MV VALVE AREA P 1/2 METHOD: 9.57
PHOSPHATE SERPL-MCNC: 3.2 MG/DL (ref 2.3–4.1)
PLATELET # BLD AUTO: 309 THOUSANDS/UL (ref 149–390)
PMV BLD AUTO: 9.5 FL (ref 8.9–12.7)
POTASSIUM SERPL-SCNC: 3.9 MMOL/L (ref 3.5–5.3)
PROT SERPL-MCNC: 5.9 G/DL (ref 6.4–8.4)
RA PRESSURE ESTIMATED: 3 MMHG
RBC # BLD AUTO: 3.5 MILLION/UL (ref 3.81–5.12)
RIGHT ATRIUM AREA SYSTOLE A4C: 21.7 CM2
RIGHT VENTRICLE ID DIMENSION: 3.9 CM
RV PSP: 33 MMHG
SL CV DOP CALC MV VTI RETROGRADE: 160.5 CM
SL CV LEFT ATRIUM LENGTH A2C: 4.8 CM
SL CV LV EF: 55
SL CV MV MEAN GRADIENT RETROGRADE: 79 MMHG
SL CV PED ECHO LEFT VENTRICLE DIASTOLIC VOLUME (MOD BIPLANE) 2D: 70 ML
SL CV PED ECHO LEFT VENTRICLE SYSTOLIC VOLUME (MOD BIPLANE) 2D: 26 ML
SL CV TR MAX PG ANTEGRADE: 26 MMHG
SODIUM SERPL-SCNC: 134 MMOL/L (ref 135–147)
TR MAX PG: 30 MMHG
TR PEAK VELOCITY: 2.7 M/S
TRICUSPID ANNULAR PLANE SYSTOLIC EXCURSION: 1.4 CM
TRICUSPID VALVE PEAK REGURGITATION VELOCITY: 2.72 M/S
TV MEAN GRADIENT: 18 MMHG
TV MV D: 2.05 M/S
TV VTI: 67.32 CM
UIBC SERPL-MCNC: 397 UG/DL (ref 155–355)
UNIT DISPENSE STATUS: NORMAL
UNIT DISPENSE STATUS: NORMAL
UNIT PRODUCT CODE: NORMAL
UNIT PRODUCT CODE: NORMAL
UNIT PRODUCT VOLUME: 350 ML
UNIT PRODUCT VOLUME: 350 ML
UNIT RH: NORMAL
UNIT RH: NORMAL
VIT B12 SERPL-MCNC: 421 PG/ML (ref 180–914)
WBC # BLD AUTO: 6.59 THOUSAND/UL (ref 4.31–10.16)

## 2024-05-24 PROCEDURE — 93306 TTE W/DOPPLER COMPLETE: CPT

## 2024-05-24 PROCEDURE — 97167 OT EVAL HIGH COMPLEX 60 MIN: CPT

## 2024-05-24 PROCEDURE — C9113 INJ PANTOPRAZOLE SODIUM, VIA: HCPCS | Performed by: PHYSICIAN ASSISTANT

## 2024-05-24 PROCEDURE — 85027 COMPLETE CBC AUTOMATED: CPT | Performed by: PHYSICIAN ASSISTANT

## 2024-05-24 PROCEDURE — 84100 ASSAY OF PHOSPHORUS: CPT | Performed by: PHYSICIAN ASSISTANT

## 2024-05-24 PROCEDURE — 97530 THERAPEUTIC ACTIVITIES: CPT

## 2024-05-24 PROCEDURE — 93306 TTE W/DOPPLER COMPLETE: CPT | Performed by: INTERNAL MEDICINE

## 2024-05-24 PROCEDURE — 99232 SBSQ HOSP IP/OBS MODERATE 35: CPT | Performed by: INTERNAL MEDICINE

## 2024-05-24 PROCEDURE — 85018 HEMOGLOBIN: CPT

## 2024-05-24 PROCEDURE — 85018 HEMOGLOBIN: CPT | Performed by: PHYSICIAN ASSISTANT

## 2024-05-24 PROCEDURE — 85014 HEMATOCRIT: CPT

## 2024-05-24 PROCEDURE — 85014 HEMATOCRIT: CPT | Performed by: PHYSICIAN ASSISTANT

## 2024-05-24 PROCEDURE — 97163 PT EVAL HIGH COMPLEX 45 MIN: CPT

## 2024-05-24 PROCEDURE — 83735 ASSAY OF MAGNESIUM: CPT | Performed by: PHYSICIAN ASSISTANT

## 2024-05-24 PROCEDURE — 80053 COMPREHEN METABOLIC PANEL: CPT | Performed by: PHYSICIAN ASSISTANT

## 2024-05-24 PROCEDURE — 87081 CULTURE SCREEN ONLY: CPT | Performed by: INTERNAL MEDICINE

## 2024-05-24 PROCEDURE — 99223 1ST HOSP IP/OBS HIGH 75: CPT | Performed by: INTERNAL MEDICINE

## 2024-05-24 RX ADMIN — Medication 12.5 MG: at 20:37

## 2024-05-24 RX ADMIN — IRON SUCROSE 200 MG: 20 INJECTION, SOLUTION INTRAVENOUS at 09:56

## 2024-05-24 RX ADMIN — PANTOPRAZOLE SODIUM 40 MG: 40 INJECTION, POWDER, FOR SOLUTION INTRAVENOUS at 08:32

## 2024-05-24 RX ADMIN — METOPROLOL TARTRATE 2.5 MG: 5 INJECTION INTRAVENOUS at 09:04

## 2024-05-24 RX ADMIN — PANTOPRAZOLE SODIUM 40 MG: 40 INJECTION, POWDER, FOR SOLUTION INTRAVENOUS at 20:37

## 2024-05-24 RX ADMIN — Medication 12.5 MG: at 13:52

## 2024-05-24 RX ADMIN — ACETAMINOPHEN 650 MG: 325 TABLET, FILM COATED ORAL at 20:40

## 2024-05-24 NOTE — ASSESSMENT & PLAN NOTE
Reports a fall either 5 to 8 weeks ago with head strike from wheelchair, states that she was never evaluated since the fall  She denies any pain, but states that she has been primarily bedbound since fall  CT C/A/P without any evidence of trauma  Fall precautions  PT OT

## 2024-05-24 NOTE — CASE MANAGEMENT
Case Management Assessment & Discharge Planning Note    Patient name Amanda Gonzalez  Location  SDU/-01 S* MRN [id number]  : 1935 Date 2024       Current Admission Date: 2024  Current Admission Diagnosis:Anemia   Patient Active Problem List    Diagnosis Date Noted Date Diagnosed    Anemia 2024     Cystitis 2024     Other problems related to medical facilities and other health care 2024     Fall 2024     Abnormal EKG 2024     Abnormal CT scan, gallbladder 2024     Hyponatremia 2024     Paroxysmal atrial fibrillation (HCC) 2024     Atrial fibrillation with RVR (HCC) 2023     Hypertensive urgency 12/15/2023     Ambulatory dysfunction 12/15/2023     Primary osteoarthritis of left hip 2021     Lumbar spondylosis 07/10/2019     Hip pain, chronic, left 07/10/2019     Chronic pain syndrome 07/10/2019     Back pain 2017     Bilateral low back pain without sciatica 2017     Greater trochanteric bursitis of both hips 2017     Primary osteoarthritis of both knees 2016     Allergic rhinitis 2015     Colon polyps 2014     GERD (gastroesophageal reflux disease) 2014     Generalized osteoarthritis of multiple sites 2013     Hyperlipidemia 2013     Hypertension 2013       LOS (days): 1  Geometric Mean LOS (GMLOS) (days): 3  Days to GMLOS:2.1     OBJECTIVE:    Risk of Unplanned Readmission Score: 18.68         Current admission status: Inpatient       Preferred Pharmacy:   [facility] [address] [address]  Phone: [phone] Fax: [phone]    [facility] [place], [place] - [address] [facility]  [address] [facility] [address]  Phone: [phone] Fax: [phone]    Primary Care Provider: Leila Mcknight DO    Primary Insurance: [facility] RE[facility]  Secondary Insurance: [facility]  HEALTHCHOICES    ASSESSMENT:  Active Health Care Proxies       Rachel Youssef [facility] Care Representative - Child   Primary Phone: [phone] (Mobile)  Home Phone: [phone]                 Advance Directives  Does patient have a Health Care POA?: Yes  Does patient have Advance Directives?: Yes  Advance Directives: Power of  for health care  Primary Contact: Rachel Youssef: dtr: [phone]    Readmission Root Cause  30 Day Readmission: No    Patient Information  Admitted from:: Facility  Mental Status: Alert  During Assessment patient was accompanied by: Not accompanied during assessment  Assessment information provided by:: Daughter  Primary Caregiver: Other (Comment) ([facility])  Support Systems: Self, Daughter  County of Residence: [place]  What [facility] do you live in?: [place]  Home entry access options. Select all that apply.: No steps to enter home  Type of Current Residence: Facility  Upon entering residence, is there a bedroom on the main floor (no further steps)?: Yes  Upon entering residence, is there a bathroom on the main floor (no further steps)?: Yes  Living Arrangements: Other (Comment) (Resides at [facility])    Activities of Daily Living Prior to Admission  Functional Status: Assistance  Completes ADLs independently?: No  Level of ADL dependence: Assistance  Ambulates independently?: No  Level of ambulatory dependence: Assistance  Does patient use assisted devices?: Yes  Assisted Devices (DME) used: Wheelchair  Does patient currently own DME?: No  Does patient have a history of Outpatient Therapy (PT/OT)?: No  Does the patient have a history of Short-Term Rehab?: Yes ([facility])  Does patient have a history of HHC?: No  Does patient currently have HHC?: No    Patient Information Continued  Income Source: Pension/senior living  Does patient have prescription coverage?: Yes  Does patient receive dialysis treatments?: No  Does patient have a history of substance abuse?: No  Does patient  have a history of Mental Health Diagnosis?: No         Means of Transportation  Means of Transport to Appts:: Other (Comment) (medical transport)      Social Determinants of Health (SDOH)      Flowsheet Row Most Recent Value   Housing Stability    In the last 12 months, was there a time when you were not able to pay the mortgage or rent on time? N   In the past 12 months, how many times have you moved where you were living? 2   At any time in the past 12 months, were you homeless or living in a shelter (including now)? N   Transportation Needs    In the past 12 months, has lack of transportation kept you from medical appointments or from getting medications? no   In the past 12 months, has lack of transportation kept you from meetings, work, or from getting things needed for daily living? No   Food Insecurity    Within the past 12 months, you worried that your food would run out before you got the money to buy more. Never true   Within the past 12 months, the food you bought just didn't last and you didn't have money to get more. Never true   Utilities    In the past 12 months has the electric, gas, oil, or water company threatened to shut off services in your home? No            DISCHARGE DETAILS:    Discharge planning discussed with:: patient;s dtr  Freedom of Choice: Yes  Comments - Freedom of Choice: Plan to return to [facility] upon discharge  CM contacted family/caregiver?: Yes    Contacts  Patient Contacts: Rachel Youssef: theodora  Relationship to Patient:: Family  Contact Method: Phone  Phone Number: [phone]  Reason/Outcome: Emergency Contact, Discharge Planning    Additional Comments: Acknowledge Pt is from [facility]. Assistance x1 to wheelchair. Call placed to Pt's dtr(Rachel: [phone]), discussed role of case management. Pt's dtr confirms she is Pt's POA. Pt's dtr reports Pt uses wheelchair and has assistance with adls. Pt has been at [facility] since Jan 2024 and is LTC. Pt's dtr reports Pt was  at [place] prior to going to [facility]. Referral sent to [facility] via [facility]. CM to follow.

## 2024-05-24 NOTE — PLAN OF CARE
Problem: Potential for Falls  Goal: Patient will remain free of falls  Description: INTERVENTIONS:  - Educate patient/family on patient safety including physical limitations  - Instruct patient to call for assistance with activity   - Consult OT/PT to assist with strengthening/mobility   - Keep Call bell within reach  - Keep bed low and locked with side rails adjusted as appropriate  - Keep care items and personal belongings within reach  - Initiate and maintain comfort rounds  - Make Fall Risk Sign visible to staff  - Offer Toileting every 2 Hours, in advance of need  - Initiate/Maintain bed alarm  - Obtain necessary fall risk management equipment  Problem: INFECTION - ADULT  Goal: Absence or prevention of progression during hospitalization  Description: INTERVENTIONS:  - Assess and monitor for signs and symptoms of infection  - Monitor lab/diagnostic results  - Monitor all insertion sites, i.e. indwelling lines, tubes, and drains  - Monitor endotracheal if appropriate and nasal secretions for changes in amount and color  - Central appropriate cooling/warming therapies per order  - Administer medications as ordered  - Instruct and encourage patient and family to use good hand hygiene technique  - Identify and instruct in appropriate isolation precautions for identified infection/condition  Outcome: Progressing  Goal: Absence of fever/infection during neutropenic period  Description: INTERVENTIONS:  - Monitor WBC    Outcome: Progressing     Problem: PAIN - ADULT  Goal: Verbalizes/displays adequate comfort level or baseline comfort level  Description: Interventions:  - Encourage patient to monitor pain and request assistance  - Assess pain using appropriate pain scale  - Administer analgesics based on type and severity of pain and evaluate response  - Implement non-pharmacological measures as appropriate and evaluate response  - Consider cultural and social influences on pain and pain management  - Notify  physician/advanced practitioner if interventions unsuccessful or patient reports new pain  Outcome: Progressing     Problem: SAFETY ADULT  Goal: Patient will remain free of falls  Description: INTERVENTIONS:  - Educate patient/family on patient safety including physical limitations  - Instruct patient to call for assistance with activity   - Consult OT/PT to assist with strengthening/mobility   - Keep Call bell within reach  - Keep bed low and locked with side rails adjusted as appropriate  - Keep care items and personal belongings within reach  - Initiate and maintain comfort rounds  - Apply yellow socks and bracelet for high fall risk patients  - Consider moving patient to room near nurses station  Outcome: Progressing  Goal: Maintain or return to baseline ADL function  Description: INTERVENTIONS:  -  Assess patient's ability to carry out ADLs; assess patient's baseline for ADL function and identify physical deficits which impact ability to perform ADLs (bathing, care of mouth/teeth, toileting, grooming, dressing, etc.)  - Assess/evaluate cause of self-care deficits   - Assess range of motion  - Assess patient's mobility; develop plan if impaired  - Assess patient's need for assistive devices and provide as appropriate  - Encourage maximum independence but intervene and supervise when necessary  - Involve family in performance of ADLs  - Assess for home care needs following discharge   - Consider OT consult to assist with ADL evaluation and planning for discharge  - Provide patient education as appropriate  Outcome: Progressing  Goal: Maintains/Returns to pre admission functional level  Description: INTERVENTIONS:  - Perform AM-PAC 6 Click Basic Mobility/ Daily Activity assessment daily.  - Set and communicate daily mobility goal to care team and patient/family/caregiver.   - Collaborate with rehabilitation services on mobility goals if consulted    Problem: RESPIRATORY - ADULT  Goal: Achieves optimal ventilation  and oxygenation  Description: INTERVENTIONS:  - Assess for changes in respiratory status  - Assess for changes in mentation and behavior  - Position to facilitate oxygenation and minimize respiratory effort  - Oxygen administered by appropriate delivery if ordered  - Initiate smoking cessation education as indicated  - Encourage broncho-pulmonary hygiene including cough, deep breathe, Incentive Spirometry  - Assess the need for suctioning and aspirate as needed  - Assess and instruct to report SOB or any respiratory difficulty  - Respiratory Therapy support as indicated  Outcome: Progressing     Problem: HEMATOLOGIC - ADULT  Goal: Maintains hematologic stability  Description: INTERVENTIONS  - Assess for signs and symptoms of bleeding or hemorrhage  - Monitor labs  - Administer supportive blood products/factors as ordered and appropriate  Outcome: Progressing     Problem: MUSCULOSKELETAL - ADULT  Goal: Maintain or return mobility to safest level of function  Description: INTERVENTIONS:  - Assess patient's ability to carry out ADLs; assess patient's baseline for ADL function and identify physical deficits which impact ability to perform ADLs (bathing, care of mouth/teeth, toileting, grooming, dressing, etc.)  - Assess/evaluate cause of self-care deficits   - Assess range of motion  - Assess patient's mobility  - Assess patient's need for assistive devices and provide as appropriate  - Encourage maximum independence but intervene and supervise when necessary  - Involve family in performance of ADLs  - Assess for home care needs following discharge   - Consider OT consult to assist with ADL evaluation and planning for discharge  - Provide patient education as appropriate  Outcome: Progressing  Goal: Maintain proper alignment of affected body part  Description: INTERVENTIONS:  - Support, maintain and protect limb and body alignment  - Provide patient/ family with appropriate education  Outcome: Progressing     Problem:  Prexisting or High Potential for Compromised Skin Integrity  Goal: Skin integrity is maintained or improved  Description: INTERVENTIONS:  - Identify patients at risk for skin breakdown  - Assess and monitor skin integrity  - Assess and monitor nutrition and hydration status  - Monitor labs   - Assess for incontinence   - Turn and reposition patient  - Assist with mobility/ambulation  - Relieve pressure over bony prominences  - Avoid friction and shearing  - Provide appropriate hygiene as needed including keeping skin clean and dry  - Evaluate need for skin moisturizer/barrier cream  - Collaborate with interdisciplinary team   - Patient/family teaching  - Consider wound care consult   Outcome: Progressing     - Out of bed for toileting  - Record patient progress and toleration of activity level   Outcome: Progressing   - Apply yellow socks and bracelet for high fall risk patients  - Consider moving patient to room near nurses station  Outcome: Progressing

## 2024-05-24 NOTE — ASSESSMENT & PLAN NOTE
UA with 10-20 WBCs and innumerable bacteria  Patient denies any symptoms  Continue ceftriaxone  Follow-up urine culture

## 2024-05-24 NOTE — ASSESSMENT & PLAN NOTE
Home regimen: Metoprolol tartate 12.5mg BID and lisinopril 10 Mg daily  Hold lisinopril in setting of severe anemia, continue metoprolol in a.m.

## 2024-05-24 NOTE — ASSESSMENT & PLAN NOTE
UA with 10-20 WBCs and innumerable bacteria  Patient denies any symptoms-discontinue antibiotics patient is asymptomatic.

## 2024-05-24 NOTE — ED NOTES
While this RN doing assessment on patient during blood administration patient reported that I did not know what I was doing and how I can't get anything to work properly when thermometer was not working.        Seema Martínez  05/23/24 [id number]

## 2024-05-24 NOTE — ASSESSMENT & PLAN NOTE
History of A-fib -presented with a heart rate in the 140s  Reports that she has not been taking any of her medicine for the last few weeks  RC: Metoprolol tartate 12.5 Mg twice daily  AC: Eliquis 2.5 Mg twice daily  Hold home Eliquis in setting of severe anemia  Metoprolol ordered to start in a.m., PRN Lopressor IV ordered for overnight as do not want to eliminate compensatory mechanism for anemia

## 2024-05-24 NOTE — ASSESSMENT & PLAN NOTE
Hgb 3.3 - presented in Afib with RVR but BP stable - suspect slow GI bleed   Patient asymptomatic  On review of SNF paperwork-patient taking Eliquis 2.5 Mg BID and ASA 325mg q8h -hold both of these medications  S/P 1U PRBCs in the ED, will give additional 1 unit and check H&H  Check iron panel, vitamin B12, and folic acid  Goal hemoglobin > 7  Echo from 12/2023 does show significant valvular disease-will monitor respiratory status closely in setting of transfusions  GI consulted due to concern for slow GI bleed  Clear liquid diet until midnight then n.p.o. sips with meds  Protonix 40 Mg IV twice daily started

## 2024-05-24 NOTE — PLAN OF CARE
Problem: PHYSICAL THERAPY ADULT  Goal: Performs mobility at highest level of function for planned discharge setting.  See evaluation for individualized goals.  Description: Treatment/Interventions: Functional transfer training, LE strengthening/ROM, Therapeutic exercise, Endurance training, Cognitive reorientation, Patient/family training, Equipment eval/education, Bed mobility, Spoke to nursing, Gait training, OT          See flowsheet documentation for full assessment, interventions and recommendations.  Note: Prognosis: Guarded  Problem List: Decreased strength, Decreased range of motion, Decreased endurance, Impaired balance, Decreased mobility, Decreased cognition, Impaired judgement, Decreased safety awareness  Assessment: Patient is an 89y/o F who presented to the ED from [facility]. Reports that she fell out of a w/c 5 weeks ago. Patient was found to have anemia with a hgb of 3.3-transfusion completed. Patient also with A-fib with RVR. Patient is a long term care resident at [facility] where she primarily performs transfers to a w/c with assistance of 1. Current medical status includes ICU stay, telemetry, multiple lines, A-fib, decreased cognition, agitation, fall risk, bed/chair alarm, decreased strength, balance, endurance and mobility. Patient required encouragement to participate. She required increased time to complete all mobility and needed assistance of 2 for transfers and a short amb distance to the commode and recliner. Extremely flexed posture and knees when attempting to amb. Patient with HR as high as 150's. RN present. Patient is deconditioned and is not at her functional baseline. Recommending level 2 resources. Moderate intensity. The patient's AM-PAC Basic Mobility Inpatient Short Form Raw Score is 12. A Raw score of less than or equal to 17 suggests the patient may benefit from discharge to post-acute rehabilitation services. Please also refer to the recommendation of the Physical  Therapist for safe discharge planning.  Barriers to Discharge: None     Rehab Resource Intensity Level, PT: II (Moderate Resource Intensity)    See flowsheet documentation for full assessment.

## 2024-05-24 NOTE — PLAN OF CARE
Problem: Potential for Falls  Goal: Patient will remain free of falls  Description: INTERVENTIONS:  - Educate patient/family on patient safety including physical limitations  - Instruct patient to call for assistance with activity   - Consult OT/PT to assist with strengthening/mobility   - Keep Call bell within reach  - Keep bed low and locked with side rails adjusted as appropriate  - Keep care items and personal belongings within reach  - Initiate and maintain comfort rounds  - Make Fall Risk Sign visible to staff    Problem: CARDIOVASCULAR - ADULT  Goal: Maintains optimal cardiac output and hemodynamic stability  Description: INTERVENTIONS:  - Monitor I/O, vital signs and rhythm  - Monitor for S/S and trends of decreased cardiac output  - Administer and titrate ordered vasoactive medications to optimize hemodynamic stability  - Assess quality of pulses, skin color and temperature  - Assess for signs of decreased coronary artery perfusion  - Instruct patient to report change in severity of symptoms  5/24/2024 0925 by Graciela Adams RN  Outcome: Progressing  5/24/2024 0741 by Graciela Adams RN  Outcome: Progressing  Goal: Absence of cardiac dysrhythmias or at baseline rhythm  Description: INTERVENTIONS:  - Continuous cardiac monitoring, vital signs, obtain 12 lead EKG if ordered  - Administer antiarrhythmic and heart rate control medications as ordered  - Monitor electrolytes and administer replacement therapy as ordered  5/24/2024 0925 by Graciela Adams RN  Outcome: Progressing  5/24/2024 0741 by Graciela Adams RN  Outcome: Progressing     Problem: RESPIRATORY - ADULT  Goal: Achieves optimal ventilation and oxygenation  Description: INTERVENTIONS:  - Assess for changes in respiratory status  - Assess for changes in mentation and behavior  - Position to facilitate oxygenation and minimize respiratory effort  - Oxygen administered by appropriate delivery if ordered  - Initiate smoking cessation  education as indicated  - Encourage broncho-pulmonary hygiene including cough, deep breathe, Incentive Spirometry  - Assess the need for suctioning and aspirate as needed  - Assess and instruct to report SOB or any respiratory difficulty  - Respiratory Therapy support as indicated  5/24/2024 0925 by Graciela Adams RN  Outcome: Progressing  5/24/2024 0741 by Graciela Adams RN  Outcome: Progressing     Problem: MUSCULOSKELETAL - ADULT  Goal: Maintain or return mobility to safest level of function  Description: INTERVENTIONS:  - Assess patient's ability to carry out ADLs; assess patient's baseline for ADL function and identify physical deficits which impact ability to perform ADLs (bathing, care of mouth/teeth, toileting, grooming, dressing, etc.)  - Assess/evaluate cause of self-care deficits   - Assess range of motion  - Assess patient's mobility  - Assess patient's need for assistive devices and provide as appropriate  - Encourage maximum independence but intervene and supervise when necessary  - Involve family in performance of ADLs  - Assess for home care needs following discharge   - Consider OT consult to assist with ADL evaluation and planning for discharge  - Provide patient education as appropriate  5/24/2024 0925 by Graciela Adams RN  Outcome: Progressing  5/24/2024 0741 by Graciela Adams RN  Outcome: Progressing  Goal: Maintain proper alignment of affected body part  Description: INTERVENTIONS:  - Support, maintain and protect limb and body alignment  - Provide patient/ family with appropriate education  5/24/2024 0925 by Graciela Adams RN  Outcome: Progressing  5/24/2024 0741 by Graciela Adams RN  Outcome: Progressing     Problem: HEMATOLOGIC - ADULT  Goal: Maintains hematologic stability  Description: INTERVENTIONS  - Assess for signs and symptoms of bleeding or hemorrhage  - Monitor labs  - Administer supportive blood products/factors as ordered and appropriate  5/24/2024 0925 by  Graciela Adams RN  Outcome: Progressing  5/24/2024 0741 by Graciela Adams RN  Outcome: Progressing     Problem: Prexisting or High Potential for Compromised Skin Integrity  Goal: Skin integrity is maintained or improved  Description: INTERVENTIONS:  - Identify patients at risk for skin breakdown  - Assess and monitor skin integrity  - Assess and monitor nutrition and hydration status  - Monitor labs   - Assess for incontinence   - Turn and reposition patient  - Assist with mobility/ambulation  - Relieve pressure over bony prominences  - Avoid friction and shearing  - Provide appropriate hygiene as needed including keeping skin clean and dry  - Evaluate need for skin moisturizer/barrier cream  - Collaborate with interdisciplinary team   - Patient/family teaching  - Consider wound care consult   5/24/2024 0925 by Graciela Adams RN  Outcome: Progressing  5/24/2024 0741 by Graciela Adams RN  Outcome: Progressing     - Apply yellow socks and bracelet for high fall risk patients  - Consider moving patient to room near nurses station  5/24/2024 0925 by Graciela Adams RN  Outcome: Progressing  5/24/2024 0741 by Graciela Adams RN  Outcome: Progressing     Problem: PAIN - ADULT  Goal: Verbalizes/displays adequate comfort level or baseline comfort level  Description: Interventions:  - Encourage patient to monitor pain and request assistance  - Assess pain using appropriate pain scale  - Administer analgesics based on type and severity of pain and evaluate response  - Implement non-pharmacological measures as appropriate and evaluate response  - Consider cultural and social influences on pain and pain management  - Notify physician/advanced practitioner if interventions unsuccessful or patient reports new pain  5/24/2024 0925 by Graciela Adams RN  Outcome: Progressing  5/24/2024 0741 by Graciela Adams RN  Outcome: Progressing     Problem: INFECTION - ADULT  Goal: Absence or prevention of progression  during hospitalization  Description: INTERVENTIONS:  - Assess and monitor for signs and symptoms of infection  - Monitor lab/diagnostic results  - Monitor all insertion sites, i.e. indwelling lines, tubes, and drains  - Monitor endotracheal if appropriate and nasal secretions for changes in amount and color  - [place] appropriate cooling/warming therapies per order  - Administer medications as ordered  - Instruct and encourage patient and family to use good hand hygiene technique  - Identify and instruct in appropriate isolation precautions for identified infection/condition  5/24/2024 0925 by Graciela Adams RN  Outcome: Progressing  5/24/2024 0741 by Graciela Adams RN  Outcome: Progressing  Goal: Absence of fever/infection during neutropenic period  Description: INTERVENTIONS:  - Monitor WBC    5/24/2024 0925 by Graciela Adams RN  Outcome: Progressing  5/24/2024 0741 by Graciela Adams RN  Outcome: Progressing     Problem: SAFETY ADULT  Goal: Patient will remain free of falls  Description: INTERVENTIONS:  - Educate patient/family on patient safety including physical limitations  - Instruct patient to call for assistance with activity   - Consult OT/PT to assist with strengthening/mobility   - Keep Call bell within reach  - Keep bed low and locked with side rails adjusted as appropriate  - Keep care items and personal belongings within reach  - Initiate and maintain comfort rounds  - Make Fall Risk Sign visible to staff  - Apply yellow socks and bracelet for high fall risk patients  - Consider moving patient to room near nurses station  5/24/2024 0925 by Graciela Adams RN  Outcome: Progressing  5/24/2024 0741 by Graciela Adams RN  Outcome: Progressing  Goal: Maintain or return to baseline ADL function  Description: INTERVENTIONS:  -  Assess patient's ability to carry out ADLs; assess patient's baseline for ADL function and identify physical deficits which impact ability to perform ADLs (bathing,  care of mouth/teeth, toileting, grooming, dressing, etc.)  - Assess/evaluate cause of self-care deficits   - Assess range of motion  - Assess patient's mobility; develop plan if impaired  - Assess patient's need for assistive devices and provide as appropriate  - Encourage maximum independence but intervene and supervise when necessary  - Involve family in performance of ADLs  - Assess for home care needs following discharge   - Consider OT consult to assist with ADL evaluation and planning for discharge  - Provide patient education as appropriate  5/24/2024 0925 by Graciela Adams RN  Outcome: Progressing  5/24/2024 0741 by Graciela Adams RN  Outcome: Progressing  Goal: Maintains/Returns to pre admission functional level  Description: INTERVENTIONS:  - Perform AM-PAC 6 Click Basic Mobility/ Daily Activity assessment daily.  - Set and communicate daily mobility goal to care team and patient/family/caregiver.   - Collaborate with rehabilitation services on mobility goals if consulted    - Record patient progress and toleration of activity level   5/24/2024 0925 by Graciela Adams RN  Outcome: Progressing  5/24/2024 0741 by Graciela Adams RN  Outcome: Progressing

## 2024-05-24 NOTE — ASSESSMENT & PLAN NOTE
"CT A/P: \"Distended gallbladder with gallstone and possible wall thickening.\"  Patient denies any abdominal pain, no RUQ tenderness on exam  No transaminitis, elevated T. bili, or elevated alk phos  Hold on RUQ US at this time  "

## 2024-05-24 NOTE — CONSULTS
Consultation -  [facility]  Amanda Gonzalez 88 y.o. female MRN: [id number]  Unit/Bed#: -01 SDU Encounter: [id number]        Inpatient consult to gastroenterology  Consult performed by: Shwetha Vallejo PA-C  Consult ordered by: Patience Jimenez PA-C          ASSESSMENT and PLAN:      88-year-old female with history of atrial fibrillation on Eliquis, hypertension, osteoarthritis, ambulatory dysfunction admitted with severe anemia and A-fib with RVR.     1) Iron deficiency anemia: Hemoglobin 3.3 on admission, down from 10.4 in December 2023. Iron studies severely low - ferritin 2, serum iron < 10, iron sat and TIBC unable to calculate. B12 and folate normal. No reports of overt GI bleeding. She does take Eliquis for history of A-fib, but reportedly has been throwing away pills at her nursing facility? Concern for chronic GI blood loss from upper or lower source such as peptic ulcer, AVM, large polyp, malignancy. No recent EGD or colonoscopy.    -Recommend inpatient EGD and colonoscopy once stable from cardiac standpoint  -Continue to hold Eliquis  -She was transfused 2 units PRBCs and hemoglobin came up to 7.7  -Monitor H&H and transfuse as needed  -Continue Protonix 40 mg twice daily  -Agree with IV iron infusions    2) Atrial fibrillation with RVR  3) Abnormal EKG  Present on admission. Currently on rate control with beta-blocker. EKG showing ST depressions.    -Follow-up results of echocardiogram  -Consider cardiology consultation  -Continue beta-blocker  -Continue to hold Eliquis due to anemia    4) Cystitis    -Continue IV antibiotics  -Follow-up urine culture  -Management as per internal medicine    Patient was seen and examined by Dr. Galvez. All ramos medical decisions were made by Dr. Galvez. Thank you for allowing us to participate in the care of this present patient. We will follow-up with you closely.    Reason for Consult / Principal Problem: anemia    HPI: 88-year-old  female with history of atrial fibrillation on Eliquis, hypertension, osteoarthritis, ambulatory dysfunction admitted with severe anemia and A-fib with RVR.     Patient presented to the ED yesterday apparently with several complaints including a fall out of her wheelchair 5 weeks ago. She was noted to have hemoglobin 3.3, down from 10.4 in December 2023.  She has baseline constipation but denies any black or bloody stools. She states she moves her bowels normally every 8 to 9 days. She denies abdominal pain. She denies nausea and vomiting. She states she has been eating okay. She denies chest pain and shortness of breath. She denies dizziness and lightheadedness.    She believes she had EGD and colonoscopy about 20 years ago, reason unclear.    REVIEW OF SYSTEMS:    CONSTITUTIONAL: Denies any fever, chills. Good appetite, and no recent weight loss.  HEENT: No earache or tinnitus. Denies hearing loss or visual disturbances.  CARDIOVASCULAR: No chest pain or palpitations.   RESPIRATORY: Denies any cough, hemoptysis, shortness of breath or dyspnea on exertion.  GASTROINTESTINAL: As noted in the History of Present Illness.   GENITOURINARY: No problems with urination. Denies any hematuria or dysuria.  NEUROLOGIC: No dizziness or vertigo, denies headaches.   MUSCULOSKELETAL: Denies any muscle or joint pain.   SKIN: Denies skin rashes or itching.   ENDOCRINE: Denies excessive thirst. Denies intolerance to heat or cold.  PSYCHOSOCIAL: Denies depression or anxiety. Denies any recent memory loss.       Historical Information   Past Medical History:   Diagnosis Date    Allergic rhinitis     Arthritis     Back pain     Colon polyps     GERD (gastroesophageal reflux disease)     Hyperlipidemia     Hypertension      Past Surgical History:   Procedure Laterality Date    DILATION AND CURETTAGE OF UTERUS      TONSILLECTOMY       Social History   Social History     Substance and Sexual Activity   Alcohol Use Yes    Alcohol/week: 5.0  "standard drinks of alcohol    Types: 5 Shots of liquor per week    Comment: patient states she drinks out of the bottle daily      Social History     Substance and Sexual Activity   Drug Use No     Social History     Tobacco Use   Smoking Status Former   Smokeless Tobacco Never   Tobacco Comments    quit 50 years ago     Family History   Problem Relation Age of Onset    Breast cancer Sister     Cancer Brother        Meds/Allergies       Medications Prior to Admission:     apixaban (ELIQUIS) 2.5 mg    aspirin 325 mg tablet    Cholecalciferol (Vitamin D3) 50 MCG (2000 UT) TABS    folic acid (FOLVITE) 1 mg tablet    lisinopril (ZESTRIL) 10 mg tablet    metoprolol tartrate (LOPRESSOR) 25 mg tablet    thiamine 100 MG tablet    acetaminophen (TYLENOL) 500 mg tablet    acetaminophen (TYLENOL) 650 mg suppository    Diapers & Supplies MISC    Diclofenac Sodium (VOLTAREN) 1 %    lidocaine (LIDODERM) 5 %    Sanitary Napkins & Tampons (KOTEX MAXI OVERNITE) PADS  Current Facility-Administered Medications   Medication Dose Route Frequency    acetaminophen (TYLENOL) tablet 650 mg  650 mg Oral Q6H PRN    cefTRIAXone (ROCEPHIN) IVPB (premix in dextrose) 1,000 mg 50 mL  1,000 mg Intravenous Q24H    iron sucrose (VENOFER) 200 mg in sodium chloride 0.9 % 100 mL IVPB  200 mg Intravenous Daily    metoprolol (LOPRESSOR) injection 2.5 mg  2.5 mg Intravenous Q6H PRN    metoprolol tartrate (LOPRESSOR) partial tablet 12.5 mg  12.5 mg Oral Q12H JITENDRA    ondansetron (ZOFRAN) injection 4 mg  4 mg Intravenous Q6H PRN    pantoprazole (PROTONIX) injection 40 mg  40 mg Intravenous Q12H JITENDRA       Allergies   Allergen Reactions    Tramadol Headache           Objective     Blood pressure 132/75, pulse 102, temperature 98.3 °F (36.8 °C), temperature source Oral, resp. rate 15, height 5' 5\" (1.651 m), weight 53 kg (116 lb 13.5 oz), SpO2 96%.      Intake/Output Summary (Last 24 hours) at 5/24/2024 0812  Last data filed at 5/24/2024 0736  Gross per 24 hour "   Intake 1400 ml   Output 0 ml   Net 1400 ml         PHYSICAL EXAM:      General Appearance:   Alert, elderly female, cooperative, no distress, appears stated age    HEENT:   Normocephalic, atraumatic, anicteric.     Neck:  Supple, symmetrical, trachea midline, no adenopathy   Lungs:   Clear to auscultation bilaterally   Heart::   Tachycardia, irregular rhythm   Abdomen:   Soft, non-tender, non-distended; normal bowel sounds   Genitalia:   Deferred    Rectal:   Deferred    Extremities:  No cyanosis, clubbing or edema    Pulses:  2+ and symmetric all extremities    Skin:  Skin color, texture, turgor normal, no rashes or lesions    Lymph nodes:  No palpable cervical lymphadenopathy        Lab Results:   Results from last 7 days   Lab Units 05/24/24  0500 05/24/24  0026 05/23/24  1657   WBC Thousand/uL 6.59  --  4.57   HEMOGLOBIN g/dL 7.7*   < > 3.3*   HEMATOCRIT % 25.6*   < > 12.7*   PLATELETS Thousands/uL 309  --  398*   SEGS PCT %  --   --  66   LYMPHO PCT %  --   --  22   MONO PCT %  --   --  10   EOS PCT %  --   --  1    < > = values in this interval not displayed.     Results from last 7 days   Lab Units 05/24/24  0500   POTASSIUM mmol/L 3.9   CHLORIDE mmol/L 106   CO2 mmol/L 18*   BUN mg/dL 31*   CREATININE mg/dL 0.87   CALCIUM mg/dL 9.1   ALK PHOS U/L 42   ALT U/L 7   AST U/L 15     Results from last 7 days   Lab Units 05/23/24  1657   INR  1.36*           Imaging Studies: I have personally reviewed pertinent imaging studies.    CT chest abdomen pelvis wo contrast    Result Date: 5/23/2024  Impression: 1. Distended gallbladder with gallstone and possible wall thickening. Correlate clinically for right upper quadrant tenderness and if relevant ultrasound may be helpful to exclude acute cholecystitis. 2. New small left pleural effusion. 3. Mild circumferential bladder wall thickening, similar to previous study. No significant perivesical inflammatory changes.  If there is clinical concern for cystitis, correlate  with urinalysis. 4. Visualization of the interventricular septum suggesting anemia. 5. Colonic diverticulosis without diverticulitis. Additional incidental findings as above. *Limited study without IV or oral contrast. Workstation performed: [id number]     CT head without contrast    Result Date: 5/23/2024  Impression: No acute intracranial abnormality.  Chronic microangiopathic changes. Findings suggesting acute sinusitis. Workstation performed: [id number]

## 2024-05-24 NOTE — PLAN OF CARE
Problem: OCCUPATIONAL THERAPY ADULT  Goal: Performs self-care activities at highest level of function for planned discharge setting.  See evaluation for individualized goals.  Description: Treatment Interventions: ADL retraining, Functional transfer training, Endurance training, Patient/family training, Equipment evaluation/education, UE strengthening/ROM, Compensatory technique education, Continued evaluation, Activityengagement          See flowsheet documentation for full assessment, interventions and recommendations.   Note: Limitation: Decreased ADL status, Decreased Safe judgement during ADL, Decreased cognition, Decreased endurance, Decreased self-care trans, Decreased high-level ADLs, Mood limitation  Prognosis: Fair  Assessment: Pt is a 88 y.o. female seen for OT evaluation at [facility], admitted 5/23/2024 w/ fall x5 weeks ago. Upon admission, pt found to be anemic with HgB of 3.3. OT completed extensive review of pt's medical and social history. Comorbidities affecting pt's functional performance at time of assessment include: HTN, h/o falls, hyponatremia, cystitis, chronic pain syndrome, ambulatory dysfunciton. Personal factors affecting pt at time of IE include: behavioral pattern, difficulty performing ADLS, difficulty performing IADLS , limited insight into deficits, compliance, health management , and environment. Prior to admission, pt was living as a LTC resident at .  Pt was A w/  ADLS and A w/ IADLS, (-) drove, & required use of wheelchair  PTA. Upon evaluation: Pt requires S for bed mobility, Max Ax2 for functional transfers, Min A for UB ADLs and Max A for LB ADLS 2* the following deficits impacting occupational performance: weakness, decreased strength, decreased balance, decreased tolerance, impaired memory, impaired sequencing, impaired problem solving, decreased safety awareness, and impaired interpersonal skills. Full objective findings from OT assessment regarding body  systems outlined above. Pt to benefit from continued skilled OT tx while in the hospital to address deficits as defined above and maximize level of functional independence w/ ADL's and functional mobility. Occupational Performance areas to address include: grooming, bathing/shower, toilet hygiene, dressing, functional mobility, and clothing management. Based on findings, pt is of high complexity. The patient's raw score on the AM-PAC Daily Activity inpatient short form is 16, standardized score is 35.96, less than 39.4. Patients at this level are likely to benefit from DC to post-acute rehabilitation services. However, please refer to therapist recommendation for discharge planning given other factors that may influence destination. At this time, OT recommendations at time of discharge are DC with Level II - Moderate Rehab Resource Intensity resources.     Rehab Resource Intensity Level, OT: II (Moderate Resource Intensity)

## 2024-05-24 NOTE — PHYSICAL THERAPY NOTE
PHYSICAL THERAPY EVAL/TX  Physical Therapy Evaluation    Performed at least 2 patient identifiers during session:  Patient Active Problem List   Diagnosis    Allergic rhinitis    Back pain    Bilateral low back pain without sciatica    Colon polyps    Generalized osteoarthritis of multiple sites    GERD (gastroesophageal reflux disease)    Greater trochanteric bursitis of both hips    Hyperlipidemia    Hypertension    Primary osteoarthritis of both knees    Lumbar spondylosis    Hip pain, chronic, left    Chronic pain syndrome    Primary osteoarthritis of left hip    Hypertensive urgency    Ambulatory dysfunction    Atrial fibrillation with RVR (HCC)    Paroxysmal atrial fibrillation (HCC)    Anemia    Cystitis    Other problems related to medical facilities and other health care    Fall    Abnormal EKG    Abnormal CT scan, gallbladder    Hyponatremia       Past Medical History:   Diagnosis Date    Allergic rhinitis     Arthritis     Back pain     Colon polyps     GERD (gastroesophageal reflux disease)     Hyperlipidemia     Hypertension        Past Surgical History:   Procedure Laterality Date    DILATION AND CURETTAGE OF UTERUS      TONSILLECTOMY              05/24/24 0914   PT Last Visit   PT Visit Date 05/24/24   Note Type   Note type Evaluation   Pain Assessment   Pain Assessment Tool 0-10   Pain Score No Pain   Restrictions/Precautions   Weight Bearing Precautions Per Order No   Other Precautions Cognitive;Chair Alarm;Bed Alarm;Multiple lines;Telemetry;Fall Risk   Home Living   Type of Home SNF  (LTC at [facility])   Home Layout One level   Home Equipment Wheelchair-manual   Prior Function   Level of [place] Needs assistance with ADLs;Needs assistance with functional mobility;Needs assistance with IADLS   Lives With Facility staff   Receives Help From Personal care attendant   IADLs Family/Friend/Other provides  "transportation;Family/Friend/Other provides meals;Family/Friend/Other provides medication management   Falls in the last 6 months 1 to 4  (Per RN from [facility] - pt with (+) fall out of  on April 19th)   Comments OT Spoke with RN at [facility]. Pt is Ax1 to txfer to WC without RW. RN reports pt can be noncompliant & txfer herself. Pt can typically propel herself   General   Family/Caregiver Present No   Cognition   Overall Cognitive Status Impaired   Arousal/Participation Alert   Attention Attends with cues to redirect   Orientation Level Oriented to person;Disoriented to place;Disoriented to time;Disoriented to situation   Memory Decreased recall of precautions;Decreased recall of recent events   Following Commands Follows one step commands with increased time or repetition   Comments Initially not agreeable- max encouragement provided. Patient mildly agitated during session.   Subjective   Subjective \"I haven't been OOB since my fall.\"   RLE Assessment   RLE Assessment   (Not fully assessed. At least 3/5 all major muscle groups)   LLE Assessment   LLE Assessment   (Not fully assesed- at least 3/5 all major muscle groups)   Bed Mobility   Supine to Sit 5  Supervision   Additional items HOB elevated;Bedrails;Increased time required;Verbal cues   Additional Comments Pt able to reposition hips forward to EOB with significant time & use of bed rails. Pt able to sit EOB with S   Transfers   Sit to Stand 2  Maximal assistance   Additional items Assist x 2;Armrests;Increased time required;Verbal cues   Stand to Sit 2  Maximal assistance   Additional items Assist x 2;Armrests;Increased time required;Verbal cues   Additional Comments Extremely flexed posture and flexed B/L knees in stance.   Ambulation/Elevation   Gait pattern Knees flexed;Excessively slow;Foward flexed  (Difficulty advancing LE's. Tactile and verbal cues given)   Gait Assistance 2  Maximal assist   Additional items Assist x 2;Verbal cues;Tactile " cues   Assistive Device None   Distance 2ft  (bed to commode)   Ambulation/Elevation Additional Comments Refer to treatment session for additional mobility   Balance   Static Sitting Fair +   Dynamic Sitting Fair   Static Standing Poor -   Dynamic Standing Poor -   Ambulatory Zero   Endurance Deficit   Endurance Deficit Yes   Endurance Deficit Description Easily fatigued   Activity Tolerance   Activity Tolerance Patient limited by fatigue   Medical Staff Made Aware Irena YE   Nurse Made Aware RN, Nereyda- present for most of session due to tachycardia   Assessment   Prognosis Guarded   Problem List Decreased strength;Decreased range of motion;Decreased endurance;Impaired balance;Decreased mobility;Decreased cognition;Impaired judgement;Decreased safety awareness   Assessment Patient is an 87y/o F who presented to the ED from [facility]. Reports that she fell out of a w/c 5 weeks ago. Patient was found to have anemia with a hgb of 3.3-transfusion completed. Patient also with A-fib with RVR. Patient is a long term care resident at [facility] where she primarily performs transfers to a w/c with assistance of 1. Current medical status includes ICU stay, telemetry, multiple lines, A-fib, decreased cognition, agitation, fall risk, bed/chair alarm, decreased strength, balance, endurance and mobility. Patient required encouragement to participate. She required increased time to complete all mobility and needed assistance of 2 for transfers and a short amb distance to the commode and recliner. Extremely flexed posture and knees when attempting to amb. Patient with HR as high as 150's. RN present. Patient is deconditioned and is not at her functional baseline. Recommending level 2 resources. Moderate intensity. The patient's AM-PAC Basic Mobility Inpatient Short Form Raw Score is 12. A Raw score of less than or equal to 17 suggests the patient may benefit from discharge to post-acute rehabilitation services. Please also  refer to the recommendation of the Physical Therapist for safe discharge planning.   Barriers to Discharge None   Goals   Patient Goals To rest   STG Expiration Date 06/07/24   Short Term Goal #1 1. Perform supine<>sit with HOB elevated with use of bedrails. 2 Perform sit<>stand transfers with mod A x 1 3. Stand pivot transfers with mod A x 1   PT Treatment Day 1   Plan   Treatment/Interventions Functional transfer training;LE strengthening/ROM;Therapeutic exercise;Endurance training;Cognitive reorientation;Patient/family training;Equipment eval/education;Bed mobility;Spoke to nursing;Gait training;OT   PT Frequency 2-3x/wk   Discharge Recommendation   Rehab Resource Intensity Level, PT II (Moderate Resource Intensity)   AM-PAC Basic Mobility Inpatient   Turning in Flat Bed Without Bedrails 3   Lying on Back to Sitting on Edge of Flat Bed Without Bedrails 3   Moving Bed to Chair 2   Standing Up From Chair Using Arms 2   Walk in Room 1   Climb 3-5 Stairs With Railing 1   Basic Mobility Inpatient Raw Score 12   Basic Mobility Standardized Score 32.23   [facility] Highest Level Of Mobility   -HLM Goal 4: Move to chair/commode   -HLM Achieved 4: Move to chair/commode   Additional Treatment Session   Start Time 0904   End Time 0914   Treatment Assessment Used commode during session. Perform additional sit<>stand transfer with max A x 2. Amb 2ft from commode to recliner chair with max A x 2. Extremely flexed posture, B/L knee flexion. Difficulty advancing LE's.   End of Consult   Patient Position at End of Consult Bedside chair;Bed/Chair alarm activated;All needs within reach     Rhoda Bates, PT             Patient Name: Amanda Gonzalez  Today's Date: 5/24/2024

## 2024-05-24 NOTE — ASSESSMENT & PLAN NOTE
History of A-fib -presented with a heart rate in the 140s  Reports that she has not been taking any of her medicine for the last few weeks  RC: Metoprolol tartate 12.5 Mg twice daily  AC: Eliquis 2.5 Mg twice daily  Hold home Eliquis in setting of severe anemia  Heart rate stable

## 2024-05-24 NOTE — ASSESSMENT & PLAN NOTE
Pt expresses that she does not wish to return to [facility] and would like to live elsewhere after hospitalization   CM consulted

## 2024-05-24 NOTE — UTILIZATION REVIEW
Initial Clinical Review    Admission: Date/Time/Statement:   Admission Orders (From admission, onward)       Ordered        05/23/24 1951  INPATIENT ADMISSION  Once                          Orders Placed This Encounter   Procedures    INPATIENT ADMISSION     Standing Status:   Standing     Number of Occurrences:   1     Order Specific Question:   Level of Care     Answer:   Level 2 Stepdown / HOT [14]     Order Specific Question:   Estimated length of stay     Answer:   More than 2 Midnights     Order Specific Question:   Certification     Answer:   I certify that inpatient services are medically necessary for this patient for a duration of greater than two midnights. See H&P and MD Progress Notes for additional information about the patient's course of treatment.     ED Arrival Information       Expected   -    Arrival   5/23/2024 16:42    Acuity   Emergent              Means of arrival   Ambulance    Escorted by   [facility]    Service   Hospitalist    Admission type   Emergency              Arrival complaint   Medical problem             Chief Complaint   Patient presents with    Medical Problem     Pt to er via ems from [facility] with reports from the patient that she fell out of a faulty wheelchair 5 or more weeks ago, and no one at the facility checked on her. Staff states that they activated 911 today because the patient was requesting to be seen in the er. Primary nurse at facility states that patient is at her baseline        Initial Presentation: 88 y.o. female  to ED via EMS from nursing facility.    Admitted to inpatient with Dx: anemia/atrial fib with RVR/Cystitis.  Presented to ED with complaints about facility, patient states she fell out of a faulty wheelchair about 5 to 8  weeks ago and no one has checked on her.  Today insisted to go to ED.   Non complaint with medications.  Constipated.    PMHx:PAF and HTN.  On exam: grail.  Tachycardia.  Rhythm irregular.  Angry and agitated.  UA  innumerable bacteria.  Moderate leukocytes.  Na 132.   Bun 40. Creatinine 1.07.   wbc 4.57.  H&H 3.3/12.7.   INR 1.36.    Imaging shows distended gallbladder and possible wall thickening.  New left pleural effusion.   On ct head sinusitis.    Ecg rapid atrial fib, rate 155.     ED treatment:  transfuse PRBC.  Given IV metoprolol.  Started on ceftriaxone.        Plan includes:  hold asa and eliquis as snf paperwork lists.   Transfuse PRBC.  Goal hemoglobin > 7.   Consult GI.  Clears.  Start Protonix.  Continue home metoprolol.   IV Lopressor as needed.  Hold lisinopril.     Continue ceftriaxone and follow cultures.  PT/OT.  Trend BMP.      Anticipated Length of Stay/Certification Statement: Patient will be admitted on an inpatient basis with an anticipated length of stay of greater than 2 midnights secondary to severe anemia, A-fib with RVR.     Date: 5/24/24    Day 2:  states had dark BM for awhile but not not pay attention.    on exam: elderly female.  Alert.  Tachycardia.  Rhythm irregular.  H&H 7.7/25.6.  INR 1.36. Iron panel showed severe iron deficiency anemia.  Eliquis and asa on hold.   Continue Protonix.   Monitor hgb.  Eventual egd and colonoscopy when heart rate improves.   Echo pending.   Start Venofer.  Continue metoprolol     5/24/24 per GI - iron deficiency anemia/atrial fib with RVR/abnormal ECG of ST depressions/Cystitis.    Hold Eliquis.   Will need IP egd and colonoscopy once stable.   Monitor H&H and transfuse as needed.  Continue Protonix.   IV iron.    Continue beta blocker, echo.  Consider cardiology consult.   Continue antibiotics and follow urine culture.       ED Triage Vitals   Temperature Pulse Respirations Blood Pressure SpO2   05/23/24 1722 05/23/24 1645 05/23/24 1645 05/23/24 1645 05/23/24 1645   97.8 °F (36.6 °C) (!) 152 18 99/60 95 %      Temp Source Heart Rate Source Patient Position - Orthostatic VS BP Location FiO2 (%)   05/23/24 1722 05/23/24 1645 05/23/24 1645 05/23/24 1645 --    Oral Monitor Lying Right arm       Pain Score       05/23/24 1930       No Pain          Wt Readings from Last 1 Encounters:   05/24/24 53 kg (116 lb 13.5 oz)     Additional Vital Signs:   05/24/24 1100 -- 99 16 120/71 91 96 % -- --   05/24/24 1045 98.6 °F (37 °C) -- -- 120/71 91 -- None (Room air) Sitting   05/24/24 0921 -- 91 17 -- -- 97 % None (Room air) --   05/24/24 0903 -- 154 Abnormal  30 Abnormal  108/81 91 98 % None (Room air) Sitting   05/24/24 0700 98.3 °F (36.8 °C) 102 15 132/75 99 96 % None (Room air) Lying   05/24/24 0447 98 °F (36.7 °C) 101 18 127/72 95 95 % None (Room air) Lying   05/23/24 2320 98.2 °F (36.8 °C) 96 18 147/84 108 93 % None (Room air) Lying   05/23/24 2200 -- 99 17 142/90 109 98 % -- --   05/23/24 2100 -- 124 Abnormal  18 133/111 Abnormal  117 88 % Abnormal  -- --   05/23/24 2000 -- 104 20 134/71 88 92 % None (Room air) --   05/23/24 1903 97.9 °F (36.6 °C) 103 18 103/57 -- -- -- --   05/23/24 1800 -- 147 Abnormal  20 119/68 80 93 % None (Room air) --   05/23/24 1722 97.8 °F (36.6 °C) -- -- -- -- -- -- --   05/23/24 1700 -- 149 Abnormal  20 109/64 81 97 % None (Room air)      Pertinent Labs/Diagnostic Test Results:   CT chest abdomen pelvis wo contrast   Final Result by Henry Martinez DO (05/23 1932)      1. Distended gallbladder with gallstone and possible wall thickening. Correlate clinically for right upper quadrant tenderness and if relevant ultrasound may be helpful to exclude acute cholecystitis.      2. New small left pleural effusion.      3. Mild circumferential bladder wall thickening, similar to previous study. No significant perivesical inflammatory changes.  If there is clinical concern for cystitis, correlate with urinalysis.      4. Visualization of the interventricular septum suggesting anemia.      5. Colonic diverticulosis without diverticulitis.      Additional incidental findings as above.      *Limited study without IV or oral contrast.         Workstation  performed: [id number]         CT head without contrast   Final Result by Henry Martinez DO (05/23 6349)      No acute intracranial abnormality.  Chronic microangiopathic changes.      Findings suggesting acute sinusitis.                  Workstation performed: [id number]         XR chest 1 view portable   Final Result by Sheridan Grier MD (05/24 2356)      Left base opacity corresponding with left pleural effusion and mild left lower lobe atelectasis on CT. Pneumonia not excluded in the appropriate clinical setting.               Workstation performed: [id number]           5/23/24 ecg -   Atrial fibrillation with rapid ventricular response  Low voltage QRS  ST & T wave abnormality, consider inferior ischemia  ST & T wave abnormality, consider anterolateral ischemia  Abnormal ECG  When compared with ECG of 16-DEC-2023 01:22,  No significant change was found  Atrial fibrillation with rapid ventricular response  Low voltage QRS  ST & T wave abnormality, consider inferior ischemia  ST & T wave abnormality, consider anterolateral ischemia  Abnormal ECG  When compared with ECG of 16-DEC-2023 01:22,  No significant change was found      5/23/24 ecg - Atrial fibrillation with rapid ventricular response  Low voltage QRS  ST & T wave abnormality, consider inferior ischemia  ST & T wave abnormality, consider anterolateral ischemia  Abnormal ECG  When compared with ECG of 23-MAY-2024 16:48, (unconfirmed)  No significant change was found  Atrial fibrillation with rapid ventricular response  Low voltage QRS  ST & T wave abnormality, consider inferior ischemia  ST & T wave abnormality, consider anterolateral ischemia  Abnormal ECG  When compared with ECG of 23-MAY-2024 16:48, (unconfirmed)  No significant change was found    5/24/24 echo -   Left Ventricle: Left ventricular cavity size is normal. Wall thickness is mildly increased. There is mild concentric hypertrophy. The left ventricular ejection fraction is  55%. Systolic function is normal. Wall motion is normal. Diastolic function is moderately abnormal, consistent with grade II (pseudonormal) relaxation.    Right Ventricle: Systolic function is mildly reduced.    Left Atrium: The atrium is mildly dilated (35-41 mL/m2).    Right Atrium: The atrium is mildly dilated.    Aortic Valve: There is mild regurgitation.    Mitral Valve: There is annular calcification. There is moderate regurgitation.    Tricuspid Valve: There is moderate to severe regurgitation.    Pulmonic Valve: There is mild regurgitation    Results from last 7 days   Lab Units 05/23/24  1657   SARS-COV-2  Negative     Results from last 7 days   Lab Units 05/24/24  0500 05/24/24  0026 05/23/24  1657   WBC Thousand/uL 6.59  --  4.57   HEMOGLOBIN g/dL 7.7* 7.0* 3.3*   HEMATOCRIT % 25.6* 23.3* 12.7*   PLATELETS Thousands/uL 309  --  398*   TOTAL NEUT ABS Thousands/µL  --   --  3.00     Results from last 7 days   Lab Units 05/24/24  0500 05/23/24  1657   SODIUM mmol/L 134* 132*   POTASSIUM mmol/L 3.9 4.2   CHLORIDE mmol/L 106 103   CO2 mmol/L 18* 20*   ANION GAP mmol/L 10 9   BUN mg/dL 31* 40*   CREATININE mg/dL 0.87 1.07   EGFR ml/min/1.73sq m 60 46   CALCIUM mg/dL 9.1 9.3   MAGNESIUM mg/dL 2.3  --    PHOSPHORUS mg/dL 3.2  --      Results from last 7 days   Lab Units 05/24/24  0500 05/23/24  1657   AST U/L 15 14   ALT U/L 7 7   ALK PHOS U/L 42 38   TOTAL PROTEIN g/dL 5.9* 6.1*   ALBUMIN g/dL 3.7 3.8   TOTAL BILIRUBIN mg/dL 1.76* 0.52     Results from last 7 days   Lab Units 05/24/24  0500 05/23/24  1657   GLUCOSE RANDOM mg/dL 88 109     Results from last 7 days   Lab Units 05/23/24  1657   PROTIME seconds 17.2*   INR  1.36*   PTT seconds 27     Results from last 7 days   Lab Units 05/23/24  1657   PROCALCITONIN ng/ml 0.12     Results from last 7 days   Lab Units 05/23/24  1657   LACTIC ACID mmol/L 1.5     Results from last 7 days   Lab Units 05/23/24  1657   FERRITIN ng/mL 2*   IRON ug/dL <10*     Results from  last 7 days   Lab Units 05/24/24  0549 05/23/24  2132   UNIT PRODUCT CODE  R3962F52  [id number]   UNIT NUMBER  [id number]  [id number]   UNITABO  B  B B   UNITRH  POS  POS POS   CROSSMATCH  Compatible  Compatible Compatible   UNIT DISPENSE STATUS  Presumed Trans  Presumed Trans Crossmatched   UNIT PRODUCT VOL mL 350  350 350     Results from last 7 days   Lab Units 05/23/24  1905   CLARITY UA  Slightly Cloudy   COLOR UA  Yellow   SPEC GRAV UA  1.020   PH UA  6.5   GLUCOSE UA mg/dl Negative   KETONES UA mg/dl Trace*   BLOOD UA  Negative   PROTEIN UA mg/dl 30 (1+)*   NITRITE UA  Negative   BILIRUBIN UA  Negative   UROBILINOGEN UA (BE) mg/dl <2.0   LEUKOCYTES UA  Moderate*   WBC UA /hpf 10-20*   RBC UA /hpf 0-1   BACTERIA UA /hpf Innumerable*   EPITHELIAL CELLS WET PREP /hpf Occasional     Results from last 7 days   Lab Units 05/23/24  1657   INFLUENZA A PCR  Negative   INFLUENZA B PCR  Negative   RSV PCR  Negative     Results from last 7 days   Lab Units 05/23/24  1657   BLOOD CULTURE  Received in Microbiology Lab. Culture in Progress.  Received in Microbiology Lab. Culture in Progress.       ED Treatment:   Medication Administration from 05/23/2024 1642 to 05/23/2024 2039         Date/Time Order Dose Route Action Comments     05/23/2024 1702 EDT sodium chloride 0.9 % bolus 1,000 mL 1,000 mL Intravenous New Bag --     05/23/2024 1841 EDT metoprolol (LOPRESSOR) injection 5 mg 5 mg Intravenous Given --     05/23/2024 1952 EDT cefTRIAXone (ROCEPHIN) IVPB (premix in dextrose) 1,000 mg 50 mL 1,000 mg Intravenous New Bag --          Past Medical History:   Diagnosis Date    Allergic rhinitis     Arthritis     Back pain     Colon polyps     GERD (gastroesophageal reflux disease)     Hyperlipidemia     Hypertension      Present on Admission:   Hypertension   Atrial fibrillation with RVR (HCC)      Admitting Diagnosis: UTI (urinary tract infection) [N39.0]  Anemia [D64.9]  Body aches  [R52]  Atrial fibrillation with rapid ventricular response (HCC) [I48.91]  Age/Sex: 88 y.o. female  Admission Orders:  Scheduled Medications:  cefTRIAXone, 1,000 mg, Intravenous, Q24H  iron sucrose, 200 mg, Intravenous, Daily  metoprolol tartrate, 12.5 mg, Oral, Q12H JITENDRA  pantoprazole, 40 mg, Intravenous, Q12H JITENDRA    Continuous IV Infusions: none      PRN Meds:  acetaminophen, 650 mg, Oral, Q6H PRN  metoprolol, 2.5 mg, Intravenous, Q6H PRN x 1 5/24  ondansetron, 4 mg, Intravenous, Q6H PRN    Cardio pulmonary monitoring  Bilateral SCDs  Telemetry     5/23/24 transfuse leukoreduced RBC - x 2     IP CONSULT TO GASTROENTEROLOGY      Network Utilization Review Department  ATTENTION: Please call with any questions or concerns to [phone] and carefully listen to the prompts so that you are directed to the right person. All voicemails are confidential.   For Discharge needs, contact Care Management DC Support Team at [phone] opt. 2  Send all requests for admission clinical reviews, approved or denied determinations and any other requests to dedicated fax number below belonging to the campus where the patient is receiving treatment. List of dedicated fax numbers for the Facilities:  FACILITY NAME UR FAX NUMBER   ADMISSION DENIALS (Administrative/Medical Necessity) [phone]   DISCHARGE SUPPORT TEAM (NETWORK) [phone]   PARENT CHILD HEALTH (Maternity/NICU/Pediatrics) [phone]   [facility] [phone]   [facility] [phone]   [facility] [phone]   [facility] [phone]   [facility] [phone]   [facility] [phone]   [facility] [phone]   [facility] [phone]   [facility] [phone]   Critical access hospital  [phone]   [facility] [phone]   [facility] [phone]

## 2024-05-24 NOTE — PROGRESS NOTES
"[facility]  Progress Note  Name: Amanda Gonzalez I  MRN: [id number]  Unit/Bed#: -01 SDU I Date of Admission: 5/23/2024   Date of Service: 5/24/2024 I Hospital Day: 1    Assessment & Plan   * Anemia  Assessment & Plan  Recent Labs     05/23/24  1657 05/24/24  0026 05/24/24  0500   HGB 3.3* 7.0* 7.7*      Hgb 3.3 - presented in Afib with RVR but BP stable - suspect slow GI bleed   Patient asymptomatic  On review of SNF paperwork-patient taking Eliquis 2.5 Mg BID and ASA 325mg q8h -hold both of these medications  S/P 2 unit   Iron panel showed severe iron deficiency anemia  Per patient's daughter, patient was taking aspirin until a few months ago and Tums multiple times during the day because she had abdominal pain.?  Gastritis/ulcer  Protonix 40 Mg IV twice daily started-continue  Monitor hemoglobin  Discussed with GI, will monitor hemoglobin and plan for EGD/colonoscopy when heart rate improves and will have echo resulted.  Started Venofer x 3 days    Hyponatremia  Assessment & Plan  Recent Labs     05/23/24  1657 05/24/24  0500   SODIUM 132* 134*       Sodium 132 on admission  Improved    Abnormal CT scan, gallbladder  Assessment & Plan  CT A/P: \"Distended gallbladder with gallstone and possible wall thickening.\"  Patient denies any abdominal pain, no RUQ tenderness on exam  No transaminitis or elevated alk phos  Hold on RUQ US at this time    Abnormal EKG  Assessment & Plan  EKG showing ST depressions inferior and anterolaterally, did improve slightly after rate control  CT also with small pericardial effusion-therefore will check echo  If echo changed from 12/2023, consult cardiology  Echo pending     Fall  Assessment & Plan  Reports a fall either 5 to 8 weeks ago with head strike from wheelchair, states that she was never evaluated since the fall  She denies any pain, but states that she has been primarily bedbound since fall  CT C/A/P without any evidence of trauma  Fall " precautions  PT OT    Other problems related to medical facilities and other health care  Assessment & Plan  Pt expresses that she does not wish to return to [facility] and would like to live elsewhere after hospitalization   CM consulted     Cystitis  Assessment & Plan  UA with 10-20 WBCs and innumerable bacteria  Patient denies any symptoms-discontinue antibiotics patient is asymptomatic.     Atrial fibrillation with RVR (HCC)  Assessment & Plan  History of A-fib -presented with a heart rate in the 140s  Reports that she has not been taking any of her medicine for the last few weeks  RC: Metoprolol tartate 12.5 Mg twice daily  AC: Eliquis 2.5 Mg twice daily  Hold home Eliquis in setting of severe anemia  Heart rate stable    Hypertension  Assessment & Plan  Home regimen: Metoprolol tartate 12.5mg BID and lisinopril 10 Mg daily  However patient noncompliant with medications  Continue metoprolol and monitor  Blood pressure stable           VTE Pharmacologic Prophylaxis: VTE Score: 3 Moderate Risk (Score 3-4) - Pharmacological DVT Prophylaxis Contraindicated. Sequential Compression Devices Ordered.    Mobility:   Basic Mobility Inpatient Raw Score: 12  JH-HLM Goal: 4: Move to chair/commode  JH-HLM Achieved: 4: Move to chair/commode  JH-HLM Goal NOT achieved. Continue with multidisciplinary rounding and encourage appropriate mobility to improve upon JH-HLM goals.    Patient Centered Rounds: I performed bedside rounds with nursing staff today.   Discussions with Specialists or Other Care Team Provider: cm, GI    Education and Discussions with Family / Patient: Updated  (daughter) via phone.    Total Time Spent on Date of Encounter in care of patient: 40 mins. This time was spent on one or more of the following: performing physical exam; counseling and coordination of care; obtaining or reviewing history; documenting in the medical record; reviewing/ordering tests, medications or procedures;  communicating with other healthcare professionals and discussing with patient's family/caregivers.    Current Length of Stay: 1 day(s)  Current Patient Status: Inpatient   Certification Statement: The patient will continue to require additional inpatient hospital stay due to gi bleed   Discharge Plan: Anticipate discharge in >72 hrs to prior assisted or independent living facility.    Code Status: Level 3 - DNAR and DNI    Subjective:     Denied any pain. Stated that she was having dark bm for a while but did not pay attention. Denied lightheadedness, shortness of breath, chest pain.    Objective:     Vitals:   Temp (24hrs), Av.1 °F (36.7 °C), Min:97.6 °F (36.4 °C), Max:98.6 °F (37 °C)    Temp:  [97.6 °F (36.4 °C)-98.6 °F (37 °C)] 98.6 °F (37 °C)  HR:  [] 99  Resp:  [15-30] 16  BP: ()/() 120/71  SpO2:  [88 %-99 %] 96 %  Body mass index is 19.44 kg/m².     Input and Output Summary (last 24 hours):     Intake/Output Summary (Last 24 hours) at 2024 1341  Last data filed at 2024 1130  Gross per 24 hour   Intake 1510 ml   Output 86 ml   Net 1424 ml       Physical Exam:   Physical Exam  Vitals and nursing note reviewed.   Constitutional:       General: She is not in acute distress.     Appearance: She is not diaphoretic.   HENT:      Head: Normocephalic.   Eyes:      General: No scleral icterus.        Right eye: No discharge.         Left eye: No discharge.   Cardiovascular:      Rate and Rhythm: Tachycardia present. Rhythm irregular.   Pulmonary:      Effort: Pulmonary effort is normal. No respiratory distress.      Breath sounds: Normal breath sounds. No wheezing, rhonchi or rales.   Abdominal:      General: There is no distension.      Palpations: Abdomen is soft.      Tenderness: There is no abdominal tenderness. There is no guarding or rebound.   Musculoskeletal:      Cervical back: Normal range of motion.      Right lower leg: No edema.      Left lower leg: No edema.   Skin:      General: Skin is warm.      Coloration: Skin is pale.   Neurological:      Mental Status: She is alert and oriented to person, place, and time.   Psychiatric:         Mood and Affect: Mood normal.         Behavior: Behavior normal.          Additional Data:     Labs:  Results from last 7 days   Lab Units 05/24/24  0500 05/24/24  0026 05/23/24  1657   WBC Thousand/uL 6.59  --  4.57   HEMOGLOBIN g/dL 7.7*   < > 3.3*   HEMATOCRIT % 25.6*   < > 12.7*   PLATELETS Thousands/uL 309  --  398*   SEGS PCT %  --   --  66   LYMPHO PCT %  --   --  22   MONO PCT %  --   --  10   EOS PCT %  --   --  1    < > = values in this interval not displayed.     Results from last 7 days   Lab Units 05/24/24  0500   SODIUM mmol/L 134*   POTASSIUM mmol/L 3.9   CHLORIDE mmol/L 106   CO2 mmol/L 18*   BUN mg/dL 31*   CREATININE mg/dL 0.87   ANION GAP mmol/L 10   CALCIUM mg/dL 9.1   ALBUMIN g/dL 3.7   TOTAL BILIRUBIN mg/dL 1.76*   ALK PHOS U/L 42   ALT U/L 7   AST U/L 15   GLUCOSE RANDOM mg/dL 88     Results from last 7 days   Lab Units 05/23/24  1657   INR  1.36*             Results from last 7 days   Lab Units 05/23/24  1657   LACTIC ACID mmol/L 1.5   PROCALCITONIN ng/ml 0.12       Lines/Drains:  Invasive Devices       Peripheral Intravenous Line  Duration             Peripheral IV 05/23/24 Right;Ventral (anterior) Forearm <1 day    Peripheral IV 05/24/24 Right;Ventral (anterior) Forearm <1 day                      Telemetry:  Telemetry Orders (From admission, onward)               24 Hour Telemetry Monitoring  Continuous x 24 Hours (Telem)        Question:  Reason for 24 Hour Telemetry  Answer:  Arrhythmias requiring acute medical intervention / PPM or ICD malfunction                     Telemetry Reviewed: Atrial fibrillation. HR averaging 110  Indication for Continued Telemetry Use: Arrthymias requiring medical therapy             Imaging: Reviewed radiology reports from this admission including: chest CT scan, abdominal/pelvic CT, and CT  head    Recent Cultures (last 7 days):   Results from last 7 days   Lab Units 05/23/24  1657   BLOOD CULTURE  Received in Microbiology Lab. Culture in Progress.  Received in Microbiology Lab. Culture in Progress.       Last 24 Hours Medication List:   Current Facility-Administered Medications   Medication Dose Route Frequency Provider Last Rate    acetaminophen  650 mg Oral Q6H PRN Patience Jimenez PA-C      iron sucrose  200 mg Intravenous Daily Camille Montes MD Stopped (05/24/24 1130)    metoprolol  2.5 mg Intravenous Q6H PRN Patience Jimenez PA-C      metoprolol tartrate  12.5 mg Oral Q12H JITENDRA Patience Jimenez PA-C      ondansetron  4 mg Intravenous Q6H PRN Patience Jimenez PA-C      pantoprazole  40 mg Intravenous Q12H JITENDRA Patience Jimenez PA-C          Today, Patient Was Seen By: Camille Montes MD    **Please Note: This note may have been constructed using a voice recognition system.**

## 2024-05-24 NOTE — ASSESSMENT & PLAN NOTE
EKG showing ST depressions inferior and anterolaterally, did improve slightly after rate control  CT also with small pericardial effusion-therefore will check echo  If echo changed from 12/2023, consult cardiology

## 2024-05-24 NOTE — H&P
[facility]  H&P  Name: Amanda Gonzalez 88 y.o. female I MRN: [id number]  Unit/Bed#: - SDU I Date of Admission: 5/23/2024   Date of Service: 5/23/2024 I Hospital Day: 0      Assessment & Plan   * Anemia  Assessment & Plan  Hgb 3.3 - presented in Afib with RVR but BP stable - suspect slow GI bleed   Patient asymptomatic  On review of SNF paperwork-patient taking Eliquis 2.5 Mg BID and ASA 325mg q8h -hold both of these medications  S/P 1U PRBCs in the ED, will give additional 1 unit and check H&H  Check iron panel, vitamin B12, and folic acid  Goal hemoglobin > 7  Echo from 12/2023 does show significant valvular disease-will monitor respiratory status closely in setting of transfusions  GI consulted due to concern for slow GI bleed  Clear liquid diet until midnight then n.p.o. sips with meds  Protonix 40 Mg IV twice daily started    Atrial fibrillation with RVR (HCC)  Assessment & Plan  History of A-fib -presented with a heart rate in the 140s  Reports that she has not been taking any of her medicine for the last few weeks  RC: Metoprolol tartate 12.5 Mg twice daily  AC: Eliquis 2.5 Mg twice daily  Hold home Eliquis in setting of severe anemia  Metoprolol ordered to start in a.m., PRN Lopressor IV ordered for overnight as do not want to eliminate compensatory mechanism for anemia     Cystitis  Assessment & Plan  UA with 10-20 WBCs and innumerable bacteria  Patient denies any symptoms  Continue ceftriaxone  Follow-up urine culture    Fall  Assessment & Plan  Reports a fall either 5 to 8 weeks ago with head strike from wheelchair, states that she was never evaluated since the fall  She denies any pain, but states that she has been primarily bedbound since fall  CT C/A/P without any evidence of trauma  Fall precautions  PT OT    Abnormal EKG  Assessment & Plan  EKG showing ST depressions inferior and anterolaterally, did improve slightly after rate control  CT also with small  "pericardial effusion-therefore will check echo  If echo changed from 12/2023, consult cardiology    Abnormal CT scan, gallbladder  Assessment & Plan  CT A/P: \"Distended gallbladder with gallstone and possible wall thickening.\"  Patient denies any abdominal pain, no RUQ tenderness on exam  No transaminitis, elevated T. bili, or elevated alk phos  Hold on RUQ US at this time    Hyponatremia  Assessment & Plan  Sodium 132 on admission  Suspect secondary to hypovolemia in setting of anemia  Trend BMP daily    Other problems related to medical facilities and other health care  Assessment & Plan  Pt expresses that she does not wish to return to [facility] and would like to live elsewhere after hospitalization   CM consulted     Hypertension  Assessment & Plan  Home regimen: Metoprolol tartate 12.5mg BID and lisinopril 10 Mg daily  Hold lisinopril in setting of severe anemia, continue metoprolol in a.m.           VTE Pharmacologic Prophylaxis: VTE Score: 3 Moderate Risk (Score 3-4) - Pharmacological DVT Prophylaxis Contraindicated. Sequential Compression Devices Ordered.  Code Status: Level 3 - DNAR and DNI   Discussion with family:  I attempted to call her daughter, however there was no answer.  Voicemail left..     Anticipated Length of Stay: Patient will be admitted on an inpatient basis with an anticipated length of stay of greater than 2 midnights secondary to severe anemia, A-fib with RVR.      Chief Complaint: \" I fell and no one checked on me after\"    History of Present Illness:  Amanda Gonzalez is a 88 y.o. female with a PMH of PAF and HTN who presents with complaints about her nursing facility.  Patient reports that she slid out of her wheelchair 5 to 8 weeks ago hitting her head.  Nursing assisted her back into bed with a Stacey lift.  She states that nobody ever checked on her since then.  She denies pain anywhere, but states that she has not been out of bed since her fall.  Denies lightheaded or " dizziness.  No chest pain or dyspnea.  Reports she is constipated with last bowel movement 2 weeks ago which was diarrhea.  No melena or hematochezia.  No abdominal pain, nausea, vomiting.  Denies any urinary symptoms. Reports that she has been throwing away all the pills the facility has been giving her.     Review of Systems:  Review of Systems   Constitutional:  Negative for chills and fever.   HENT:  Negative for congestion.    Respiratory:  Negative for cough and shortness of breath.    Cardiovascular:  Negative for chest pain and leg swelling.   Gastrointestinal:  Positive for constipation. Negative for abdominal pain, blood in stool, diarrhea, nausea and vomiting.   Genitourinary:  Negative for difficulty urinating and dysuria.   Musculoskeletal:  Positive for gait problem (primarily bedbound).   Neurological:  Negative for weakness and numbness.   All other systems reviewed and are negative.      Past Medical and Surgical History:   Past Medical History:   Diagnosis Date    Allergic rhinitis     Arthritis     Back pain     Colon polyps     GERD (gastroesophageal reflux disease)     Hyperlipidemia     Hypertension        Past Surgical History:   Procedure Laterality Date    DILATION AND CURETTAGE OF UTERUS      TONSILLECTOMY         Meds/Allergies:  Prior to Admission medications    Medication Sig Start Date End Date Taking? Authorizing Provider   apixaban (ELIQUIS) 2.5 mg Take 1 tablet (2.5 mg total) by mouth 2 (two) times a day 12/18/23 5/23/24 Yes Norma Singh PA-C   aspirin 325 mg tablet Take 325 mg by mouth every 8 (eight) hours   Yes Historical Provider, MD   Cholecalciferol (Vitamin D3) 50 MCG (2000 UT) TABS Take 2,000 Units by mouth daily   Yes Historical Provider, MD   folic acid (FOLVITE) 1 mg tablet Take 1 tablet (1 mg total) by mouth daily 12/20/23 5/23/24 Yes Norma Singh PA-C   lisinopril (ZESTRIL) 10 mg tablet Take 10 mg by mouth daily   Yes Historical Provider, MD    metoprolol tartrate (LOPRESSOR) 25 mg tablet Take 0.5 tablets (12.5 mg total) by mouth every 12 (twelve) hours 12/19/23 5/23/24 Yes Norma Singh PA-C   thiamine 100 MG tablet Take 1 tablet (100 mg total) by mouth daily 12/20/23 5/23/24 Yes Norma Singh PA-C   acetaminophen (TYLENOL) 500 mg tablet Take 500 mg by mouth every 8 (eight) hours as needed for mild pain    Historical Provider, MD   acetaminophen (TYLENOL) 650 mg suppository Insert 650 mg into the rectum every 4 (four) hours as needed for mild pain For temp > 100.4    Historical Provider, MD   Diapers & Supplies MISC USE ONE, CHANGE EVERY 4 HOURS AS NEEDED for urine incontinence size large 12/18/19   Leila Mcknight DO   Diclofenac Sodium (VOLTAREN) 1 % Apply 2 g topically 4 (four) times a day for 19 days  Patient not taking: Reported on 1/16/2024 12/19/23 1/7/24  Norma Singh PA-C   lidocaine (LIDODERM) 5 % Apply 1 patch topically over 12 hours daily Remove & Discard patch within 12 hours or as directed by MD 12/20/23   Norma Singh PA-C   Sanitary Napkins & Tampons (KOTEX MAXI OVERNITE) PADS USE ONE, CHANGE EVERY 4 HOURS AS NEEDED for urine incontinence 12/18/19   Leila Mcknight DO     I have reveiwed home medications using records provided by [facility].    Allergies:   Allergies   Allergen Reactions    Tramadol Headache       Social History:  Marital Status:    Occupation: retired  Patient Pre-hospital Living Situation: Long Term Care Facility [facility]  Patient Pre-hospital Level of Mobility: non-ambulatory/bed bound  Patient Pre-hospital Diet Restrictions: regular diet   Substance Use History:   Social History     Substance and Sexual Activity   Alcohol Use Yes    Alcohol/week: 5.0 standard drinks of alcohol    Types: 5 Shots of liquor per week    Comment: patient states she drinks out of the bottle daily      Social History     Tobacco Use   Smoking Status Former   Smokeless Tobacco  "Never   Tobacco Comments    quit 50 years ago     Social History     Substance and Sexual Activity   Drug Use No       Family History:  Family History   Problem Relation Age of Onset    Breast cancer Sister     Cancer Brother        Physical Exam:     Vitals:   Blood Pressure: 131/93 (05/23/24 2131)  Pulse: (!) 113 (05/23/24 2131)  Temperature: 97.8 °F (36.6 °C) (05/23/24 2131)  Temp Source: Oral (05/23/24 2040)  Respirations: 20 (05/23/24 2131)  Height: 5' 5\" (165.1 cm) (05/23/24 2040)  Weight - Scale: 52.5 kg (115 lb 11.9 oz) (05/23/24 2040)  SpO2: 93 % (05/23/24 2115)    Physical Exam  Vitals and nursing note reviewed.   Constitutional:       General: She is not in acute distress.     Appearance: Normal appearance. She is not ill-appearing.   HENT:      Head: Normocephalic.      Nose: Nose normal.      Mouth/Throat:      Mouth: Mucous membranes are dry.   Eyes:      Comments: Pale conjunctiva   Cardiovascular:      Rate and Rhythm: Normal rate and regular rhythm.      Pulses: Normal pulses.      Heart sounds: No murmur heard.  Pulmonary:      Effort: Pulmonary effort is normal. No respiratory distress.      Breath sounds: Normal breath sounds. No wheezing, rhonchi or rales.   Abdominal:      General: Abdomen is flat. Bowel sounds are normal. There is no distension.      Palpations: Abdomen is soft.      Tenderness: There is no abdominal tenderness. There is no guarding or rebound.   Musculoskeletal:         General: Normal range of motion.      Cervical back: Normal range of motion.      Right lower leg: No edema.      Left lower leg: No edema.   Skin:     General: Skin is warm and dry.      Coloration: Skin is pale.   Neurological:      Mental Status: She is alert.      Sensory: No sensory deficit.      Motor: No weakness.      Comments: Oriented to self, place, month, year, and president.          Additional Data:     Lab Results:  Results from last 7 days   Lab Units 05/23/24  1657   WBC Thousand/uL 4.57 "   HEMOGLOBIN g/dL 3.3*   HEMATOCRIT % 12.7*   PLATELETS Thousands/uL 398*   SEGS PCT % 66   LYMPHO PCT % 22   MONO PCT % 10   EOS PCT % 1     Results from last 7 days   Lab Units 05/23/24  1657   SODIUM mmol/L 132*   POTASSIUM mmol/L 4.2   CHLORIDE mmol/L 103   CO2 mmol/L 20*   BUN mg/dL 40*   CREATININE mg/dL 1.07   ANION GAP mmol/L 9   CALCIUM mg/dL 9.3   ALBUMIN g/dL 3.8   TOTAL BILIRUBIN mg/dL 0.52   ALK PHOS U/L 38   ALT U/L 7   AST U/L 14   GLUCOSE RANDOM mg/dL 109     Results from last 7 days   Lab Units 05/23/24  1657   INR  1.36*             Results from last 7 days   Lab Units 05/23/24  1657   LACTIC ACID mmol/L 1.5   PROCALCITONIN ng/ml 0.12       Lines/Drains:  Invasive Devices       Peripheral Intravenous Line  Duration             Peripheral IV 05/23/24 Right Antecubital <1 day    Peripheral IV 05/23/24 Right;Ventral (anterior) Forearm <1 day                        Imaging: Reviewed radiology reports from this admission including: chest CT scan and abdominal/pelvic CT  CT chest abdomen pelvis wo contrast   Final Result by Henry Martinez DO (05/23 1932)      1. Distended gallbladder with gallstone and possible wall thickening. Correlate clinically for right upper quadrant tenderness and if relevant ultrasound may be helpful to exclude acute cholecystitis.      2. New small left pleural effusion.      3. Mild circumferential bladder wall thickening, similar to previous study. No significant perivesical inflammatory changes.  If there is clinical concern for cystitis, correlate with urinalysis.      4. Visualization of the interventricular septum suggesting anemia.      5. Colonic diverticulosis without diverticulitis.      Additional incidental findings as above.      *Limited study without IV or oral contrast.         Workstation performed: [id number]         CT head without contrast   Final Result by Henry Martinez DO (05/23 1859)      No acute intracranial abnormality.  Chronic  microangiopathic changes.      Findings suggesting acute sinusitis.                  Workstation performed: [id number]         XR chest 1 view portable    (Results Pending)       EKG and Other Studies Reviewed on Admission:   EKG:  A-fib with RVR with heart rate in the 140s, diffuse ST depressions.  Repeat after rate control showing A-fib with heart rate 106 bpm.  Improved though still present ST depressions.  Normal QTc..    ** Please Note: This note has been constructed using a voice recognition system. **

## 2024-05-24 NOTE — ASSESSMENT & PLAN NOTE
Home regimen: Metoprolol tartate 12.5mg BID and lisinopril 10 Mg daily  However patient noncompliant with medications  Continue metoprolol and monitor  Blood pressure stable

## 2024-05-24 NOTE — ASSESSMENT & PLAN NOTE
EKG showing ST depressions inferior and anterolaterally, did improve slightly after rate control  CT also with small pericardial effusion-therefore will check echo  If echo changed from 12/2023, consult cardiology  Echo pending

## 2024-05-24 NOTE — ASSESSMENT & PLAN NOTE
Pt expresses that she does not wish to return to [facility] and would like to live elsewhere after hospitalization   CM consulted

## 2024-05-24 NOTE — UTILIZATION REVIEW
NOTIFICATION OF INPATIENT ADMISSION   AUTHORIZATION REQUEST   SERVICING FACILITY:   [place] [address]  Tax ID: [id number]  NPI: [id number] ATTENDING PROVIDER:  Attending Name and NPI#: Camille Montes Md [[id number]]  Address: [address] [place] [address]  Phone: [phone]   ADMISSION INFORMATION:  Place of Service: Inpatient [facility]  Place of Service Code: 21  Inpatient Admission Date/Time: 5/23/24  7:51 PM  Discharge Date/Time: No discharge date for patient encounter.  Admitting Diagnosis Code/Description:  UTI (urinary tract infection) [N39.0]  Anemia [D64.9]  Body aches [R52]  Atrial fibrillation with rapid ventricular response (HCC) [I48.91]     UTILIZATION REVIEW CONTACT:  Marilou Adair, Utilization   Network Utilization Review Department  Phone: [phone]  Fax: [phone]  Email: [email or web address]  Contact for approvals/pending authorizations, clinical reviews, and discharge.     PHYSICIAN ADVISORY SERVICES:  Medical Necessity Denial & Ridt-kd-Wxdk Review  Phone: [phone]  Fax: [phone]  Email: [email or web address]     DISCHARGE SUPPORT TEAM:  For Patients Discharge Needs & Updates  Phone: [phone] opt. 2 Fax: [phone]  Email: [email or web address]

## 2024-05-24 NOTE — ASSESSMENT & PLAN NOTE
Recent Labs     05/23/24  1657 05/24/24  0026 05/24/24  0500   HGB 3.3* 7.0* 7.7*      Hgb 3.3 - presented in Afib with RVR but BP stable - suspect slow GI bleed   Patient asymptomatic  On review of SNF paperwork-patient taking Eliquis 2.5 Mg BID and ASA 325mg q8h -hold both of these medications  S/P 2 unit   Iron panel showed severe iron deficiency anemia  Per patient's daughter, patient was taking aspirin until a few months ago and Tums multiple times during the day because she had abdominal pain.?  Gastritis/ulcer  Protonix 40 Mg IV twice daily started-continue  Monitor hemoglobin  Discussed with GI, will monitor hemoglobin and plan for EGD/colonoscopy when heart rate improves and will have echo resulted.  Started Venofer x 3 days

## 2024-05-24 NOTE — ASSESSMENT & PLAN NOTE
Recent Labs     05/23/24  1657 05/24/24  0500   SODIUM 132* 134*       Sodium 132 on admission  Improved   Yes

## 2024-05-24 NOTE — OCCUPATIONAL THERAPY NOTE
Occupational Therapy Evaluation     Patient Name: Amanda Gonzalez  Today's Date: 5/24/2024  Problem List  Principal Problem:    Anemia  Active Problems:    Hypertension    Atrial fibrillation with RVR (HCC)    Cystitis    Other problems related to medical facilities and other health care    Fall    Abnormal EKG    Abnormal CT scan, gallbladder    Hyponatremia    Past Medical History  Past Medical History:   Diagnosis Date    Allergic rhinitis     Arthritis     Back pain     Colon polyps     GERD (gastroesophageal reflux disease)     Hyperlipidemia     Hypertension      Past Surgical History  Past Surgical History:   Procedure Laterality Date    DILATION AND CURETTAGE OF UTERUS      TONSILLECTOMY               05/24/24 0913   OT Last Visit   OT Visit Date 05/24/24   Note Type   Note type Evaluation   Pain Assessment   Pain Assessment Tool 0-10   Pain Score No Pain   Restrictions/Precautions   Weight Bearing Precautions Per Order No   Other Precautions Cognitive;Chair Alarm;Bed Alarm;Telemetry;Fall Risk   Home Living   Type of Home SNF  (LTC at [facility])   Home Equipment Wheelchair-manual   Additional Comments Spoke with RN at [facility]. Pt is Ax1 to txfer to WC without RW. RN reports pt can be noncompliant & txfer herself. Pt can typically propel herself   Prior Function   Level of Barto Needs assistance with ADLs;Needs assistance with IADLS;Modified independent with wheelchair   Lives With Facility staff   Receives Help From Personal care attendant   IADLs Family/Friend/Other provides transportation;Family/Friend/Other provides meals;Family/Friend/Other provides medication management   Falls in the last 6 months 1 to 4  (Per RN from [facility] - pt with (+) fall out of  on April 19th)   General   Family/Caregiver Present No   Subjective   Subjective Pt received supine in bed, pt irritiable but cooperative. Pt expressing frustrations with her nursing facility   ADL   Eating Assistance 7   Independent   Grooming Assistance 5  Supervision/Setup   UB Bathing Assistance 5  Supervision/Setup   LB Bathing Assistance 3  Moderate Assistance   UB Dressing Assistance 4  Minimal Assistance   LB Dressing Assistance 2  Maximal Assistance   Toileting Assistance  2  Maximal Assistance   Toileting Deficit Perineal hygiene;Bedside commode   Bed Mobility   Supine to Sit 5  Supervision   Additional items Increased time required;Bedrails;Verbal cues   Additional Comments Pt able to reposition hips forward to EOB with significant time & use of bed rails. Pt able to sit EOB with S   Transfers   Sit to Stand 2  Maximal assistance   Additional items Assist x 2;Increased time required;Verbal cues;Bedrails   Stand to Sit 2  Maximal assistance   Additional items Assist x 2;Increased time required;Verbal cues;Bedrails   Stand pivot 2  Maximal assistance   Additional items Assist x 2;Increased time required;Verbal cues;Armrests   Additional Comments 2 stand pivots with Max Ax2. Impaired sequencing & safety awareness. Significant difficulty advancing BLE & weight shifting   Balance   Static Sitting Fair   Dynamic Sitting Fair -   Static Standing Poor -   Dynamic Standing Poor -   Ambulatory Zero   Activity Tolerance   Activity Tolerance Patient limited by fatigue   Medical Staff Made Aware PT Faye   Nurse Made Aware RN Nereyda   RUE Assessment   RUE Assessment WFL   LUE Assessment   LUE Assessment WFL   Vision-Basic Assessment   Current Vision Wears glasses only for reading   Cognition   Overall Cognitive Status Impaired   Arousal/Participation Alert   Attention Attends with cues to redirect   Orientation Level Oriented X4   Memory Decreased recall of precautions;Decreased recall of recent events   Following Commands Follows one step commands with increased time or repetition   Assessment   Limitation Decreased ADL status;Decreased Safe judgement during ADL;Decreased cognition;Decreased endurance;Decreased self-care trans;Decreased  high-level ADLs;Mood limitation   Prognosis Fair   Assessment Pt is a 88 y.o. female seen for OT evaluation at [facility], admitted 5/23/2024 w/ fall x5 weeks ago. Upon admission, pt found to be anemic with HgB of 3.3. OT completed extensive review of pt's medical and social history. Comorbidities affecting pt's functional performance at time of assessment include: HTN, h/o falls, hyponatremia, cystitis, chronic pain syndrome, ambulatory dysfunciton. Personal factors affecting pt at time of IE include: behavioral pattern, difficulty performing ADLS, difficulty performing IADLS , limited insight into deficits, compliance, health management , and environment. Prior to admission, pt was living as a LTC resident at .  Pt was A w/  ADLS and A w/ IADLS, (-) drove, & required use of wheelchair  PTA. Upon evaluation: Pt requires S for bed mobility, Max Ax2 for functional transfers, Min A for UB ADLs and Max A for LB ADLS 2* the following deficits impacting occupational performance: weakness, decreased strength, decreased balance, decreased tolerance, impaired memory, impaired sequencing, impaired problem solving, decreased safety awareness, and impaired interpersonal skills. Full objective findings from OT assessment regarding body systems outlined above. Pt to benefit from continued skilled OT tx while in the hospital to address deficits as defined above and maximize level of functional independence w/ ADL's and functional mobility. Occupational Performance areas to address include: grooming, bathing/shower, toilet hygiene, dressing, functional mobility, and clothing management. Based on findings, pt is of high complexity. The patient's raw score on the AM-PAC Daily Activity inpatient short form is 16, standardized score is 35.96, less than 39.4. Patients at this level are likely to benefit from DC to post-acute rehabilitation services. However, please refer to therapist recommendation for discharge planning  given other factors that may influence destination. At this time, OT recommendations at time of discharge are DC with Level II - Moderate Rehab Resource Intensity resources.   Goals   Patient Goals Pt does not want to go back to her facility   Plan   Treatment Interventions ADL retraining;Functional transfer training;Endurance training;Patient/family training;Equipment evaluation/education;UE strengthening/ROM;Compensatory technique education;Continued evaluation;Activityengagement   Goal Expiration Date 06/03/24   OT Treatment Day 0   OT Frequency 3-5x/wk   Discharge Recommendation   Rehab Resource Intensity Level, OT II (Moderate Resource Intensity)   AM-PAC Daily Activity Inpatient   Lower Body Dressing 2   Bathing 2   Toileting 2   Upper Body Dressing 3   Grooming 3   Eating 4   Daily Activity Raw Score 16   Daily Activity Standardized Score (Calc for Raw Score >=11) 35.96   AM-PAC Applied Cognition Inpatient   Following a Speech/Presentation 2   Understanding Ordinary Conversation 4   Taking Medications 2   Remembering Where Things Are Placed or Put Away 3   Remembering List of 4-5 Errands 2   Taking Care of Complicated Tasks 2   Applied Cognition Raw Score 15   Applied Cognition Standardized Score 33.54   End of Consult   Education Provided Yes   Patient Position at End of Consult Bedside chair;Bed/Chair alarm activated;All needs within reach   Nurse Communication Nurse aware of consult     Pt will achieve the following goals within 10 days.    *Pt will complete UB bathing and dressing with S.    *Pt will complete LB bathing and dressing with Mod A & DME PRN.    *Pt will complete toileting w/ Mod A w/ G hygiene/thoroughness using DME PRN    *Pt will perform functional transfers with on/off all surfaces with Mod Ax1 using DME as needed w/ G balance/safety.    *Pt will improve functional mobility during ADL/IADL/leisure tasks to Mod I with WC.      *Pt will demonstrate G carryover of pt/caregiver education and  training as appropriate w/ Mod I w/o cues w/ good tolerance      Irena Campbell, OTR/L

## 2024-05-24 NOTE — ASSESSMENT & PLAN NOTE
"CT A/P: \"Distended gallbladder with gallstone and possible wall thickening.\"  Patient denies any abdominal pain, no RUQ tenderness on exam  No transaminitis or elevated alk phos  Hold on RUQ US at this time  "
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
## 2024-05-25 PROBLEM — E87.1 HYPONATREMIA: Status: RESOLVED | Noted: 2024-05-23 | Resolved: 2024-05-25

## 2024-05-25 PROBLEM — N30.90 CYSTITIS: Status: RESOLVED | Noted: 2024-05-23 | Resolved: 2024-05-25

## 2024-05-25 LAB
ALBUMIN SERPL BCP-MCNC: 3.1 G/DL (ref 3.5–5)
ALP SERPL-CCNC: 36 U/L (ref 34–104)
ALT SERPL W P-5'-P-CCNC: 7 U/L (ref 7–52)
ANION GAP SERPL CALCULATED.3IONS-SCNC: 7 MMOL/L (ref 4–13)
AST SERPL W P-5'-P-CCNC: 13 U/L (ref 13–39)
BILIRUB SERPL-MCNC: 0.81 MG/DL (ref 0.2–1)
BUN SERPL-MCNC: 20 MG/DL (ref 5–25)
CALCIUM ALBUM COR SERPL-MCNC: 9.3 MG/DL (ref 8.3–10.1)
CALCIUM SERPL-MCNC: 8.6 MG/DL (ref 8.4–10.2)
CHLORIDE SERPL-SCNC: 109 MMOL/L (ref 96–108)
CO2 SERPL-SCNC: 19 MMOL/L (ref 21–32)
CREAT SERPL-MCNC: 0.72 MG/DL (ref 0.6–1.3)
ERYTHROCYTE [DISTWIDTH] IN BLOOD BY AUTOMATED COUNT: 16.9 % (ref 11.6–15.1)
GFR SERPL CREATININE-BSD FRML MDRD: 75 ML/MIN/1.73SQ M
GLUCOSE SERPL-MCNC: 89 MG/DL (ref 65–140)
HCT VFR BLD AUTO: 22.8 % (ref 34.8–46.1)
HGB BLD-MCNC: 6.8 G/DL (ref 11.5–15.4)
MCH RBC QN AUTO: 21.5 PG (ref 26.8–34.3)
MCHC RBC AUTO-ENTMCNC: 29.8 G/DL (ref 31.4–37.4)
MCV RBC AUTO: 72 FL (ref 82–98)
MRSA NOSE QL CULT: NORMAL
PLATELET # BLD AUTO: 291 THOUSANDS/UL (ref 149–390)
PMV BLD AUTO: 9.8 FL (ref 8.9–12.7)
POTASSIUM SERPL-SCNC: 3.7 MMOL/L (ref 3.5–5.3)
PROT SERPL-MCNC: 4.8 G/DL (ref 6.4–8.4)
RBC # BLD AUTO: 3.17 MILLION/UL (ref 3.81–5.12)
SODIUM SERPL-SCNC: 135 MMOL/L (ref 135–147)
WBC # BLD AUTO: 5.92 THOUSAND/UL (ref 4.31–10.16)

## 2024-05-25 PROCEDURE — C9113 INJ PANTOPRAZOLE SODIUM, VIA: HCPCS | Performed by: PHYSICIAN ASSISTANT

## 2024-05-25 PROCEDURE — P9016 RBC LEUKOCYTES REDUCED: HCPCS

## 2024-05-25 PROCEDURE — 99232 SBSQ HOSP IP/OBS MODERATE 35: CPT | Performed by: INTERNAL MEDICINE

## 2024-05-25 PROCEDURE — 80053 COMPREHEN METABOLIC PANEL: CPT | Performed by: INTERNAL MEDICINE

## 2024-05-25 PROCEDURE — 99233 SBSQ HOSP IP/OBS HIGH 50: CPT | Performed by: INTERNAL MEDICINE

## 2024-05-25 PROCEDURE — 85027 COMPLETE CBC AUTOMATED: CPT | Performed by: INTERNAL MEDICINE

## 2024-05-25 PROCEDURE — 30233N1 TRANSFUSION OF NONAUTOLOGOUS RED BLOOD CELLS INTO PERIPHERAL VEIN, PERCUTANEOUS APPROACH: ICD-10-PCS | Performed by: INTERNAL MEDICINE

## 2024-05-25 PROCEDURE — C9113 INJ PANTOPRAZOLE SODIUM, VIA: HCPCS | Performed by: INTERNAL MEDICINE

## 2024-05-25 RX ADMIN — PANTOPRAZOLE SODIUM 40 MG: 40 INJECTION, POWDER, FOR SOLUTION INTRAVENOUS at 08:00

## 2024-05-25 RX ADMIN — POLYETHYLENE GLYCOL 3350, SODIUM SULFATE ANHYDROUS, SODIUM BICARBONATE, SODIUM CHLORIDE, POTASSIUM CHLORIDE 4000 ML: 236; 22.74; 6.74; 5.86; 2.97 POWDER, FOR SOLUTION ORAL at 15:03

## 2024-05-25 RX ADMIN — IRON SUCROSE 200 MG: 20 INJECTION, SOLUTION INTRAVENOUS at 08:00

## 2024-05-25 RX ADMIN — Medication 12.5 MG: at 08:00

## 2024-05-25 RX ADMIN — Medication 12.5 MG: at 20:51

## 2024-05-25 RX ADMIN — PANTOPRAZOLE SODIUM 40 MG: 40 INJECTION, POWDER, FOR SOLUTION INTRAVENOUS at 20:51

## 2024-05-25 NOTE — UTILIZATION REVIEW
NOTIFICATION OF INPATIENT ADMISSION   AUTHORIZATION REQUEST   SERVICING FACILITY:   [place] [address]  Tax ID: [id number]  NPI: [id number] ATTENDING PROVIDER:  Attending Name and NPI#: Camille Montes Md [[id number]]  Address: [address] [place] [address]  Phone: [phone]   ADMISSION INFORMATION:  Place of Service: Inpatient [facility]  Place of Service Code: 21  Inpatient Admission Date/Time: 5/23/24  7:51 PM  Discharge Date/Time: No discharge date for patient encounter.  Admitting Diagnosis Code/Description:  UTI (urinary tract infection) [N39.0]  Anemia [D64.9]  Body aches [R52]  Atrial fibrillation with rapid ventricular response (HCC) [I48.91]     UTILIZATION REVIEW CONTACT:  Marilou Adair, Utilization   Network Utilization Review Department  Phone: [phone]  Fax: [phone]  Email: [email or web address]  Contact for approvals/pending authorizations, clinical reviews, and discharge.     PHYSICIAN ADVISORY SERVICES:  Medical Necessity Denial & [place]aw[place]ew-[employer]ser Review  Phone: [phone]  Fax: [phone]  Email: [email or web address]     DISCHARGE SUPPORT TEAM:  For Patients Discharge Needs & Updates  Phone: [phone] opt. 2 Fax: [phone]  Email: [email or web address]

## 2024-05-25 NOTE — ASSESSMENT & PLAN NOTE
"CT A/P: \"Distended gallbladder with gallstone and possible wall thickening.\"  Patient denies any abdominal pain, no RUQ tenderness on exam  No transaminitis , bilirubin or elevated alk phos  Hold on RUQ US at this time  "

## 2024-05-25 NOTE — CASE MANAGEMENT
Case Management Progress Note    Patient name Amanda Gonzalez  Location  SDU/-01 S* MRN [id number]  : 1935 Date 2024       LOS (days): 2  Geometric Mean LOS (GMLOS) (days): 3  Days to GMLOS:1.5        OBJECTIVE:        Current admission status: Inpatient  Preferred Pharmacy:   [facility] [address] [address]  Phone: [phone] Fax: [phone]    [facility] [place] [place] - [address] [facility]  [address] [facility] [address]  Phone: [phone] Fax: [phone]    Primary Care Provider: Leila Mcknight DO    Primary Insurance: [employer] REP  Secondary Insurance: [facility]    PROGRESS NOTE:    Kenneth Ramos in admissions at [facility], pt is a LTC resident. Pt requires min to mod assistance with ADL's.   CM reserved [facility] in [place].

## 2024-05-25 NOTE — ASSESSMENT & PLAN NOTE
History of A-fib -presented with a heart rate in the 140s  Reports that she has not been taking any of her medicine for the last few weeks  RC: Metoprolol tartate 12.5 Mg twice daily  AC: Eliquis 2.5 Mg twice daily  Hold home Eliquis in setting of severe anemia  Heart rate improved, continue metoprolol 12.5 daily.  Do not increase metoprolol further as patient has compensatory mechanism for severe anemia

## 2024-05-25 NOTE — ASSESSMENT & PLAN NOTE
Recent Labs     05/24/24  0500 05/24/24  1730 05/25/24  0502   HGB 7.7* 7.3* 6.8*        Hgb 3.3 - presented in Afib with RVR but BP stable - suspect slow GI bleed   Patient asymptomatic  On review of SNF paperwork-patient taking Eliquis 2.5 Mg BID and ASA 325mg q8h -hold both of these medications  S/P 2 unit   Iron panel showed severe iron deficiency anemia  Protonix 40 Mg IV twice daily started-continue  Started Venofer x 3 days  1 unit PRBC transfused today  Discussed with GI, pending timing for EGD/colonoscopy.  Monitor hemoglobin a.m.

## 2024-05-25 NOTE — PROGRESS NOTES
Progress note - [facility]   Amanda Gonzalez 88 y.o. female MRN: [id number]  Unit/Bed#: -01 Encounter: [id number]    ASSESSMENT and PLAN  88-year-old female with history of atrial fibrillation on Eliquis, hypertension, osteoarthritis, ambulatory dysfunction admitted with severe anemia and A-fib with RVR.      1) Iron deficiency anemia: Hemoglobin 3.3 on admission, down from 10.4 in December 2023. Iron studies severely low - ferritin 2, serum iron < 10, iron sat and TIBC unable to calculate. B12 and folate normal. No reports of overt GI bleeding. She does take Eliquis for history of A-fib, but reportedly has been throwing away pills at her nursing facility? Concern for chronic GI blood loss from upper or lower source such as peptic ulcer, AVM, large polyp, malignancy. No recent EGD or colonoscopy.     - 5/25: Hgb 6.7 today, down from 7.3 yesterday 5/24  - Plan for EGD/colonoscopy tomorrow 5/26  - Continue to hold Eliquis - last dose 5/23  - Continue pantoprazole IV 40 mg Q12, Venofer day #2/3  - Monitor H/H, transfuse for Hgb <7; total 3 units PRBCs this admission     2) Atrial fibrillation with RVR  3) Abnormal EKG  Present on admission. Currently on rate control with beta-blocker. EKG showing ST depressions.     - Eliquis on HOLD, last dose 5/23  - Stable to proceed with EGD/colonoscopy per hospitalist     4) Cystitis  - UA with 10-20 WBCs, innumerable bacteria  - Abx d/c 5/24 as patient remains asymptomatic  - Management as per internal medicine      SUBJECTIVE  Accompanied by self and history is obtained from self.    No acute GI complaints today. No BM today. Denies abdominal pain, N/V. Tolerating diet.      Review of Systems   Constitutional:  Positive for fatigue. Negative for appetite change, chills, fever and unexpected weight change.   HENT:  Negative for sore throat, trouble swallowing and voice change.    Respiratory:  Negative for cough and choking.    Cardiovascular:   Negative for chest pain and leg swelling.   Gastrointestinal:  Negative for abdominal distention, abdominal pain, anal bleeding, blood in stool, constipation, diarrhea, nausea, rectal pain and vomiting.   Skin:  Negative for pallor and rash.   Psychiatric/Behavioral:  Negative for confusion and sleep disturbance. The patient is not nervous/anxious.        Temp:  [97 °F (36.1 °C)-98.9 °F (37.2 °C)] 97.7 °F (36.5 °C)  HR:  [] 101  Resp:  [17-26] 18  BP: (109-127)/(62-78) 109/72     PHYSICAL EXAM  Physical Exam  Vitals and nursing note reviewed.   Constitutional:       General: She is not in acute distress.     Appearance: She is not toxic-appearing.   HENT:      Head: Normocephalic.      Mouth/Throat:      Mouth: Mucous membranes are moist.      Pharynx: Oropharynx is clear.   Eyes:      General: No scleral icterus.     Extraocular Movements: Extraocular movements intact.   Neck:      Trachea: Phonation normal.   Cardiovascular:      Rate and Rhythm: Rhythm irregularly irregular.      Heart sounds: No murmur heard.     No friction rub. No gallop.   Pulmonary:      Effort: Pulmonary effort is normal. No respiratory distress.      Breath sounds: No wheezing, rhonchi or rales.   Abdominal:      General: Bowel sounds are normal. There is no distension or abdominal bruit.      Palpations: Abdomen is soft. There is no hepatomegaly or splenomegaly.      Tenderness: There is no abdominal tenderness. There is no guarding or rebound.   Musculoskeletal:      Cervical back: Neck supple.      Comments: Moving all 4 extremities spontaneously   Skin:     General: Skin is warm and dry.      Capillary Refill: Capillary refill takes less than 2 seconds.      Coloration: Skin is not jaundiced or pale.   Neurological:      General: No focal deficit present.      Mental Status: She is alert.   Psychiatric:         Behavior: Behavior normal. Behavior is cooperative.         Thought Content: Thought content normal.           LABS &  STUDIES  Lab Results   Component Value Date    GLUCOSE 84 05/12/2015    CALCIUM 8.6 05/25/2024     05/12/2015    K 3.7 05/25/2024    CO2 19 (L) 05/25/2024     (H) 05/25/2024    BUN 20 05/25/2024    CREATININE 0.72 05/25/2024    CREATININE 0.87 05/24/2024    CREATININE 1.07 05/23/2024     Lab Results   Component Value Date    WBC 5.92 05/25/2024    WBC 6.59 05/24/2024    WBC 4.57 05/23/2024    HGB 6.8 (L) 05/25/2024    HGB 7.3 (L) 05/24/2024    HGB 7.7 (L) 05/24/2024    MCV 72 (L) 05/25/2024     05/25/2024     05/24/2024     (H) 05/23/2024     Lab Results   Component Value Date    ALT 7 05/25/2024    ALT 7 05/24/2024    ALT 7 05/23/2024    AST 13 05/25/2024    AST 15 05/24/2024    AST 14 05/23/2024    ALKPHOS 36 05/25/2024    ALKPHOS 42 05/24/2024    ALKPHOS 38 05/23/2024    TBILI 0.81 05/25/2024    TBILI 1.76 (H) 05/24/2024    TBILI 0.52 05/23/2024     Lab Results   Component Value Date    IRON <10 (L) 05/23/2024    TIBC  05/23/2024      Comment:      Unable to calculate.    FERRITIN 2 (L) 05/23/2024     Lab Results   Component Value Date    INR 1.36 (H) 05/23/2024       Echo complete w/ contrast if indicated    Result Date: 5/24/2024  Narrative:   Left Ventricle: Left ventricular cavity size is normal. Wall thickness is mildly increased. There is mild concentric hypertrophy. The left ventricular ejection fraction is 55%. Systolic function is normal. Wall motion is normal. Diastolic function is moderately abnormal, consistent with grade II (pseudonormal) relaxation.   Right Ventricle: Systolic function is mildly reduced.   Left Atrium: The atrium is mildly dilated (35-41 mL/m2).   Right Atrium: The atrium is mildly dilated.   Aortic Valve: There is mild regurgitation.   Mitral Valve: There is annular calcification. There is moderate regurgitation.   Tricuspid Valve: There is moderate to severe regurgitation.   Pulmonic Valve: There is mild regurgitation.     XR chest 1 view  portable    Result Date: 5/24/2024  Narrative: CHEST INDICATION:   Possible fever. COMPARISON: Chest CT 5/23/2024, CXR 1/23/2012. EXAM PERFORMED/VIEWS:  XR CHEST PORTABLE. FINDINGS: Left base opacity. No pneumothorax. Mild cardiomegaly. Bones normal for age. Upper abdomen normal.     Impression: Left base opacity corresponding with left pleural effusion and mild left lower lobe atelectasis on CT. Pneumonia not excluded in the appropriate clinical setting. Workstation performed: [id number]     CT chest abdomen pelvis wo contrast    Result Date: 5/23/2024  Narrative: CT CHEST, ABDOMEN AND PELVIS WITHOUT IV CONTRAST INDICATION: AMS. COMPARISON: CT abdomen pelvis 12/15/2023, chest radiograph 1/23/2012 and earlier this day TECHNIQUE: CT examination of the chest, abdomen and pelvis was performed without intravenous contrast. Multiplanar 2D reformatted images were created from the source data. This examination, like all CT scans performed in the [facility], was performed utilizing techniques to minimize radiation dose exposure, including the use of iterative reconstruction and automated exposure control. Radiation dose length product (DLP) for this visit: 582.83 mGy-cm Enteric Contrast: Not administered. FINDINGS: CHEST LUNGS: Scattered calcified granulomas in the lungs bilaterally. Minor dependent changes in the lower lobes. No consolidation. PLEURA: Small left pleural effusion, new from prior study. HEART/GREAT VESSELS: The heart is top normal size with left atrial dilatation suggested. Coronary artery calcifications. Trace pericardial effusion. Atherosclerotic changes thoracic aorta without aneurysm. Visualization of the interventricular septum suggesting anemia. MEDIASTINUM AND RICK: Calcified mediastinal and right hilar lymph node in keeping with previous granulomatous disease. CHEST WALL AND LOWER NECK: Unremarkable. ABDOMEN Evaluation of the solid organs is limited without IV contrast. LIVER/BILIARY  TREE: Unremarkable. GALLBLADDER: The gallbladder appears distended. Gallstone is noted. Question gallbladder wall thickening albeit without significant pericholecystic inflammatory changes. SPLEEN: Multiple calcified granulomas. PANCREAS: Unremarkable. ADRENAL GLANDS: Mild, chronic thickening of the left adrenal gland. KIDNEYS/URETERS: Bilateral parapelvic cysts suspected. STOMACH AND BOWEL: Limited evaluation of the bowel without oral or IV contrast. Evaluation of the stomach is limited by underdistention. Small bowel is normal caliber. Scattered colonic diverticulosis without diverticulitis. APPENDIX: No findings to suggest appendicitis. ABDOMINOPELVIC CAVITY: Trace presacral edema. No pneumoperitoneum. Evaluation of lymphadenopathy is limited in the absence of intravenous contrast. No bulky adenopathy detected on this noncontrast study. VESSELS: Atherosclerotic changes abdominal aorta without evidence of aneurysm. PELVIS REPRODUCTIVE ORGANS: Uterus appears low lying which may reflect element of prolapse. There are tubular curvilinear cystic foci in the posterior pelvis. Assessment limited without IV contrast. Although I suspect these represent fluid-filled bowel loops, cannot completely exclude hydrosalpinx. URINARY BLADDER: Mild circumferential bladder wall thickening, similar to previous study. No significant perivesical inflammatory changes. ABDOMINAL WALL/INGUINAL REGIONS: Unremarkable. BONES: No acute fracture or suspicious osseous lesion. Multilevel degenerative change of the spine. Grade 1 spondylolisthesis L4 on L5 secondary to facet arthropathy. Degenerative changes of the pubic symphysis and right SI joint. Advanced left hip osteoarthritis. Severe osteoarthritis of the bilateral glenohumeral joints. High riding left humeral head may be indicative of chronic rotator cuff tear.     Impression: 1. Distended gallbladder with gallstone and possible wall thickening. Correlate clinically for right upper  quadrant tenderness and if relevant ultrasound may be helpful to exclude acute cholecystitis. 2. New small left pleural effusion. 3. Mild circumferential bladder wall thickening, similar to previous study. No significant perivesical inflammatory changes.  If there is clinical concern for cystitis, correlate with urinalysis. 4. Visualization of the interventricular septum suggesting anemia. 5. Colonic diverticulosis without diverticulitis. Additional incidental findings as above. *Limited study without IV or oral contrast. Workstation performed: [id number]     CT head without contrast    Result Date: 5/23/2024  Narrative: CT BRAIN - WITHOUT CONTRAST INDICATION:  AMS. COMPARISON: CT head 3/29/2009 TECHNIQUE:  CT examination of the brain was performed.  Multiplanar 2D reformatted images were created from the source data. Radiation dose length product (DLP) for this visit:  885.33 mGy-cm.  This examination, like all CT scans performed in the [facility], was performed utilizing techniques to minimize radiation dose exposure, including the use of iterative reconstruction and automated exposure control. IMAGE QUALITY:  Diagnostic. FINDINGS: PARENCHYMA:  No intracranial mass, mass effect or midline shift. No CT signs of acute infarction.  No acute parenchymal hemorrhage. Age-related cortical atrophy. Periventricular white matter hypodensity which is nonspecific although most compatible with chronic small vessel ischemic disease. Vascular and left basal ganglia calcifications. VENTRICLES AND EXTRA-AXIAL SPACES: Normal for the patient's age. VISUALIZED ORBITS: Normal visualized orbits. PARANASAL SINUSES: Debris and fluid within the left maxillary and sphenoid sinuses may be manifestation of acute sinusitis. The mastoid air cells are clear. CALVARIUM AND EXTRACRANIAL SOFT TISSUES:  Normal.     Impression: No acute intracranial abnormality.  Chronic microangiopathic changes. Findings suggesting acute  sinusitis. Workstation performed: [id number]       Patient expressed understanding and had all questions and concerns addressed.    Vanessa Florence PA-C   05/25/24  2:23 PM    This chart was completed in part utilizing PureWRX speech voice recognition software. Random word insertions, pronoun errors, and incomplete sentences are an occasional consequence of this system due to software limitations, and ambient noise. Any questions or concerns about the content, text, or information contained within the body of this dictation should be directly addressed to the provider for clarification.

## 2024-05-25 NOTE — ASSESSMENT & PLAN NOTE
Pt expresses that she does not wish to return to [facility] and would like to live elsewhere after hospitalization   CM consulted

## 2024-05-25 NOTE — ASSESSMENT & PLAN NOTE
EKG showing ST depressions inferior and anterolaterally, did improve slightly after rate control  Echo showed LVEF 55%, diastolic dysfunction 2, mild aortic regurgitation, moderate mitral regurgitation, moderate to severe tricuspid regurgitation, right ventricular systolic pressure 30, IVC normal.  No pericardial effusion.

## 2024-05-25 NOTE — UTILIZATION REVIEW
Initial Clinical Review - Continued    SEE INITIAL REVIEW AT BOTTOM    Date: 05/25/24                          Current Patient Class: IP  Current Level of Care: Med/Surg    HPI:88 y.o. female initially admitted on 5/23 for anemia.      Assessment/Plan: Pt without new complaints. On exam, tachycardic, irregular rhythm. Hgb 6.8. telemetry shows afib w/ rates in 100s. Plan: hold ASA/Eliquis; IV PPI, IV venofer, transfuse 1 unit pRBCs, trend H&H, PT/OT evals, continue other current meds, Trend labs, replete electrolytes as needed. Telemetry, continuous pulse ox, I&O.     Vital Signs:   Date/Time Temp Pulse Resp BP MAP (mmHg) SpO2 O2 Device   05/25/24 0800 97.6 °F (36.4 °C) 104 18 120/78 95 97 % None (Room air)   05/25/24 0510 -- 111 Abnormal  19 127/69 92 96 % --   05/24/24 2349 98 °F (36.7 °C) 89 25 Abnormal  120/69 90 97 % None (Room air)   05/24/24 2040 98.9 °F (37.2 °C) 108 Abnormal  19 123/73 92 94 % None (Room air)   05/24/24 2037 -- 96 -- 123/73 -- -- --   05/24/24 1800 -- 101 20 -- -- 96 % --   05/24/24 1630 -- 102 26 Abnormal  114/67 86 97 % None (Room air)   05/24/24 1525 -- 94 17 -- -- 95 % --   05/24/24 1425 -- 111 Abnormal  17 -- -- 97 % --   05/24/24 1352 -- 146 Abnormal   18 117/84 96 96 % --   05/24/24 1300 -- 90 13 -- -- 93 % --       Pertinent Labs/Diagnostic Results:   Results from last 7 days   Lab Units 05/23/24  1657   SARS-COV-2  Negative     Results from last 7 days   Lab Units 05/25/24  0502 05/24/24  1730 05/24/24  0500 05/24/24  0026 05/23/24  1657   WBC Thousand/uL 5.92  --  6.59  --  4.57   HEMOGLOBIN g/dL 6.8* 7.3* 7.7* 7.0* 3.3*   HEMATOCRIT % 22.8* 24.2* 25.6* 23.3* 12.7*   PLATELETS Thousands/uL 291  --  309  --  398*   TOTAL NEUT ABS Thousands/µL  --   --   --   --  3.00         Results from last 7 days   Lab Units 05/25/24  0502 05/24/24  0500 05/23/24  1657   SODIUM mmol/L 135 134* 132*   POTASSIUM mmol/L 3.7 3.9 4.2   CHLORIDE mmol/L 109* 106 103   CO2 mmol/L 19* 18* 20*   ANION GAP  mmol/L 7 10 9   BUN mg/dL 20 31* 40*   CREATININE mg/dL 0.72 0.87 1.07   EGFR ml/min/1.73sq m 75 60 46   CALCIUM mg/dL 8.6 9.1 9.3   MAGNESIUM mg/dL  --  2.3  --    PHOSPHORUS mg/dL  --  3.2  --      Results from last 7 days   Lab Units 05/25/24  0502 05/24/24  0500 05/23/24  1657   AST U/L 13 15 14   ALT U/L 7 7 7   ALK PHOS U/L 36 42 38   TOTAL PROTEIN g/dL 4.8* 5.9* 6.1*   ALBUMIN g/dL 3.1* 3.7 3.8   TOTAL BILIRUBIN mg/dL 0.81 1.76* 0.52         Results from last 7 days   Lab Units 05/25/24  0502 05/24/24  0500 05/23/24  1657   GLUCOSE RANDOM mg/dL 89 88 109     Results from last 7 days   Lab Units 05/23/24  1657   PROTIME seconds 17.2*   INR  1.36*   PTT seconds 27         Results from last 7 days   Lab Units 05/23/24  1657   PROCALCITONIN ng/ml 0.12     Results from last 7 days   Lab Units 05/23/24  1657   LACTIC ACID mmol/L 1.5     Results from last 7 days   Lab Units 05/23/24  1657   FERRITIN ng/mL 2*   IRON ug/dL <10*     Results from last 7 days   Lab Units 05/23/24  1905   CLARITY UA  Slightly Cloudy   COLOR UA  Yellow   SPEC GRAV UA  1.020   PH UA  6.5   GLUCOSE UA mg/dl Negative   KETONES UA mg/dl Trace*   BLOOD UA  Negative   PROTEIN UA mg/dl 30 (1+)*   NITRITE UA  Negative   BILIRUBIN UA  Negative   UROBILINOGEN UA (BE) mg/dl <2.0   LEUKOCYTES UA  Moderate*   WBC UA /hpf 10-20*   RBC UA /hpf 0-1   BACTERIA UA /hpf Innumerable*   EPITHELIAL CELLS WET PREP /hpf Occasional     Results from last 7 days   Lab Units 05/23/24  1657   INFLUENZA A PCR  Negative   INFLUENZA B PCR  Negative   RSV PCR  Negative     Results from last 7 days   Lab Units 05/23/24  1905 05/23/24  1657   BLOOD CULTURE   --  No Growth at 24 hrs.  No Growth at 24 hrs.   URINE CULTURE  Culture results to follow.  --        Medications:   Scheduled Medications:  iron sucrose, 200 mg, Intravenous, Daily  metoprolol tartrate, 12.5 mg, Oral, Q12H JITENDRA  pantoprazole, 40 mg, Intravenous, Q12H JITENDRA    Continuous IV Infusions: none    PRN  Meds:  acetaminophen, 650 mg, Oral, Q6H PRN; 5/24 x1  metoprolol, 2.5 mg, Intravenous, Q6H PRN  ondansetron, 4 mg, Intravenous, Q6H PRN          Network Utilization Review Department  ATTENTION: Please call with any questions or concerns to [phone] and carefully listen to the prompts so that you are directed to the right person. All voicemails are confidential.   For Discharge needs, contact Care Management DC Support Team at [phone] opt. 2  Send all requests for admission clinical reviews, approved or denied determinations and any other requests to dedicated fax number below belonging to the campus where the patient is receiving treatment. List of dedicated fax numbers for the Facilities:  FACILITY NAME UR FAX NUMBER   ADMISSION DENIALS (Administrative/Medical Necessity) [phone]   DISCHARGE SUPPORT TEAM (NETWORK) [phone]   PARENT CHILD HEALTH (Maternity/NICU/Pediatrics) [phone]   [facility] [phone]   [facility] [phone]   [facility] [phone]   [facility] [phone]   [facility] [phone]   [facility] [phone]   [facility] [phone]   [facility] [phone]   [facility] [phone]   [facility] [phone]   [facility] [phone]   [facility] [phone]

## 2024-05-25 NOTE — PROGRESS NOTES
"[facility]  Progress Note  Name: Amanda Gonzalez I  MRN: [id number]  Unit/Bed#: -01 I Date of Admission: 5/23/2024   Date of Service: 5/25/2024 I Hospital Day: 2    Assessment & Plan   * Anemia  Assessment & Plan  Recent Labs     05/24/24  0500 05/24/24  1730 05/25/24  0502   HGB 7.7* 7.3* 6.8*        Hgb 3.3 - presented in Afib with RVR but BP stable - suspect slow GI bleed   Patient asymptomatic  On review of SNF paperwork-patient taking Eliquis 2.5 Mg BID and ASA 325mg q8h -hold both of these medications  S/P 2 unit   Iron panel showed severe iron deficiency anemia  Protonix 40 Mg IV twice daily started-continue  Started Venofer x 3 days  1 unit PRBC transfused today  Discussed with GI, pending timing for EGD/colonoscopy.  Monitor hemoglobin a.m.    Abnormal CT scan, gallbladder  Assessment & Plan  CT A/P: \"Distended gallbladder with gallstone and possible wall thickening.\"  Patient denies any abdominal pain, no RUQ tenderness on exam  No transaminitis , bilirubin or elevated alk phos  Hold on RUQ US at this time    Abnormal EKG  Assessment & Plan  EKG showing ST depressions inferior and anterolaterally, did improve slightly after rate control  Echo showed LVEF 55%, diastolic dysfunction 2, mild aortic regurgitation, moderate mitral regurgitation, moderate to severe tricuspid regurgitation, right ventricular systolic pressure 30, IVC normal.  No pericardial effusion.      Fall  Assessment & Plan  Reports a fall either 5 to 8 weeks ago with head strike from wheelchair, states that she was never evaluated since the fall  She denies any pain, but states that she has been primarily bedbound since fall  CT C/A/P without any evidence of trauma  Fall precautions  PT OT    Other problems related to medical facilities and other health care  Assessment & Plan  Pt expresses that she does not wish to return to [facility] and would like to live elsewhere after hospitalization   CM " consulted     Atrial fibrillation with RVR ([facility])  Assessment & Plan  History of A-fib -presented with a heart rate in the 140s  Reports that she has not been taking any of her medicine for the last few weeks  RC: Metoprolol tartate 12.5 Mg twice daily  AC: Eliquis 2.5 Mg twice daily  Hold home Eliquis in setting of severe anemia  Heart rate improved, continue metoprolol 12.5 daily.  Do not increase metoprolol further as patient has compensatory mechanism for severe anemia    Hypertension  Assessment & Plan  Home regimen: Metoprolol tartate 12.5mg BID and lisinopril 10 Mg daily  However patient noncompliant with medications  Continue metoprolol and monitor  Blood pressure stable    Hyponatremia-resolved as of 5/25/2024  Assessment & Plan  Recent Labs     05/23/24  1657 05/24/24  0500 05/25/24  0502   SODIUM 132* 134* 135         Sodium 132 on admission  Improved    Cystitis-resolved as of 5/25/2024  Assessment & Plan  UA with 10-20 WBCs and innumerable bacteria  Patient denies any symptoms-discontinue antibiotics patient is asymptomatic.            VTE Pharmacologic Prophylaxis: VTE Score: 3 Moderate Risk (Score 3-4) - Pharmacological DVT Prophylaxis Contraindicated. Sequential Compression Devices Ordered.    Mobility:   Basic Mobility Inpatient Raw Score: 9  JH-HLM Goal: 3: Sit at edge of bed  JH-HLM Achieved: 2: Bed activities/Dependent transfer  JH-HLM Goal NOT achieved. Continue with multidisciplinary rounding and encourage appropriate mobility to improve upon JH-HLM goals.    Patient Centered Rounds: I performed bedside rounds with nursing staff today.   Discussions with Specialists or Other Care Team Provider: cm, gi    Education and Discussions with Family / Patient: Updated  (daughter) via phone.    Total Time Spent on Date of Encounter in care of patient: 38 mins. This time was spent on one or more of the following: performing physical exam; counseling and coordination of care; obtaining or  reviewing history; documenting in the medical record; reviewing/ordering tests, medications or procedures; communicating with other healthcare professionals and discussing with patient's family/caregivers.    Current Length of Stay: 2 day(s)  Current Patient Status: Inpatient   Certification Statement: The patient will continue to require additional inpatient hospital stay due to anemia  Discharge Plan: Anticipate discharge in >72 hrs to rehab facility.    Code Status: Level 3 - DNAR and DNI    Subjective:     No complaints.     Objective:     Vitals:   Temp (24hrs), Av.7 °F (36.5 °C), Min:97 °F (36.1 °C), Max:98.9 °F (37.2 °C)    Temp:  [97 °F (36.1 °C)-98.9 °F (37.2 °C)] 97.7 °F (36.5 °C)  HR:  [] 101  Resp:  [17-26] 18  BP: (109-127)/(62-78) 109/72  SpO2:  [94 %-100 %] 97 %  Body mass index is 19.81 kg/m².     Input and Output Summary (last 24 hours):     Intake/Output Summary (Last 24 hours) at 2024 1359  Last data filed at 2024 1336  Gross per 24 hour   Intake 1250 ml   Output 427 ml   Net 823 ml       Physical Exam:   Physical Exam  Vitals and nursing note reviewed.   Constitutional:       General: She is not in acute distress.     Appearance: She is not diaphoretic.   HENT:      Head: Normocephalic.   Eyes:      General: No scleral icterus.        Right eye: No discharge.         Left eye: No discharge.   Cardiovascular:      Rate and Rhythm: Tachycardia present. Rhythm irregular.   Pulmonary:      Effort: Pulmonary effort is normal. No respiratory distress.      Breath sounds: Normal breath sounds. No wheezing, rhonchi or rales.   Abdominal:      General: There is no distension.      Palpations: Abdomen is soft.      Tenderness: There is no abdominal tenderness. There is no guarding or rebound.   Musculoskeletal:      Cervical back: Normal range of motion.      Right lower leg: No edema.      Left lower leg: No edema.   Skin:     General: Skin is warm.   Neurological:      Mental Status:  She is alert and oriented to person, place, and time.   Psychiatric:         Mood and Affect: Mood normal.         Behavior: Behavior normal.          Additional Data:     Labs:  Results from last 7 days   Lab Units 05/25/24  0502 05/24/24  0026 05/23/24  1657   WBC Thousand/uL 5.92   < > 4.57   HEMOGLOBIN g/dL 6.8*   < > 3.3*   HEMATOCRIT % 22.8*   < > 12.7*   PLATELETS Thousands/uL 291   < > 398*   SEGS PCT %  --   --  66   LYMPHO PCT %  --   --  22   MONO PCT %  --   --  10   EOS PCT %  --   --  1    < > = values in this interval not displayed.     Results from last 7 days   Lab Units 05/25/24  0502   SODIUM mmol/L 135   POTASSIUM mmol/L 3.7   CHLORIDE mmol/L 109*   CO2 mmol/L 19*   BUN mg/dL 20   CREATININE mg/dL 0.72   ANION GAP mmol/L 7   CALCIUM mg/dL 8.6   ALBUMIN g/dL 3.1*   TOTAL BILIRUBIN mg/dL 0.81   ALK PHOS U/L 36   ALT U/L 7   AST U/L 13   GLUCOSE RANDOM mg/dL 89     Results from last 7 days   Lab Units 05/23/24  1657   INR  1.36*             Results from last 7 days   Lab Units 05/23/24  1657   LACTIC ACID mmol/L 1.5   PROCALCITONIN ng/ml 0.12       Lines/Drains:  Invasive Devices       Peripheral Intravenous Line  Duration             Peripheral IV 05/25/24 Dorsal (posterior);Right Forearm <1 day                      Telemetry:  Telemetry Orders (From admission, onward)               24 Hour Telemetry Monitoring  Continuous x 24 Hours (Telem)        Expiring   Question:  Reason for 24 Hour Telemetry  Answer:  Arrhythmias requiring acute medical intervention / PPM or ICD malfunction                     Telemetry Reviewed: Atrial fibrillation. HR averaging 100  Indication for Continued Telemetry Use: No indication for continued use. Will discontinue.              Imaging: Reviewed radiology reports from this admission including: ECHO    Recent Cultures (last 7 days):   Results from last 7 days   Lab Units 05/23/24  1905 05/23/24  1657   BLOOD CULTURE   --  No Growth at 24 hrs.  No Growth at 24 hrs.    URINE CULTURE  Culture results to follow.  --        Last 24 Hours Medication List:   Current Facility-Administered Medications   Medication Dose Route Frequency Provider Last Rate    acetaminophen  650 mg Oral Q6H PRN Camille Montes MD      iron sucrose  200 mg Intravenous Daily Camille Montes MD 0 mg (05/24/24 1130)    metoprolol  2.5 mg Intravenous Q6H PRN Camille Montes MD      metoprolol tartrate  12.5 mg Oral Q12H JITENDRA Camille Montes MD      ondansetron  4 mg Intravenous Q6H PRN Camille Montes MD      pantoprazole  40 mg Intravenous Q12H JITENDRA Montes MD          Today, Patient Was Seen By: Camille Montes MD    **Please Note: This note may have been constructed using a voice recognition system.**

## 2024-05-25 NOTE — ASSESSMENT & PLAN NOTE
Recent Labs     05/23/24  1657 05/24/24  0500 05/25/24  0502   SODIUM 132* 134* 135         Sodium 132 on admission  Improved

## 2024-05-25 NOTE — PLAN OF CARE
Problem: Potential for Falls  Goal: Patient will remain free of falls  Description: INTERVENTIONS:  - Educate patient/family on patient safety including physical limitations  - Instruct patient to call for assistance with activity   - Consult OT/PT to assist with strengthening/mobility   - Keep Call bell within reach  - Keep bed low and locked with side rails adjusted as appropriate  - Keep care items and personal belongings within reach  - Initiate and maintain comfort rounds  - Make Fall Risk Sign visible to staff  - Apply yellow socks and bracelet for high fall risk patients  - Consider moving patient to room near nurses station  Outcome: Progressing     Problem: PAIN - ADULT  Goal: Verbalizes/displays adequate comfort level or baseline comfort level  Description: Interventions:  - Encourage patient to monitor pain and request assistance  - Assess pain using appropriate pain scale  - Administer analgesics based on type and severity of pain and evaluate response  - Implement non-pharmacological measures as appropriate and evaluate response  - Consider cultural and social influences on pain and pain management  - Notify physician/advanced practitioner if interventions unsuccessful or patient reports new pain  Outcome: Progressing     Problem: INFECTION - ADULT  Goal: Absence or prevention of progression during hospitalization  Description: INTERVENTIONS:  - Assess and monitor for signs and symptoms of infection  - Monitor lab/diagnostic results  - Monitor all insertion sites, i.e. indwelling lines, tubes, and drains  - Monitor endotracheal if appropriate and nasal secretions for changes in amount and color  - [place] appropriate cooling/warming therapies per order  - Administer medications as ordered  - Instruct and encourage patient and family to use good hand hygiene technique  - Identify and instruct in appropriate isolation precautions for identified infection/condition  Outcome: Progressing  Goal: Absence  of fever/infection during neutropenic period  Description: INTERVENTIONS:  - Monitor WBC    Outcome: Progressing     Problem: SAFETY ADULT  Goal: Patient will remain free of falls  Description: INTERVENTIONS:  - Educate patient/family on patient safety including physical limitations  - Instruct patient to call for assistance with activity   - Consult OT/PT to assist with strengthening/mobility   - Keep Call bell within reach  - Keep bed low and locked with side rails adjusted as appropriate  - Keep care items and personal belongings within reach  - Initiate and maintain comfort rounds  - Make Fall Risk Sign visible to staff  - Apply yellow socks and bracelet for high fall risk patients  - Consider moving patient to room near nurses station  Outcome: Progressing  Goal: Maintain or return to baseline ADL function  Description: INTERVENTIONS:  -  Assess patient's ability to carry out ADLs; assess patient's baseline for ADL function and identify physical deficits which impact ability to perform ADLs (bathing, care of mouth/teeth, toileting, grooming, dressing, etc.)  - Assess/evaluate cause of self-care deficits   - Assess range of motion  - Assess patient's mobility; develop plan if impaired  - Assess patient's need for assistive devices and provide as appropriate  - Encourage maximum independence but intervene and supervise when necessary  - Involve family in performance of ADLs  - Assess for home care needs following discharge   - Consider OT consult to assist with ADL evaluation and planning for discharge  - Provide patient education as appropriate  Outcome: Progressing  Goal: Maintains/Returns to pre admission functional level  Description: INTERVENTIONS:  - Perform AM-PAC 6 Click Basic Mobility/ Daily Activity assessment daily.  - Set and communicate daily mobility goal to care team and patient/family/caregiver.   - Collaborate with rehabilitation services on mobility goals if consulted  - Out of bed for  toileting  - Record patient progress and toleration of activity level   Outcome: Progressing     Problem: DISCHARGE PLANNING  Goal: Discharge to home or other facility with appropriate resources  Description: INTERVENTIONS:  - Identify barriers to discharge w/patient and caregiver  - Arrange for needed discharge resources and transportation as appropriate  - Identify discharge learning needs (meds, wound care, etc.)  - Arrange for interpretive services to assist at discharge as needed  - Refer to Case Management Department for coordinating discharge planning if the patient needs post-hospital services based on physician/advanced practitioner order or complex needs related to functional status, cognitive ability, or social support system  Outcome: Progressing     Problem: Knowledge Deficit  Goal: Patient/family/caregiver demonstrates understanding of disease process, treatment plan, medications, and discharge instructions  Description: Complete learning assessment and assess knowledge base.  Interventions:  - Provide teaching at level of understanding  - Provide teaching via preferred learning methods  Outcome: Progressing     Problem: CARDIOVASCULAR - ADULT  Goal: Maintains optimal cardiac output and hemodynamic stability  Description: INTERVENTIONS:  - Monitor I/O, vital signs and rhythm  - Monitor for S/S and trends of decreased cardiac output  - Administer and titrate ordered vasoactive medications to optimize hemodynamic stability  - Assess quality of pulses, skin color and temperature  - Assess for signs of decreased coronary artery perfusion  - Instruct patient to report change in severity of symptoms  Outcome: Progressing  Goal: Absence of cardiac dysrhythmias or at baseline rhythm  Description: INTERVENTIONS:  - Continuous cardiac monitoring, vital signs, obtain 12 lead EKG if ordered  - Administer antiarrhythmic and heart rate control medications as ordered  - Monitor electrolytes and administer replacement  therapy as ordered  Outcome: Progressing     Problem: RESPIRATORY - ADULT  Goal: Achieves optimal ventilation and oxygenation  Description: INTERVENTIONS:  - Assess for changes in respiratory status  - Assess for changes in mentation and behavior  - Position to facilitate oxygenation and minimize respiratory effort  - Oxygen administered by appropriate delivery if ordered  - Initiate smoking cessation education as indicated  - Encourage broncho-pulmonary hygiene including cough, deep breathe, Incentive Spirometry  - Assess the need for suctioning and aspirate as needed  - Assess and instruct to report SOB or any respiratory difficulty  - Respiratory Therapy support as indicated  Outcome: Progressing     Problem: HEMATOLOGIC - ADULT  Goal: Maintains hematologic stability  Description: INTERVENTIONS  - Assess for signs and symptoms of bleeding or hemorrhage  - Monitor labs  - Administer supportive blood products/factors as ordered and appropriate  Outcome: Progressing     Problem: MUSCULOSKELETAL - ADULT  Goal: Maintain or return mobility to safest level of function  Description: INTERVENTIONS:  - Assess patient's ability to carry out ADLs; assess patient's baseline for ADL function and identify physical deficits which impact ability to perform ADLs (bathing, care of mouth/teeth, toileting, grooming, dressing, etc.)  - Assess/evaluate cause of self-care deficits   - Assess range of motion  - Assess patient's mobility  - Assess patient's need for assistive devices and provide as appropriate  - Encourage maximum independence but intervene and supervise when necessary  - Involve family in performance of ADLs  - Assess for home care needs following discharge   - Consider OT consult to assist with ADL evaluation and planning for discharge  - Provide patient education as appropriate  Outcome: Progressing  Goal: Maintain proper alignment of affected body part  Description: INTERVENTIONS:  - Support, maintain and protect limb  and body alignment  - Provide patient/ family with appropriate education  Outcome: Progressing     Problem: Prexisting or High Potential for Compromised Skin Integrity  Goal: Skin integrity is maintained or improved  Description: INTERVENTIONS:  - Identify patients at risk for skin breakdown  - Assess and monitor skin integrity  - Assess and monitor nutrition and hydration status  - Monitor labs   - Assess for incontinence   - Turn and reposition patient  - Assist with mobility/ambulation  - Relieve pressure over bony prominences  - Avoid friction and shearing  - Provide appropriate hygiene as needed including keeping skin clean and dry  - Evaluate need for skin moisturizer/barrier cream  - Collaborate with interdisciplinary team   - Patient/family teaching  - Consider wound care consult   Outcome: Progressing     Problem: Nutrition/Hydration-ADULT  Goal: Nutrient/Hydration intake appropriate for improving, restoring or maintaining nutritional needs  Description: Monitor and assess patient's nutrition/hydration status for malnutrition. Collaborate with interdisciplinary team and initiate plan and interventions as ordered.  Monitor patient's weight and dietary intake as ordered or per policy. Utilize nutrition screening tool and intervene as necessary. Determine patient's food preferences and provide high-protein, high-caloric foods as appropriate.     INTERVENTIONS:  - Monitor oral intake, urinary output, labs, and treatment plans  - Assess nutrition and hydration status and recommend course of action  - Evaluate amount of meals eaten  - Assist patient with eating if necessary   - Allow adequate time for meals  - Recommend/ encourage appropriate diets, oral nutritional supplements, and vitamin/mineral supplements  - Order, calculate, and assess calorie counts as needed  - Recommend, monitor, and adjust tube feedings and TPN/PPN based on assessed needs  - Assess need for intravenous fluids  - Provide specific  nutrition/hydration education as appropriate  - Include patient/family/caregiver in decisions related to nutrition  Outcome: Progressing

## 2024-05-26 ENCOUNTER — ANESTHESIA EVENT (INPATIENT)
Dept: PERIOP | Facility: HOSPITAL | Age: 89
DRG: 378 | End: 2024-05-26
Payer: COMMERCIAL

## 2024-05-26 ENCOUNTER — ANESTHESIA (INPATIENT)
Dept: PERIOP | Facility: HOSPITAL | Age: 89
DRG: 378 | End: 2024-05-26
Payer: COMMERCIAL

## 2024-05-26 ENCOUNTER — PREP FOR PROCEDURE (OUTPATIENT)
Dept: GASTROENTEROLOGY | Facility: CLINIC | Age: 89
End: 2024-05-26

## 2024-05-26 ENCOUNTER — ANESTHESIA EVENT (OUTPATIENT)
Dept: ANESTHESIOLOGY | Facility: HOSPITAL | Age: 89
End: 2024-05-26

## 2024-05-26 ENCOUNTER — APPOINTMENT (INPATIENT)
Dept: PERIOP | Facility: HOSPITAL | Age: 89
DRG: 378 | End: 2024-05-26
Payer: COMMERCIAL

## 2024-05-26 ENCOUNTER — ANESTHESIA (OUTPATIENT)
Dept: ANESTHESIOLOGY | Facility: HOSPITAL | Age: 89
End: 2024-05-26

## 2024-05-26 DIAGNOSIS — D50.0 IRON DEFICIENCY ANEMIA DUE TO CHRONIC BLOOD LOSS: Primary | ICD-10-CM

## 2024-05-26 LAB
ABO GROUP BLD BPU: NORMAL
ANION GAP SERPL CALCULATED.3IONS-SCNC: 6 MMOL/L (ref 4–13)
BACTERIA UR CULT: ABNORMAL
BACTERIA UR CULT: ABNORMAL
BPU ID: NORMAL
BUN SERPL-MCNC: 13 MG/DL (ref 5–25)
CALCIUM SERPL-MCNC: 8.7 MG/DL (ref 8.4–10.2)
CHLORIDE SERPL-SCNC: 107 MMOL/L (ref 96–108)
CO2 SERPL-SCNC: 23 MMOL/L (ref 21–32)
CREAT SERPL-MCNC: 0.68 MG/DL (ref 0.6–1.3)
CROSSMATCH: NORMAL
ERYTHROCYTE [DISTWIDTH] IN BLOOD BY AUTOMATED COUNT: 21.8 % (ref 11.6–15.1)
GFR SERPL CREATININE-BSD FRML MDRD: 78 ML/MIN/1.73SQ M
GLUCOSE SERPL-MCNC: 85 MG/DL (ref 65–140)
HCT VFR BLD AUTO: 29.3 % (ref 34.8–46.1)
HGB BLD-MCNC: 8.8 G/DL (ref 11.5–15.4)
MCH RBC QN AUTO: 23.2 PG (ref 26.8–34.3)
MCHC RBC AUTO-ENTMCNC: 30 G/DL (ref 31.4–37.4)
MCV RBC AUTO: 77 FL (ref 82–98)
PLATELET # BLD AUTO: 288 THOUSANDS/UL (ref 149–390)
PMV BLD AUTO: 9.4 FL (ref 8.9–12.7)
POTASSIUM SERPL-SCNC: 3.9 MMOL/L (ref 3.5–5.3)
RBC # BLD AUTO: 3.79 MILLION/UL (ref 3.81–5.12)
SODIUM SERPL-SCNC: 136 MMOL/L (ref 135–147)
UNIT DISPENSE STATUS: NORMAL
UNIT PRODUCT CODE: NORMAL
UNIT PRODUCT VOLUME: 350 ML
UNIT RH: NORMAL
WBC # BLD AUTO: 5.67 THOUSAND/UL (ref 4.31–10.16)

## 2024-05-26 PROCEDURE — 88341 IMHCHEM/IMCYTCHM EA ADD ANTB: CPT | Performed by: PATHOLOGY

## 2024-05-26 PROCEDURE — 0DB68ZX EXCISION OF STOMACH, VIA NATURAL OR ARTIFICIAL OPENING ENDOSCOPIC, DIAGNOSTIC: ICD-10-PCS | Performed by: INTERNAL MEDICINE

## 2024-05-26 PROCEDURE — C9113 INJ PANTOPRAZOLE SODIUM, VIA: HCPCS | Performed by: INTERNAL MEDICINE

## 2024-05-26 PROCEDURE — 88342 IMHCHEM/IMCYTCHM 1ST ANTB: CPT | Performed by: PATHOLOGY

## 2024-05-26 PROCEDURE — 0DJD8ZZ INSPECTION OF LOWER INTESTINAL TRACT, VIA NATURAL OR ARTIFICIAL OPENING ENDOSCOPIC: ICD-10-PCS | Performed by: INTERNAL MEDICINE

## 2024-05-26 PROCEDURE — NC001 PR NO CHARGE: Performed by: INTERNAL MEDICINE

## 2024-05-26 PROCEDURE — 43239 EGD BIOPSY SINGLE/MULTIPLE: CPT | Performed by: INTERNAL MEDICINE

## 2024-05-26 PROCEDURE — 88305 TISSUE EXAM BY PATHOLOGIST: CPT | Performed by: PATHOLOGY

## 2024-05-26 PROCEDURE — 99232 SBSQ HOSP IP/OBS MODERATE 35: CPT | Performed by: INTERNAL MEDICINE

## 2024-05-26 PROCEDURE — 80048 BASIC METABOLIC PNL TOTAL CA: CPT | Performed by: INTERNAL MEDICINE

## 2024-05-26 PROCEDURE — 45382 COLONOSCOPY W/CONTROL BLEED: CPT | Performed by: INTERNAL MEDICINE

## 2024-05-26 PROCEDURE — 0DJ08ZZ INSPECTION OF UPPER INTESTINAL TRACT, VIA NATURAL OR ARTIFICIAL OPENING ENDOSCOPIC: ICD-10-PCS | Performed by: INTERNAL MEDICINE

## 2024-05-26 PROCEDURE — 85027 COMPLETE CBC AUTOMATED: CPT | Performed by: INTERNAL MEDICINE

## 2024-05-26 RX ORDER — ESMOLOL HYDROCHLORIDE 10 MG/ML
INJECTION INTRAVENOUS AS NEEDED
Status: DISCONTINUED | OUTPATIENT
Start: 2024-05-26 | End: 2024-05-26

## 2024-05-26 RX ORDER — HEPARIN SODIUM 5000 [USP'U]/ML
5000 INJECTION, SOLUTION INTRAVENOUS; SUBCUTANEOUS EVERY 8 HOURS SCHEDULED
Status: DISCONTINUED | OUTPATIENT
Start: 2024-05-26 | End: 2024-05-27

## 2024-05-26 RX ORDER — PANTOPRAZOLE SODIUM 40 MG/1
40 TABLET, DELAYED RELEASE ORAL
Status: DISCONTINUED | OUTPATIENT
Start: 2024-05-27 | End: 2024-05-29 | Stop reason: HOSPADM

## 2024-05-26 RX ORDER — SODIUM CHLORIDE 9 MG/ML
INJECTION, SOLUTION INTRAVENOUS CONTINUOUS PRN
Status: DISCONTINUED | OUTPATIENT
Start: 2024-05-26 | End: 2024-05-26

## 2024-05-26 RX ORDER — LIDOCAINE HYDROCHLORIDE 20 MG/ML
INJECTION, SOLUTION EPIDURAL; INFILTRATION; INTRACAUDAL; PERINEURAL AS NEEDED
Status: DISCONTINUED | OUTPATIENT
Start: 2024-05-26 | End: 2024-05-26

## 2024-05-26 RX ORDER — FERROUS SULFATE 325(65) MG
325 TABLET ORAL
Status: DISCONTINUED | OUTPATIENT
Start: 2024-05-27 | End: 2024-05-29 | Stop reason: HOSPADM

## 2024-05-26 RX ORDER — PROPOFOL 10 MG/ML
INJECTION, EMULSION INTRAVENOUS AS NEEDED
Status: DISCONTINUED | OUTPATIENT
Start: 2024-05-26 | End: 2024-05-26

## 2024-05-26 RX ADMIN — HEPARIN SODIUM 5000 UNITS: 5000 INJECTION INTRAVENOUS; SUBCUTANEOUS at 16:12

## 2024-05-26 RX ADMIN — Medication 12.5 MG: at 22:42

## 2024-05-26 RX ADMIN — Medication 12.5 MG: at 08:00

## 2024-05-26 RX ADMIN — METOPROLOL TARTRATE 2.5 MG: 5 INJECTION INTRAVENOUS at 05:16

## 2024-05-26 RX ADMIN — ACETAMINOPHEN 650 MG: 325 TABLET, FILM COATED ORAL at 05:12

## 2024-05-26 RX ADMIN — IRON SUCROSE 200 MG: 20 INJECTION, SOLUTION INTRAVENOUS at 08:00

## 2024-05-26 RX ADMIN — LIDOCAINE HYDROCHLORIDE 65 MG: 20 INJECTION, SOLUTION EPIDURAL; INFILTRATION; INTRACAUDAL; PERINEURAL at 13:25

## 2024-05-26 RX ADMIN — LIDOCAINE HYDROCHLORIDE 35 MG: 20 INJECTION, SOLUTION EPIDURAL; INFILTRATION; INTRACAUDAL; PERINEURAL at 13:29

## 2024-05-26 RX ADMIN — METOPROLOL TARTRATE 2.5 MG: 5 INJECTION INTRAVENOUS at 18:03

## 2024-05-26 RX ADMIN — SODIUM CHLORIDE: 9 INJECTION, SOLUTION INTRAVENOUS at 13:18

## 2024-05-26 RX ADMIN — PROPOFOL 10 MG: 10 INJECTION, EMULSION INTRAVENOUS at 13:36

## 2024-05-26 RX ADMIN — ESMOLOL HYDROCHLORIDE 10 MG: 100 INJECTION, SOLUTION INTRAVENOUS at 13:41

## 2024-05-26 RX ADMIN — PROPOFOL 30 MG: 10 INJECTION, EMULSION INTRAVENOUS at 13:29

## 2024-05-26 RX ADMIN — ESMOLOL HYDROCHLORIDE 30 MG: 100 INJECTION, SOLUTION INTRAVENOUS at 13:31

## 2024-05-26 RX ADMIN — ACETAMINOPHEN 650 MG: 325 TABLET, FILM COATED ORAL at 18:03

## 2024-05-26 RX ADMIN — PANTOPRAZOLE SODIUM 40 MG: 40 INJECTION, POWDER, FOR SOLUTION INTRAVENOUS at 08:00

## 2024-05-26 RX ADMIN — HEPARIN SODIUM 5000 UNITS: 5000 INJECTION INTRAVENOUS; SUBCUTANEOUS at 22:42

## 2024-05-26 RX ADMIN — PROPOFOL 50 MG: 10 INJECTION, EMULSION INTRAVENOUS at 13:25

## 2024-05-26 NOTE — ANESTHESIA PREPROCEDURE EVALUATION
Procedure:  EGD    Relevant Problems   CARDIO   (+) Atrial fibrillation with RVR (HCC)   (+) Hyperlipidemia   (+) Hypertension   (+) Hypertensive urgency   (+) Paroxysmal atrial fibrillation ([facility]CC)      GI/HEPATIC   (+) GERD (gastroesophageal reflux disease)      HEMATOLOGY   (+) Anemia      MUSCULOSKELETAL   (+) Back pain   (+) Bilateral low back pain without sciatica   (+) Generalized osteoarthritis of multiple sites   (+) Lumbar spondylosis   (+) Primary osteoarthritis of both knees   (+) Primary osteoarthritis of left hip      NEURO/PSYCH   (+) Chronic pain syndrome        Physical Exam    Airway    Mallampati score: II  TM Distance: >3 FB  Neck ROM: limited     Dental       Cardiovascular      Pulmonary      Other Findings  post-pubertal.      Anesthesia Plan  ASA Score- 3     Anesthesia Type- IV sedation with anesthesia with ASA Monitors.         Additional Monitors:     Airway Plan:            Plan Factors-    Chart reviewed. EKG reviewed.  Existing labs reviewed. Patient summary reviewed.    Patient is not a current smoker.              Induction- intravenous.    Postoperative Plan-         Informed Consent- Anesthetic plan and risks discussed with patient.  I personally reviewed this patient with the CRNA. Discussed and agreed on the Anesthesia Plan with the CRNA..

## 2024-05-26 NOTE — ASSESSMENT & PLAN NOTE
Recent Labs     05/24/24  0500 05/25/24  0502 05/26/24  0314   SODIUM 134* 135 136         Sodium 132 on admission  Improved

## 2024-05-26 NOTE — ANESTHESIA POSTPROCEDURE EVALUATION
Post-Op Assessment Note    CV Status:  Stable  Pain Score: 0    Pain management: adequate       Mental Status:  Alert and awake   Hydration Status:  Euvolemic   PONV Controlled:  None   Airway Patency:  Patent     Post Op Vitals Reviewed: Yes    No anethesia notable event occurred.    Staff: Anesthesiologist, CRNA               /82 (05/26/24 1358)    Temp      Pulse 100 (05/26/24 1358)   Resp 12 (05/26/24 1358)    SpO2 98 % (05/26/24 1358)

## 2024-05-26 NOTE — ANESTHESIA PREPROCEDURE EVALUATION
Procedure:  COLONOSCOPY    Relevant Problems   CARDIO   (+) Atrial fibrillation with RVR (HCC)   (+) Hyperlipidemia   (+) Hypertension   (+) Hypertensive urgency   (+) Paroxysmal atrial fibrillation (HCC)      GI/HEPATIC   (+) GERD (gastroesophageal reflux disease)      HEMATOLOGY   (+) Anemia      MUSCULOSKELETAL   (+) Back pain   (+) Bilateral low back pain without sciatica   (+) Generalized osteoarthritis of multiple sites   (+) Lumbar spondylosis   (+) Primary osteoarthritis of both knees   (+) Primary osteoarthritis of left hip      NEURO/PSYCH   (+) Chronic pain syndrome        Physical Exam    Airway    Mallampati score: II  TM Distance: >3 FB  Neck ROM: limited     Dental       Cardiovascular      Pulmonary      Other Findings  post-pubertal.      Anesthesia Plan  ASA Score- 3     Anesthesia Type- IV sedation with anesthesia with ASA Monitors.         Additional Monitors:     Airway Plan:            Plan Factors-    Chart reviewed. EKG reviewed.  Existing labs reviewed. Patient summary reviewed.    Patient is not a current smoker.              Induction- intravenous.    Postoperative Plan-         Informed Consent- Anesthetic plan and risks discussed with patient.  I personally reviewed this patient with the CRNA. Discussed and agreed on the Anesthesia Plan with the CRNA..

## 2024-05-26 NOTE — ASSESSMENT & PLAN NOTE
Home regimen: Metoprolol tartate 12.5mg BID and lisinopril 10 Mg daily  However patient noncompliant with medications  Currently on metoprolol only. Continue metoprolol and monitor  Blood pressure stable

## 2024-05-26 NOTE — ASSESSMENT & PLAN NOTE
Recent Labs     05/24/24  1730 05/25/24  0502 05/26/24  0314   HGB 7.3* 6.8* 8.8*        Hgb 3.3 - presented in Afib with RVR but BP stable - suspect slow GI bleed   Patient asymptomatic  On review of SNF paperwork-patient taking Eliquis 2.5 Mg BID and ASA 325mg q8h -hold both of these medications  S/P 3 unit PRBC since admission   Iron panel showed severe iron deficiency anemia  Protonix 40 Mg IV twice daily started-continue  Completed Venofer x 3 days, now on oral iron   Will have EGD/colonoscopy today

## 2024-05-26 NOTE — ANESTHESIA PREPROCEDURE EVALUATION
Procedure:  PRE-OP ONLY    Relevant Problems   CARDIO   (+) Atrial fibrillation with RVR (HCC)   (+) Hyperlipidemia   (+) Hypertension   (+) Hypertensive urgency   (+) Paroxysmal atrial fibrillation (HCC)      GI/HEPATIC   (+) GERD (gastroesophageal reflux disease)      HEMATOLOGY   (+) Anemia      MUSCULOSKELETAL   (+) Back pain   (+) Bilateral low back pain without sciatica   (+) Generalized osteoarthritis of multiple sites   (+) Lumbar spondylosis   (+) Primary osteoarthritis of both knees   (+) Primary osteoarthritis of left hip      NEURO/PSYCH   (+) Chronic pain syndrome             Anesthesia Plan  ASA Score- 3     Anesthesia Type- IV sedation with anesthesia with ASA Monitors.         Additional Monitors:     Airway Plan:            Plan Factors-    Chart reviewed. EKG reviewed.  Existing labs reviewed. Patient summary reviewed.    Patient is not a current smoker.              Induction- intravenous.    Postoperative Plan-         Informed Consent- Anesthetic plan and risks discussed with patient.  I personally reviewed this patient with the CRNA. Discussed and agreed on the Anesthesia Plan with the CRNA..

## 2024-05-26 NOTE — PROGRESS NOTES
"[facility]  Progress Note  Name: Amanda Gonzalez I  MRN: [id number]  Unit/Bed#: -01 I Date of Admission: 5/23/2024   Date of Service: 5/26/2024 I Hospital Day: 3    Assessment & Plan   * Anemia  Assessment & Plan  Recent Labs     05/24/24  1730 05/25/24  0502 05/26/24  0314   HGB 7.3* 6.8* 8.8*        Hgb 3.3 - presented in Afib with RVR but BP stable - suspect slow GI bleed   Patient asymptomatic  On review of SNF paperwork-patient taking Eliquis 2.5 Mg BID and ASA 325mg q8h -hold both of these medications  S/P 3 unit PRBC since admission   Iron panel showed severe iron deficiency anemia  Protonix 40 Mg IV twice daily started-continue  Completed Venofer x 3 days, now on oral iron   Will have EGD/colonoscopy today       Abnormal CT scan, gallbladder  Assessment & Plan  CT A/P: \"Distended gallbladder with gallstone and possible wall thickening.\"  Patient denies any abdominal pain, no RUQ tenderness on exam  No transaminitis , bilirubin or elevated alk phos  Hold on RUQ US at this time    Abnormal EKG  Assessment & Plan  EKG showing ST depressions inferior and anterolaterally, did improve slightly after rate control  Echo showed LVEF 55%, diastolic dysfunction 2, mild aortic regurgitation, moderate mitral regurgitation, moderate to severe tricuspid regurgitation, right ventricular systolic pressure 30, IVC normal.  No pericardial effusion.      Fall  Assessment & Plan  Reports a fall either 5 to 8 weeks ago with head strike from wheelchair, states that she was never evaluated since the fall  She denies any pain, but states that she has been primarily bedbound since fall  CT C/A/P without any evidence of trauma  Fall precautions  PT OT    Other problems related to medical facilities and other health care  Assessment & Plan  Pt expresses that she does not wish to return to [facility] and would like to live elsewhere after hospitalization   CM consulted     Atrial fibrillation " with RVR (HCC)  Assessment & Plan  History of A-fib -presented with a heart rate in the 140s  Reports that she has not been taking any of her medicine for the last few weeks  RC: Metoprolol tartate 12.5 Mg twice daily  AC: Eliquis 2.5 Mg twice daily  Hold home Eliquis in setting of severe anemia, might resume after egd/colonoscopy if not contraindicated   Intermittent high HR  Will adjust metoprolol after EGD if no active bleeding ( avoid too much metoprolol as the patient has compensatory mechanism for the GI bleeding)    Hypertension  Assessment & Plan  Home regimen: Metoprolol tartate 12.5mg BID and lisinopril 10 Mg daily  However patient noncompliant with medications  Currently on metoprolol only. Continue metoprolol and monitor  Blood pressure stable    Hyponatremia-resolved as of 5/25/2024  Assessment & Plan  Recent Labs     05/24/24  0500 05/25/24  0502 05/26/24  0314   SODIUM 134* 135 136         Sodium 132 on admission  Improved    Cystitis-resolved as of 5/25/2024  Assessment & Plan  UA with 10-20 WBCs and innumerable bacteria  Patient denies any symptoms-discontinue antibiotics patient is asymptomatic.              VTE Pharmacologic Prophylaxis: VTE Score: 3 Moderate Risk (Score 3-4) - Pharmacological DVT Prophylaxis Ordered: heparin.    Mobility:   Basic Mobility Inpatient Raw Score: 9  JH-HLM Goal: 3: Sit at edge of bed  JH-HLM Achieved: 2: Bed activities/Dependent transfer  JH-HLM Goal NOT achieved. Continue with multidisciplinary rounding and encourage appropriate mobility to improve upon JH-HLM goals.    Patient Centered Rounds: I performed bedside rounds with nursing staff today.   Discussions with Specialists or Other Care Team Provider: gi    Education and Discussions with Family / Patient:  will call daughter after egd/colonoscopy .     Total Time Spent on Date of Encounter in care of patient: 35 mins. This time was spent on one or more of the following: performing physical exam; counseling and  coordination of care; obtaining or reviewing history; documenting in the medical record; reviewing/ordering tests, medications or procedures; communicating with other healthcare professionals and discussing with patient's family/caregivers.    Current Length of Stay: 3 day(s)  Current Patient Status: Inpatient   Certification Statement: The patient will continue to require additional inpatient hospital stay due to gi bleed   Discharge Plan: Anticipate discharge in 24-48 hrs to rehab facility.    Code Status: Level 3 - DNAR and DNI    Subjective:     No complaints this am, sleeping comfortably.     Objective:     Vitals:   Temp (24hrs), Av.8 °F (36.6 °C), Min:97 °F (36.1 °C), Max:98.7 °F (37.1 °C)    Temp:  [97 °F (36.1 °C)-98.7 °F (37.1 °C)] 97.9 °F (36.6 °C)  HR:  [] 114  Resp:  [16-18] 16  BP: (109-127)/(62-84) 124/82  SpO2:  [93 %-100 %] 96 %  Body mass index is 19.81 kg/m².     Input and Output Summary (last 24 hours):     Intake/Output Summary (Last 24 hours) at 2024 1047  Last data filed at 2024 0601  Gross per 24 hour   Intake 3300 ml   Output --   Net 3300 ml       Physical Exam:   Physical Exam  Vitals and nursing note reviewed.   Constitutional:       General: She is not in acute distress.     Appearance: She is not diaphoretic.   HENT:      Head: Normocephalic.   Eyes:      General:         Right eye: No discharge.         Left eye: No discharge.   Cardiovascular:      Rate and Rhythm: Normal rate. Rhythm irregular.   Pulmonary:      Effort: Pulmonary effort is normal. No respiratory distress.      Breath sounds: Normal breath sounds. No wheezing, rhonchi or rales.   Abdominal:      General: There is no distension.      Palpations: Abdomen is soft.      Tenderness: There is no abdominal tenderness. There is no guarding or rebound.   Musculoskeletal:      Cervical back: Normal range of motion.      Right lower leg: No edema.      Left lower leg: No edema.   Skin:     General: Skin is  warm.      Coloration: Skin is pale.   Neurological:      Mental Status: She is alert and oriented to person, place, and time.   Psychiatric:         Mood and Affect: Mood normal.         Behavior: Behavior normal.          Additional Data:     Labs:  Results from last 7 days   Lab Units 05/26/24  0314 05/24/24  0026 05/23/24  1657   WBC Thousand/uL 5.67   < > 4.57   HEMOGLOBIN g/dL 8.8*   < > 3.3*   HEMATOCRIT % 29.3*   < > 12.7*   PLATELETS Thousands/uL 288   < > 398*   SEGS PCT %  --   --  66   LYMPHO PCT %  --   --  22   MONO PCT %  --   --  10   EOS PCT %  --   --  1    < > = values in this interval not displayed.     Results from last 7 days   Lab Units 05/26/24  0314 05/25/24  0502   SODIUM mmol/L 136 135   POTASSIUM mmol/L 3.9 3.7   CHLORIDE mmol/L 107 109*   CO2 mmol/L 23 19*   BUN mg/dL 13 20   CREATININE mg/dL 0.68 0.72   ANION GAP mmol/L 6 7   CALCIUM mg/dL 8.7 8.6   ALBUMIN g/dL  --  3.1*   TOTAL BILIRUBIN mg/dL  --  0.81   ALK PHOS U/L  --  36   ALT U/L  --  7   AST U/L  --  13   GLUCOSE RANDOM mg/dL 85 89     Results from last 7 days   Lab Units 05/23/24  1657   INR  1.36*             Results from last 7 days   Lab Units 05/23/24  1657   LACTIC ACID mmol/L 1.5   PROCALCITONIN ng/ml 0.12       Lines/Drains:  Invasive Devices       Peripheral Intravenous Line  Duration             Peripheral IV 05/25/24 Dorsal (posterior);Right Forearm <1 day                      Telemetry:  Telemetry Orders (From admission, onward)               24 Hour Telemetry Monitoring  Continuous x 24 Hours (Telem)        Question:  Reason for 24 Hour Telemetry  Answer:  Arrhythmias requiring acute medical intervention / PPM or ICD malfunction                     Telemetry Reviewed: Atrial fibrillation. HR averaging 100  Indication for Continued Telemetry Use: Arrthymias requiring medical therapy             Imaging: No pertinent imaging reviewed.    Recent Cultures (last 7 days):   Results from last 7 days   Lab Units  05/23/24  1905 05/23/24  1657   BLOOD CULTURE   --  No Growth at 48 hrs.  No Growth at 48 hrs.   URINE CULTURE  >100,000 cfu/ml Aerococcus species*  <10,000 cfu/ml  --        Last 24 Hours Medication List:   Current Facility-Administered Medications   Medication Dose Route Frequency Provider Last Rate    acetaminophen  650 mg Oral Q6H PRN Camille Montes MD      [START ON 5/27/2024] ferrous sulfate  325 mg Oral Daily With Breakfast Camille Montes MD      metoprolol  2.5 mg Intravenous Q6H PRN Camille Montes MD      metoprolol tartrate  12.5 mg Oral Q12H JITENDRA Montes MD      ondansetron  4 mg Intravenous Q6H PRN Camille Montes MD      pantoprazole  40 mg Intravenous Q12H JITENDRA Montes MD          Today, Patient Was Seen By: Camille Montes MD    **Please Note: This note may have been constructed using a voice recognition system.**

## 2024-05-26 NOTE — ASSESSMENT & PLAN NOTE
Pt expresses that she does not wish to return to [facility] and would like to live elsewhere after hospitalization   CM consulted

## 2024-05-26 NOTE — PLAN OF CARE
Problem: Potential for Falls  Goal: Patient will remain free of falls  Description: INTERVENTIONS:  - Educate patient/family on patient safety including physical limitations  - Instruct patient to call for assistance with activity   - Consult OT/PT to assist with strengthening/mobility   - Keep Call bell within reach  - Keep bed low and locked with side rails adjusted as appropriate  - Keep care items and personal belongings within reach  - Initiate and maintain comfort rounds  - Make Fall Risk Sign visible to staff  - Offer Toileting every 2 Hours, in advance of need  - Initiate/Maintain bed alarm  - Obtain necessary fall risk management equipment: non slip socks  - Apply yellow socks and bracelet for high fall risk patients  - Consider moving patient to room near nurses station  Outcome: Progressing     Problem: PAIN - ADULT  Goal: Verbalizes/displays adequate comfort level or baseline comfort level  Description: Interventions:  - Encourage patient to monitor pain and request assistance  - Assess pain using appropriate pain scale  - Administer analgesics based on type and severity of pain and evaluate response  - Implement non-pharmacological measures as appropriate and evaluate response  - Consider cultural and social influences on pain and pain management  - Notify physician/advanced practitioner if interventions unsuccessful or patient reports new pain  Outcome: Progressing     Problem: INFECTION - ADULT  Goal: Absence or prevention of progression during hospitalization  Description: INTERVENTIONS:  - Assess and monitor for signs and symptoms of infection  - Monitor lab/diagnostic results  - Monitor all insertion sites, i.e. indwelling lines, tubes, and drains  - Monitor endotracheal if appropriate and nasal secretions for changes in amount and color  - [place] appropriate cooling/warming therapies per order  - Administer medications as ordered  - Instruct and encourage patient and family to use good hand  hygiene technique  - Identify and instruct in appropriate isolation precautions for identified infection/condition  Outcome: Progressing  Goal: Absence of fever/infection during neutropenic period  Description: INTERVENTIONS:  - Monitor WBC    Outcome: Progressing     Problem: SAFETY ADULT  Goal: Patient will remain free of falls  Description: INTERVENTIONS:  - Educate patient/family on patient safety including physical limitations  - Instruct patient to call for assistance with activity   - Consult OT/PT to assist with strengthening/mobility   - Keep Call bell within reach  - Keep bed low and locked with side rails adjusted as appropriate  - Keep care items and personal belongings within reach  - Initiate and maintain comfort rounds  - Make Fall Risk Sign visible to staff  - Offer Toileting every 2 Hours, in advance of need  - Initiate/Maintain bed alarm  - Obtain necessary fall risk management equipment: non slip socks  - Apply yellow socks and bracelet for high fall risk patients  - Consider moving patient to room near nurses station  Outcome: Progressing  Goal: Maintain or return to baseline ADL function  Description: INTERVENTIONS:  -  Assess patient's ability to carry out ADLs; assess patient's baseline for ADL function and identify physical deficits which impact ability to perform ADLs (bathing, care of mouth/teeth, toileting, grooming, dressing, etc.)  - Assess/evaluate cause of self-care deficits   - Assess range of motion  - Assess patient's mobility; develop plan if impaired  - Assess patient's need for assistive devices and provide as appropriate  - Encourage maximum independence but intervene and supervise when necessary  - Involve family in performance of ADLs  - Assess for home care needs following discharge   - Consider OT consult to assist with ADL evaluation and planning for discharge  - Provide patient education as appropriate  Outcome: Progressing  Goal: Maintains/Returns to pre admission  functional level  Description: INTERVENTIONS:  - Perform AM-PAC 6 Click Basic Mobility/ Daily Activity assessment daily.  - Set and communicate daily mobility goal to care team and patient/family/caregiver.   - Collaborate with rehabilitation services on mobility goals if consulted  - Perform Range of Motion 5 times a day.  - Reposition patient every 2 hours.  - Dangle patient 3 times a day  - Stand patient 3 times a day  - Ambulate patient 3 times a day  - Out of bed to chair 3 times a day   - Out of bed for meals 3 times a day  - Out of bed for toileting  - Record patient progress and toleration of activity level   Outcome: Progressing     Problem: DISCHARGE PLANNING  Goal: Discharge to home or other facility with appropriate resources  Description: INTERVENTIONS:  - Identify barriers to discharge w/patient and caregiver  - Arrange for needed discharge resources and transportation as appropriate  - Identify discharge learning needs (meds, wound care, etc.)  - Arrange for interpretive services to assist at discharge as needed  - Refer to Case Management Department for coordinating discharge planning if the patient needs post-hospital services based on physician/advanced practitioner order or complex needs related to functional status, cognitive ability, or social support system  Outcome: Progressing     Problem: Knowledge Deficit  Goal: Patient/family/caregiver demonstrates understanding of disease process, treatment plan, medications, and discharge instructions  Description: Complete learning assessment and assess knowledge base.  Interventions:  - Provide teaching at level of understanding  - Provide teaching via preferred learning methods  Outcome: Progressing     Problem: CARDIOVASCULAR - ADULT  Goal: Maintains optimal cardiac output and hemodynamic stability  Description: INTERVENTIONS:  - Monitor I/O, vital signs and rhythm  - Monitor for S/S and trends of decreased cardiac output  - Administer and titrate  ordered vasoactive medications to optimize hemodynamic stability  - Assess quality of pulses, skin color and temperature  - Assess for signs of decreased coronary artery perfusion  - Instruct patient to report change in severity of symptoms  Outcome: Progressing  Goal: Absence of cardiac dysrhythmias or at baseline rhythm  Description: INTERVENTIONS:  - Continuous cardiac monitoring, vital signs, obtain 12 lead EKG if ordered  - Administer antiarrhythmic and heart rate control medications as ordered  - Monitor electrolytes and administer replacement therapy as ordered  Outcome: Progressing     Problem: RESPIRATORY - ADULT  Goal: Achieves optimal ventilation and oxygenation  Description: INTERVENTIONS:  - Assess for changes in respiratory status  - Assess for changes in mentation and behavior  - Position to facilitate oxygenation and minimize respiratory effort  - Oxygen administered by appropriate delivery if ordered  - Initiate smoking cessation education as indicated  - Encourage broncho-pulmonary hygiene including cough, deep breathe, Incentive Spirometry  - Assess the need for suctioning and aspirate as needed  - Assess and instruct to report SOB or any respiratory difficulty  - Respiratory Therapy support as indicated  Outcome: Progressing     Problem: SKIN/TISSUE INTEGRITY - ADULT  Goal: Skin Integrity remains intact(Skin Breakdown Prevention)  Description: Assess:  -Perform Sebastien assessment every   -Clean and moisturize skin every   -Inspect skin when repositioning, toileting, and assisting with ADLS  -Assess under medical devices such as  every   -Assess extremities for adequate circulation and sensation     Bed Management:  -Have minimal linens on bed & keep smooth, unwrinkled  -Change linens as needed when moist or perspiring  -Avoid sitting or lying in one position for more than  hours while in bed  -Keep HOB at degrees     Toileting:  -Offer bedside commode  -Assess for incontinence every   -Use  incontinent care products after each incontinent episode such as     Activity:  -Mobilize patient  times a day  -Encourage activity and walks on unit  -Encourage or provide ROM exercises   -Turn and reposition patient every  Hours  -Use appropriate equipment to lift or move patient in bed  -Instruct/ Assist with weight shifting every  when out of bed in chair  -Consider limitation of chair time  hour intervals    Skin Care:  -Avoid use of baby powder, tape, friction and shearing, hot water or constrictive clothing  -Relieve pressure over bony prominences using   -Do not massage red bony areas    Next Steps:  -Teach patient strategies to minimize risks such as    -Consider consults to  interdisciplinary teams such as   Outcome: Progressing  Goal: Incision(s), wounds(s) or drain site(s) healing without S/S of infection  Description: INTERVENTIONS  - Assess and document dressing, incision, wound bed, drain sites and surrounding tissue  - Provide patient and family education  - Perform skin care/dressing changes every   Outcome: Progressing  Goal: Pressure injury heals and does not worsen  Description: Interventions:  - Implement low air loss mattress or specialty surface (Criteria met)  - Apply silicone foam dressing  - Instruct/assist with weight shifting every  minutes when in chair   - Limit chair time to  hour intervals  - Use special pressure reducing interventions such as  when in chair   - Apply fecal or urinary incontinence containment device   - Perform passive or active ROM every   - Turn and reposition patient & offload bony prominences every  hours   - Utilize friction reducing device or surface for transfers   - Consider consults to  interdisciplinary teams such as   - Use incontinent care products after each incontinent episode such as   - Consider nutrition services referral as needed  Outcome: Progressing     Problem: HEMATOLOGIC - ADULT  Goal: Maintains hematologic stability  Description:  INTERVENTIONS  - Assess for signs and symptoms of bleeding or hemorrhage  - Monitor labs  - Administer supportive blood products/factors as ordered and appropriate  Outcome: Progressing     Problem: MUSCULOSKELETAL - ADULT  Goal: Maintain or return mobility to safest level of function  Description: INTERVENTIONS:  - Assess patient's ability to carry out ADLs; assess patient's baseline for ADL function and identify physical deficits which impact ability to perform ADLs (bathing, care of mouth/teeth, toileting, grooming, dressing, etc.)  - Assess/evaluate cause of self-care deficits   - Assess range of motion  - Assess patient's mobility  - Assess patient's need for assistive devices and provide as appropriate  - Encourage maximum independence but intervene and supervise when necessary  - Involve family in performance of ADLs  - Assess for home care needs following discharge   - Consider OT consult to assist with ADL evaluation and planning for discharge  - Provide patient education as appropriate  Outcome: Progressing  Goal: Maintain proper alignment of affected body part  Description: INTERVENTIONS:  - Support, maintain and protect limb and body alignment  - Provide patient/ family with appropriate education  Outcome: Progressing     Problem: Prexisting or High Potential for Compromised Skin Integrity  Goal: Skin integrity is maintained or improved  Description: INTERVENTIONS:  - Identify patients at risk for skin breakdown  - Assess and monitor skin integrity  - Assess and monitor nutrition and hydration status  - Monitor labs   - Assess for incontinence   - Turn and reposition patient  - Assist with mobility/ambulation  - Relieve pressure over bony prominences  - Avoid friction and shearing  - Provide appropriate hygiene as needed including keeping skin clean and dry  - Evaluate need for skin moisturizer/barrier cream  - Collaborate with interdisciplinary team   - Patient/family teaching  - Consider wound care  consult   Outcome: Progressing     Problem: Nutrition/Hydration-ADULT  Goal: Nutrient/Hydration intake appropriate for improving, restoring or maintaining nutritional needs  Description: Monitor and assess patient's nutrition/hydration status for malnutrition. Collaborate with interdisciplinary team and initiate plan and interventions as ordered.  Monitor patient's weight and dietary intake as ordered or per policy. Utilize nutrition screening tool and intervene as necessary. Determine patient's food preferences and provide high-protein, high-caloric foods as appropriate.     INTERVENTIONS:  - Monitor oral intake, urinary output, labs, and treatment plans  - Assess nutrition and hydration status and recommend course of action  - Evaluate amount of meals eaten  - Assist patient with eating if necessary   - Allow adequate time for meals  - Recommend/ encourage appropriate diets, oral nutritional supplements, and vitamin/mineral supplements  - Order, calculate, and assess calorie counts as needed  - Recommend, monitor, and adjust tube feedings and TPN/PPN based on assessed needs  - Assess need for intravenous fluids  - Provide specific nutrition/hydration education as appropriate  - Include patient/family/caregiver in decisions related to nutrition  Outcome: Progressing

## 2024-05-26 NOTE — OP NOTE
EGD IMPRESSION:  The duodenum appeared normal.  Mild edematous, erythematous mucosa, consistent with gastritis in the body of the stomach; performed cold forceps biopsy  The esophagus appeared normal.      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    Will resume diet and follow hemoglobin  If hemoglobin remains stable okay to resume Eliquis tomorrow    Continue IV iron    Plan outpatient capsule to assess for further small bowel AVMs

## 2024-05-26 NOTE — PLAN OF CARE
Problem: Potential for Falls  Goal: Patient will remain free of falls  Description: INTERVENTIONS:  - Educate patient/family on patient safety including physical limitations  - Instruct patient to call for assistance with activity   - Consult OT/PT to assist with strengthening/mobility   - Keep Call bell within reach  - Keep bed low and locked with side rails adjusted as appropriate  - Keep care items and personal belongings within reach  - Initiate and maintain comfort rounds  - Make Fall Risk Sign visible to staff  - Offer Toileting every X Hours, in advance of need  - Initiate/Maintain Xalarm  - Obtain necessary fall risk management equipment: X  - Apply yellow socks and bracelet for high fall risk patients  - Consider moving patient to room near nurses station  Outcome: Progressing     Problem: PAIN - ADULT  Goal: Verbalizes/displays adequate comfort level or baseline comfort level  Description: Interventions:  - Encourage patient to monitor pain and request assistance  - Assess pain using appropriate pain scale  - Administer analgesics based on type and severity of pain and evaluate response  - Implement non-pharmacological measures as appropriate and evaluate response  - Consider cultural and social influences on pain and pain management  - Notify physician/advanced practitioner if interventions unsuccessful or patient reports new pain  Outcome: Progressing     Problem: INFECTION - ADULT  Goal: Absence or prevention of progression during hospitalization  Description: INTERVENTIONS:  - Assess and monitor for signs and symptoms of infection  - Monitor lab/diagnostic results  - Monitor all insertion sites, i.e. indwelling lines, tubes, and drains  - Monitor endotracheal if appropriate and nasal secretions for changes in amount and color  - Cora appropriate cooling/warming therapies per order  - Administer medications as ordered  - Instruct and encourage patient and family to use good hand hygiene  technique  - Identify and instruct in appropriate isolation precautions for identified infection/condition  Outcome: Progressing  Goal: Absence of fever/infection during neutropenic period  Description: INTERVENTIONS:  - Monitor WBC    Outcome: Progressing     Problem: SAFETY ADULT  Goal: Patient will remain free of falls  Description: INTERVENTIONS:  - Educate patient/family on patient safety including physical limitations  - Instruct patient to call for assistance with activity   - Consult OT/PT to assist with strengthening/mobility   - Keep Call bell within reach  - Keep bed low and locked with side rails adjusted as appropriate  - Keep care items and personal belongings within reach  - Initiate and maintain comfort rounds  - Make Fall Risk Sign visible to staff  - Offer Toileting every X Hours, in advance of need  - Initiate/Maintain Xalarm  - Obtain necessary fall risk management equipment: X  - Apply yellow socks and bracelet for high fall risk patients  - Consider moving patient to room near nurses station  Outcome: Progressing  Goal: Maintain or return to baseline ADL function  Description: INTERVENTIONS:  -  Assess patient's ability to carry out ADLs; assess patient's baseline for ADL function and identify physical deficits which impact ability to perform ADLs (bathing, care of mouth/teeth, toileting, grooming, dressing, etc.)  - Assess/evaluate cause of self-care deficits   - Assess range of motion  - Assess patient's mobility; develop plan if impaired  - Assess patient's need for assistive devices and provide as appropriate  - Encourage maximum independence but intervene and supervise when necessary  - Involve family in performance of ADLs  - Assess for home care needs following discharge   - Consider OT consult to assist with ADL evaluation and planning for discharge  - Provide patient education as appropriate  Outcome: Progressing  Goal: Maintains/Returns to pre admission functional level  Description:  INTERVENTIONS:  - Perform AM-PAC 6 Click Basic Mobility/ Daily Activity assessment daily.  - Set and communicate daily mobility goal to care team and patient/family/caregiver.   - Collaborate with rehabilitation services on mobility goals if consulted  - Perform Range of Motion X times a day.  - Reposition patient every X hours.  - Dangle patient X times a day  - Stand patient X times a day  - Ambulate patient X times a day  - Out of bed to chair X times a day   - Out of bed for meals X times a day  - Out of bed for toileting  - Record patient progress and toleration of activity level   Outcome: Progressing     Problem: DISCHARGE PLANNING  Goal: Discharge to home or other facility with appropriate resources  Description: INTERVENTIONS:  - Identify barriers to discharge w/patient and caregiver  - Arrange for needed discharge resources and transportation as appropriate  - Identify discharge learning needs (meds, wound care, etc.)  - Arrange for interpretive services to assist at discharge as needed  - Refer to Case Management Department for coordinating discharge planning if the patient needs post-hospital services based on physician/advanced practitioner order or complex needs related to functional status, cognitive ability, or social support system  Outcome: Progressing     Problem: Knowledge Deficit  Goal: Patient/family/caregiver demonstrates understanding of disease process, treatment plan, medications, and discharge instructions  Description: Complete learning assessment and assess knowledge base.  Interventions:  - Provide teaching at level of understanding  - Provide teaching via preferred learning methods  Outcome: Progressing     Problem: CARDIOVASCULAR - ADULT  Goal: Maintains optimal cardiac output and hemodynamic stability  Description: INTERVENTIONS:  - Monitor I/O, vital signs and rhythm  - Monitor for S/S and trends of decreased cardiac output  - Administer and titrate ordered vasoactive medications to  optimize hemodynamic stability  - Assess quality of pulses, skin color and temperature  - Assess for signs of decreased coronary artery perfusion  - Instruct patient to report change in severity of symptoms  Outcome: Progressing  Goal: Absence of cardiac dysrhythmias or at baseline rhythm  Description: INTERVENTIONS:  - Continuous cardiac monitoring, vital signs, obtain 12 lead EKG if ordered  - Administer antiarrhythmic and heart rate control medications as ordered  - Monitor electrolytes and administer replacement therapy as ordered  Outcome: Progressing     Problem: RESPIRATORY - ADULT  Goal: Achieves optimal ventilation and oxygenation  Description: INTERVENTIONS:  - Assess for changes in respiratory status  - Assess for changes in mentation and behavior  - Position to facilitate oxygenation and minimize respiratory effort  - Oxygen administered by appropriate delivery if ordered  - Initiate smoking cessation education as indicated  - Encourage broncho-pulmonary hygiene including cough, deep breathe, Incentive Spirometry  - Assess the need for suctioning and aspirate as needed  - Assess and instruct to report SOB or any respiratory difficulty  - Respiratory Therapy support as indicated  Outcome: Progressing     Problem: SKIN/TISSUE INTEGRITY - ADULT  Goal: Skin Integrity remains intact(Skin Breakdown Prevention)  Description: Assess:  -Perform Sebastien assessment every X  -Clean and moisturize skin every X  -Inspect skin when repositioning, toileting, and assisting with ADLS  -Assess under medical devices such as X every X  -Assess extremities for adequate circulation and sensation     Bed Management:  -Have minimal linens on bed & keep smooth, unwrinkled  -Change linens as needed when moist or perspiring  -Avoid sitting or lying in one position for more than X hours while in bed  -Keep HOB at Xdegrees     Toileting:  -Offer bedside commode  -Assess for incontinence every X  -Use incontinent care products after  each incontinent episode such as X    Activity:  -Mobilize patient X times a day  -Encourage activity and walks on unit  -Encourage or provide ROM exercises   -Turn and reposition patient every X Hours  -Use appropriate equipment to lift or move patient in bed  -Instruct/ Assist with weight shifting every X when out of bed in chair  -Consider limitation of chair time X hour intervals    Skin Care:  -Avoid use of baby powder, tape, friction and shearing, hot water or constrictive clothing  -Relieve pressure over bony prominences using XX  -Do not massage red bony areas    Next Steps:  -Teach patient strategies to minimize risks such as X   -Consider consults to  interdisciplinary teams such as X  Outcome: Progressing  Goal: Incision(s), wounds(s) or drain site(s) healing without S/S of infection  Description: INTERVENTIONS  - Assess and document dressing, incision, wound bed, drain sites and surrounding tissue  - Provide patient and family education  - Perform skin care/dressing changes every X  Outcome: Progressing  Goal: Pressure injury heals and does not worsen  Description: Interventions:  - Implement low air loss mattress or specialty surface (Criteria met)  - Apply silicone foam dressing  - Instruct/assist with weight shifting every X minutes when in chair   - Limit chair time to X hour intervals  - Use special pressure reducing interventions such as X when in chair   - Apply fecal or urinary incontinence containment device   - Perform passive or active ROM every X  - Turn and reposition patient & offload bony prominences every X hours   - Utilize friction reducing device or surface for transfers   - Consider consults to  interdisciplinary teams such as X  - Use incontinent care products after each incontinent episode such as X  - Consider nutrition services referral as needed  Outcome: Progressing     Problem: HEMATOLOGIC - ADULT  Goal: Maintains hematologic stability  Description: INTERVENTIONS  - Assess for  signs and symptoms of bleeding or hemorrhage  - Monitor labs  - Administer supportive blood products/factors as ordered and appropriate  Outcome: Progressing     Problem: MUSCULOSKELETAL - ADULT  Goal: Maintain or return mobility to safest level of function  Description: INTERVENTIONS:  - Assess patient's ability to carry out ADLs; assess patient's baseline for ADL function and identify physical deficits which impact ability to perform ADLs (bathing, care of mouth/teeth, toileting, grooming, dressing, etc.)  - Assess/evaluate cause of self-care deficits   - Assess range of motion  - Assess patient's mobility  - Assess patient's need for assistive devices and provide as appropriate  - Encourage maximum independence but intervene and supervise when necessary  - Involve family in performance of ADLs  - Assess for home care needs following discharge   - Consider OT consult to assist with ADL evaluation and planning for discharge  - Provide patient education as appropriate  Outcome: Progressing  Goal: Maintain proper alignment of affected body part  Description: INTERVENTIONS:  - Support, maintain and protect limb and body alignment  - Provide patient/ family with appropriate education  Outcome: Progressing     Problem: Prexisting or High Potential for Compromised Skin Integrity  Goal: Skin integrity is maintained or improved  Description: INTERVENTIONS:  - Identify patients at risk for skin breakdown  - Assess and monitor skin integrity  - Assess and monitor nutrition and hydration status  - Monitor labs   - Assess for incontinence   - Turn and reposition patient  - Assist with mobility/ambulation  - Relieve pressure over bony prominences  - Avoid friction and shearing  - Provide appropriate hygiene as needed including keeping skin clean and dry  - Evaluate need for skin moisturizer/barrier cream  - Collaborate with interdisciplinary team   - Patient/family teaching  - Consider wound care consult   Outcome:  Progressing     Problem: Nutrition/Hydration-ADULT  Goal: Nutrient/Hydration intake appropriate for improving, restoring or maintaining nutritional needs  Description: Monitor and assess patient's nutrition/hydration status for malnutrition. Collaborate with interdisciplinary team and initiate plan and interventions as ordered.  Monitor patient's weight and dietary intake as ordered or per policy. Utilize nutrition screening tool and intervene as necessary. Determine patient's food preferences and provide high-protein, high-caloric foods as appropriate.     INTERVENTIONS:  - Monitor oral intake, urinary output, labs, and treatment plans  - Assess nutrition and hydration status and recommend course of action  - Evaluate amount of meals eaten  - Assist patient with eating if necessary   - Allow adequate time for meals  - Recommend/ encourage appropriate diets, oral nutritional supplements, and vitamin/mineral supplements  - Order, calculate, and assess calorie counts as needed  - Recommend, monitor, and adjust tube feedings and TPN/PPN based on assessed needs  - Assess need for intravenous fluids  - Provide specific nutrition/hydration education as appropriate  - Include patient/family/caregiver in decisions related to nutrition  Outcome: Progressing

## 2024-05-26 NOTE — ASSESSMENT & PLAN NOTE
History of A-fib -presented with a heart rate in the 140s  Reports that she has not been taking any of her medicine for the last few weeks  RC: Metoprolol tartate 12.5 Mg twice daily  AC: Eliquis 2.5 Mg twice daily  Hold home Eliquis in setting of severe anemia, might resume after egd/colonoscopy if not contraindicated   Intermittent high HR  Will adjust metoprolol after EGD if no active bleeding ( avoid too much metoprolol as the patient has compensatory mechanism for the GI bleeding)

## 2024-05-27 LAB
ANION GAP SERPL CALCULATED.3IONS-SCNC: 9 MMOL/L (ref 4–13)
BUN SERPL-MCNC: 11 MG/DL (ref 5–25)
CALCIUM SERPL-MCNC: 8.3 MG/DL (ref 8.4–10.2)
CHLORIDE SERPL-SCNC: 107 MMOL/L (ref 96–108)
CO2 SERPL-SCNC: 20 MMOL/L (ref 21–32)
CREAT SERPL-MCNC: 0.61 MG/DL (ref 0.6–1.3)
ERYTHROCYTE [DISTWIDTH] IN BLOOD BY AUTOMATED COUNT: 23.2 % (ref 11.6–15.1)
GFR SERPL CREATININE-BSD FRML MDRD: 81 ML/MIN/1.73SQ M
GLUCOSE SERPL-MCNC: 105 MG/DL (ref 65–140)
HCT VFR BLD AUTO: 27.4 % (ref 34.8–46.1)
HCT VFR BLD AUTO: 31.3 % (ref 34.8–46.1)
HGB BLD-MCNC: 7.7 G/DL (ref 11.5–15.4)
HGB BLD-MCNC: 8.6 G/DL (ref 11.5–15.4)
MAGNESIUM SERPL-MCNC: 1.9 MG/DL (ref 1.9–2.7)
MCH RBC QN AUTO: 22.7 PG (ref 26.8–34.3)
MCHC RBC AUTO-ENTMCNC: 28.1 G/DL (ref 31.4–37.4)
MCV RBC AUTO: 81 FL (ref 82–98)
PLATELET # BLD AUTO: 238 THOUSANDS/UL (ref 149–390)
PMV BLD AUTO: 9.7 FL (ref 8.9–12.7)
POTASSIUM SERPL-SCNC: 3.2 MMOL/L (ref 3.5–5.3)
RBC # BLD AUTO: 3.39 MILLION/UL (ref 3.81–5.12)
SODIUM SERPL-SCNC: 136 MMOL/L (ref 135–147)
WBC # BLD AUTO: 3.93 THOUSAND/UL (ref 4.31–10.16)

## 2024-05-27 PROCEDURE — 83735 ASSAY OF MAGNESIUM: CPT | Performed by: PHYSICIAN ASSISTANT

## 2024-05-27 PROCEDURE — 85014 HEMATOCRIT: CPT | Performed by: PHYSICIAN ASSISTANT

## 2024-05-27 PROCEDURE — 99232 SBSQ HOSP IP/OBS MODERATE 35: CPT | Performed by: INTERNAL MEDICINE

## 2024-05-27 PROCEDURE — 80048 BASIC METABOLIC PNL TOTAL CA: CPT | Performed by: INTERNAL MEDICINE

## 2024-05-27 PROCEDURE — 85027 COMPLETE CBC AUTOMATED: CPT | Performed by: INTERNAL MEDICINE

## 2024-05-27 PROCEDURE — 85018 HEMOGLOBIN: CPT | Performed by: PHYSICIAN ASSISTANT

## 2024-05-27 PROCEDURE — 99232 SBSQ HOSP IP/OBS MODERATE 35: CPT | Performed by: PHYSICIAN ASSISTANT

## 2024-05-27 RX ORDER — POTASSIUM CHLORIDE 20 MEQ/1
40 TABLET, EXTENDED RELEASE ORAL ONCE
Status: COMPLETED | OUTPATIENT
Start: 2024-05-27 | End: 2024-05-27

## 2024-05-27 RX ORDER — POTASSIUM CHLORIDE 20 MEQ/1
20 TABLET, EXTENDED RELEASE ORAL ONCE
Status: COMPLETED | OUTPATIENT
Start: 2024-05-27 | End: 2024-05-27

## 2024-05-27 RX ORDER — POTASSIUM CHLORIDE 14.9 MG/ML
20 INJECTION INTRAVENOUS ONCE
Status: DISCONTINUED | OUTPATIENT
Start: 2024-05-27 | End: 2024-05-27

## 2024-05-27 RX ADMIN — HEPARIN SODIUM 5000 UNITS: 5000 INJECTION INTRAVENOUS; SUBCUTANEOUS at 05:19

## 2024-05-27 RX ADMIN — FERROUS SULFATE TAB 325 MG (65 MG ELEMENTAL FE) 325 MG: 325 (65 FE) TAB at 08:34

## 2024-05-27 RX ADMIN — Medication 12.5 MG: at 20:27

## 2024-05-27 RX ADMIN — POTASSIUM CHLORIDE 20 MEQ: 1500 TABLET, EXTENDED RELEASE ORAL at 10:14

## 2024-05-27 RX ADMIN — ACETAMINOPHEN 650 MG: 325 TABLET, FILM COATED ORAL at 05:19

## 2024-05-27 RX ADMIN — APIXABAN 2.5 MG: 2.5 TABLET, FILM COATED ORAL at 17:41

## 2024-05-27 RX ADMIN — PANTOPRAZOLE SODIUM 40 MG: 40 TABLET, DELAYED RELEASE ORAL at 05:19

## 2024-05-27 RX ADMIN — POTASSIUM CHLORIDE 40 MEQ: 1500 TABLET, EXTENDED RELEASE ORAL at 08:34

## 2024-05-27 RX ADMIN — POTASSIUM CHLORIDE 20 MEQ: 14.9 INJECTION, SOLUTION INTRAVENOUS at 08:57

## 2024-05-27 RX ADMIN — Medication 12.5 MG: at 08:34

## 2024-05-27 RX ADMIN — HEPARIN SODIUM 5000 UNITS: 5000 INJECTION INTRAVENOUS; SUBCUTANEOUS at 14:56

## 2024-05-27 RX ADMIN — ACETAMINOPHEN 650 MG: 325 TABLET, FILM COATED ORAL at 15:01

## 2024-05-27 NOTE — PLAN OF CARE
Problem: Potential for Falls  Goal: Patient will remain free of falls  Description: INTERVENTIONS:  - Educate patient/family on patient safety including physical limitations  - Instruct patient to call for assistance with activity   - Consult OT/PT to assist with strengthening/mobility   - Keep Call bell within reach  - Keep bed low and locked with side rails adjusted as appropriate  - Keep care items and personal belongings within reach  - Initiate and maintain comfort rounds  - Make Fall Risk Sign visible to staff  - Offer Toileting every 2 Hours, in advance of need  - Initiate/Maintain bed/chair alarm  - Obtain necessary fall risk management equipment:   - Apply yellow socks and bracelet for high fall risk patients  - Consider moving patient to room near nurses station  Outcome: Progressing     Problem: PAIN - ADULT  Goal: Verbalizes/displays adequate comfort level or baseline comfort level  Description: Interventions:  - Encourage patient to monitor pain and request assistance  - Assess pain using appropriate pain scale  - Administer analgesics based on type and severity of pain and evaluate response  - Implement non-pharmacological measures as appropriate and evaluate response  - Consider cultural and social influences on pain and pain management  - Notify physician/advanced practitioner if interventions unsuccessful or patient reports new pain  Outcome: Progressing     Problem: INFECTION - ADULT  Goal: Absence or prevention of progression during hospitalization  Description: INTERVENTIONS:  - Assess and monitor for signs and symptoms of infection  - Monitor lab/diagnostic results  - Monitor all insertion sites, i.e. indwelling lines, tubes, and drains  - Monitor endotracheal if appropriate and nasal secretions for changes in amount and color  - [place] appropriate cooling/warming therapies per order  - Administer medications as ordered  - Instruct and encourage patient and family to use good hand hygiene  technique  - Identify and instruct in appropriate isolation precautions for identified infection/condition  Outcome: Progressing  Goal: Absence of fever/infection during neutropenic period  Description: INTERVENTIONS:  - Monitor WBC    Outcome: Progressing     Problem: SAFETY ADULT  Goal: Patient will remain free of falls  Description: INTERVENTIONS:  - Educate patient/family on patient safety including physical limitations  - Instruct patient to call for assistance with activity   - Consult OT/PT to assist with strengthening/mobility   - Keep Call bell within reach  - Keep bed low and locked with side rails adjusted as appropriate  - Keep care items and personal belongings within reach  - Initiate and maintain comfort rounds  - Make Fall Risk Sign visible to staff  - Offer Toileting every 2 Hours, in advance of need  - Initiate/Maintain bed/chair alarm  - Obtain necessary fall risk management equipment:   - Apply yellow socks and bracelet for high fall risk patients  - Consider moving patient to room near nurses station  Outcome: Progressing  Goal: Maintain or return to baseline ADL function  Description: INTERVENTIONS:  - Educate patient/family on patient safety including physical limitations  - Instruct patient to call for assistance with activity   - Consult OT/PT to assist with strengthening/mobility   - Keep Call bell within reach  - Keep bed low and locked with side rails adjusted as appropriate  - Keep care items and personal belongings within reach  - Initiate and maintain comfort rounds  - Make Fall Risk Sign visible to staff  - Offer Toileting every 2 Hours, in advance of need  - Initiate/Maintain bed/chair alarm  - Obtain necessary fall risk management equipment:   - Apply yellow socks and bracelet for high fall risk patients  - Consider moving patient to room near nurses station  Outcome: Progressing  Goal: Maintains/Returns to pre admission functional level  Description: INTERVENTIONS:  - Perform  AM-PAC 6 Click Basic Mobility/ Daily Activity assessment daily.  - Set and communicate daily mobility goal to care team and patient/family/caregiver.   - Collaborate with rehabilitation services on mobility goals if consulted  - Perform Range of Motion 3 times a day.  - Reposition patient every 3 hours.  - Dangle patient 3 times a day  - Stand patient 3 times a day  - Ambulate patient 3 times a day  - Out of bed to chair 3 times a day   - Out of bed for meals 3 times a day  - Out of bed for toileting  - Record patient progress and toleration of activity level   Outcome: Progressing     Problem: DISCHARGE PLANNING  Goal: Discharge to home or other facility with appropriate resources  Description: INTERVENTIONS:  - Identify barriers to discharge w/patient and caregiver  - Arrange for needed discharge resources and transportation as appropriate  - Identify discharge learning needs (meds, wound care, etc.)  - Arrange for interpretive services to assist at discharge as needed  - Refer to Case Management Department for coordinating discharge planning if the patient needs post-hospital services based on physician/advanced practitioner order or complex needs related to functional status, cognitive ability, or social support system  Outcome: Progressing     Problem: Knowledge Deficit  Goal: Patient/family/caregiver demonstrates understanding of disease process, treatment plan, medications, and discharge instructions  Description: Complete learning assessment and assess knowledge base.  Interventions:  - Provide teaching at level of understanding  - Provide teaching via preferred learning methods  Outcome: Progressing     Problem: CARDIOVASCULAR - ADULT  Goal: Maintains optimal cardiac output and hemodynamic stability  Description: INTERVENTIONS:  - Monitor I/O, vital signs and rhythm  - Monitor for S/S and trends of decreased cardiac output  - Administer and titrate ordered vasoactive medications to optimize hemodynamic  stability  - Assess quality of pulses, skin color and temperature  - Assess for signs of decreased coronary artery perfusion  - Instruct patient to report change in severity of symptoms  Outcome: Progressing  Goal: Absence of cardiac dysrhythmias or at baseline rhythm  Description: INTERVENTIONS:  - Continuous cardiac monitoring, vital signs, obtain 12 lead EKG if ordered  - Administer antiarrhythmic and heart rate control medications as ordered  - Monitor electrolytes and administer replacement therapy as ordered  Outcome: Progressing     Problem: RESPIRATORY - ADULT  Goal: Achieves optimal ventilation and oxygenation  Description: INTERVENTIONS:  - Assess for changes in respiratory status  - Assess for changes in mentation and behavior  - Position to facilitate oxygenation and minimize respiratory effort  - Oxygen administered by appropriate delivery if ordered  - Initiate smoking cessation education as indicated  - Encourage broncho-pulmonary hygiene including cough, deep breathe, Incentive Spirometry  - Assess the need for suctioning and aspirate as needed  - Assess and instruct to report SOB or any respiratory difficulty  - Respiratory Therapy support as indicated  Outcome: Progressing     Problem: SKIN/TISSUE INTEGRITY - ADULT  Goal: Skin Integrity remains intact(Skin Breakdown Prevention)  Description: Assess:  -Perform Sebastien assessment every shift and PRN  -Clean and moisturize skin every shift and PRN  -Inspect skin when repositioning, toileting, and assisting with ADLS  -Assess under medical devices   -Assess extremities for adequate circulation and sensation     Bed Management:  -Have minimal linens on bed & keep smooth, unwrinkled  -Change linens as needed when moist or perspiring  -Avoid sitting or lying in one position for more than 2 hours while in bed  -Keep HOB at 30 degrees     Toileting:  -Offer bedside commode  -Assess for incontinence every shift and PRN  -Use incontinent care products after  each incontinent episode     Activity:  -Mobilize patient 3 times a day  -Encourage activity and walks on unit  -Encourage or provide ROM exercises   -Turn and reposition patient every 2 Hours  -Use appropriate equipment to lift or move patient in bed  -Instruct/ Assist with weight shifting every 2 hours when out of bed in chair  -Consider limitation of chair time 2 hour intervals    Skin Care:  -Avoid use of baby powder, tape, friction and shearing, hot water or constrictive clothing  -Relieve pressure over bony prominences using waffle cushion, pillows  -Do not massage red bony areas    Next Steps:  -Teach patient strategies to minimize risks    -Consider consults to  interdisciplinary teams   Outcome: Progressing  Goal: Incision(s), wounds(s) or drain site(s) healing without S/S of infection  Description: INTERVENTIONS  - Assess and document dressing, incision, wound bed, drain sites and surrounding tissue  - Provide patient and family education  - Perform skin care/dressing changes every shift and PRN  Outcome: Progressing  Goal: Pressure injury heals and does not worsen  Description: Interventions:  - Implement low air loss mattress or specialty surface (Criteria met)  - Apply silicone foam dressing  - Instruct/assist with weight shifting every 120 minutes when in chair   - Limit chair time to 2 hour intervals  - Use special pressure reducing interventions such as waffle cushion when in chair   - Apply fecal or urinary incontinence containment device   - Perform passive or active ROM every shift and PRN  - Turn and reposition patient & offload bony prominences every 2 hours   - Utilize friction reducing device or surface for transfers   - Consider consults to  interdisciplinary teams   - Use incontinent care products after each incontinent episode  - Consider nutrition services referral as needed  Outcome: Progressing     Problem: HEMATOLOGIC - ADULT  Goal: Maintains hematologic stability  Description:  INTERVENTIONS  - Assess for signs and symptoms of bleeding or hemorrhage  - Monitor labs  - Administer supportive blood products/factors as ordered and appropriate  Outcome: Progressing     Problem: MUSCULOSKELETAL - ADULT  Goal: Maintain or return mobility to safest level of function  Description: INTERVENTIONS:  - Assess patient's ability to carry out ADLs; assess patient's baseline for ADL function and identify physical deficits which impact ability to perform ADLs (bathing, care of mouth/teeth, toileting, grooming, dressing, etc.)  - Assess/evaluate cause of self-care deficits   - Assess range of motion  - Assess patient's mobility  - Assess patient's need for assistive devices and provide as appropriate  - Encourage maximum independence but intervene and supervise when necessary  - Involve family in performance of ADLs  - Assess for home care needs following discharge   - Consider OT consult to assist with ADL evaluation and planning for discharge  - Provide patient education as appropriate  Outcome: Progressing  Goal: Maintain proper alignment of affected body part  Description: INTERVENTIONS:  - Support, maintain and protect limb and body alignment  - Provide patient/ family with appropriate education  Outcome: Progressing     Problem: Prexisting or High Potential for Compromised Skin Integrity  Goal: Skin integrity is maintained or improved  Description: INTERVENTIONS:  - Identify patients at risk for skin breakdown  - Assess and monitor skin integrity  - Assess and monitor nutrition and hydration status  - Monitor labs   - Assess for incontinence   - Turn and reposition patient  - Assist with mobility/ambulation  - Relieve pressure over bony prominences  - Avoid friction and shearing  - Provide appropriate hygiene as needed including keeping skin clean and dry  - Evaluate need for skin moisturizer/barrier cream  - Collaborate with interdisciplinary team   - Patient/family teaching  - Consider wound care  consult   Outcome: Progressing     Problem: Nutrition/Hydration-ADULT  Goal: Nutrient/Hydration intake appropriate for improving, restoring or maintaining nutritional needs  Description: Monitor and assess patient's nutrition/hydration status for malnutrition. Collaborate with interdisciplinary team and initiate plan and interventions as ordered.  Monitor patient's weight and dietary intake as ordered or per policy. Utilize nutrition screening tool and intervene as necessary. Determine patient's food preferences and provide high-protein, high-caloric foods as appropriate.     INTERVENTIONS:  - Monitor oral intake, urinary output, labs, and treatment plans  - Assess nutrition and hydration status and recommend course of action  - Evaluate amount of meals eaten  - Assist patient with eating if necessary   - Allow adequate time for meals  - Recommend/ encourage appropriate diets, oral nutritional supplements, and vitamin/mineral supplements  - Order, calculate, and assess calorie counts as needed  - Recommend, monitor, and adjust tube feedings and TPN/PPN based on assessed needs  - Assess need for intravenous fluids  - Provide specific nutrition/hydration education as appropriate  - Include patient/family/caregiver in decisions related to nutrition  Outcome: Progressing

## 2024-05-27 NOTE — NURSING NOTE
"Patient removed previously placed IV in right arm during potassium chloride infusion due to pain/discomfort at site. Patient stating she doesn't want to be bothered with further IV placement or lab work. Reviewed patient hemoglobin level and current order to monitor her hemoglobin. Patient believed she had already received blood product this admission, patient correct and informed of need for PRBCs and current downward trend of hemoglobins since that time. Patient informed of need to monitor hemoglobin status to inform decision to restart eliquis for history of atrial fibrillation. Patient monitored on telemetry, currently in a. Fib. Patient states \"I'm 88 years old, what do I have to live for? What do I need to see?\" Patient poorly tolerated placement of new IV access to right antecubital space, patient agitated and states to this nurse \"you won't touch me the rest of the day, this is it\". Successful placement of IV after first attempt. Patient would not allow this nurse to attempt to obtain lab work at this time.   "

## 2024-05-27 NOTE — ASSESSMENT & PLAN NOTE
History of A-fib -presented with a heart rate in the 140s  Reports that she has not been taking any of her medicine for the last few weeks  RC: Metoprolol tartate 12.5 Mg twice daily  AC: Eliquis 2.5 Mg twice daily  Hold home Eliquis in setting of severe anemia pending stability of hgb  Intermittent high HR but improved on current dose of metoprolol

## 2024-05-27 NOTE — PLAN OF CARE
Problem: PAIN - ADULT  Goal: Verbalizes/displays adequate comfort level or baseline comfort level  Description: Interventions:  - Encourage patient to monitor pain and request assistance  - Assess pain using appropriate pain scale  - Administer analgesics based on type and severity of pain and evaluate response  - Implement non-pharmacological measures as appropriate and evaluate response  - Consider cultural and social influences on pain and pain management  - Notify physician/advanced practitioner if interventions unsuccessful or patient reports new pain  Outcome: Progressing     Problem: INFECTION - ADULT  Goal: Absence or prevention of progression during hospitalization  Description: INTERVENTIONS:  - Assess and monitor for signs and symptoms of infection  - Monitor lab/diagnostic results  - Monitor all insertion sites, i.e. indwelling lines, tubes, and drains  - Monitor endotracheal if appropriate and nasal secretions for changes in amount and color  - [place] appropriate cooling/warming therapies per order  - Administer medications as ordered  - Instruct and encourage patient and family to use good hand hygiene technique  - Identify and instruct in appropriate isolation precautions for identified infection/condition  Outcome: Progressing     Problem: MUSCULOSKELETAL - ADULT  Goal: Maintain or return mobility to safest level of function  Description: INTERVENTIONS:  - Assess patient's ability to carry out ADLs; assess patient's baseline for ADL function and identify physical deficits which impact ability to perform ADLs (bathing, care of mouth/teeth, toileting, grooming, dressing, etc.)  - Assess/evaluate cause of self-care deficits   - Assess range of motion  - Assess patient's mobility  - Assess patient's need for assistive devices and provide as appropriate  - Encourage maximum independence but intervene and supervise when necessary  - Involve family in performance of ADLs  - Assess for home care needs  following discharge   - Consider OT consult to assist with ADL evaluation and planning for discharge  - Provide patient education as appropriate  Outcome: Progressing     Problem: HEMATOLOGIC - ADULT  Goal: Maintains hematologic stability  Description: INTERVENTIONS  - Assess for signs and symptoms of bleeding or hemorrhage  - Monitor labs  - Administer supportive blood products/factors as ordered and appropriate  Outcome: Progressing     Problem: Nutrition/Hydration-ADULT  Goal: Nutrient/Hydration intake appropriate for improving, restoring or maintaining nutritional needs  Description: Monitor and assess patient's nutrition/hydration status for malnutrition. Collaborate with interdisciplinary team and initiate plan and interventions as ordered.  Monitor patient's weight and dietary intake as ordered or per policy. Utilize nutrition screening tool and intervene as necessary. Determine patient's food preferences and provide high-protein, high-caloric foods as appropriate.     INTERVENTIONS:  - Monitor oral intake, urinary output, labs, and treatment plans  - Assess nutrition and hydration status and recommend course of action  - Evaluate amount of meals eaten  - Assist patient with eating if necessary   - Allow adequate time for meals  - Recommend/ encourage appropriate diets, oral nutritional supplements, and vitamin/mineral supplements  - Order, calculate, and assess calorie counts as needed  - Recommend, monitor, and adjust tube feedings and TPN/PPN based on assessed needs  - Assess need for intravenous fluids  - Provide specific nutrition/hydration education as appropriate  - Include patient/family/caregiver in decisions related to nutrition  Outcome: Progressing

## 2024-05-27 NOTE — ASSESSMENT & PLAN NOTE
Recent Labs     05/25/24  0502 05/26/24  0314 05/27/24  0228   HGB 6.8* 8.8* 7.7*        Hgb 3.3 - presented in Afib with RVR but BP stable - suspect slow GI bleed   Patient asymptomatic  On review of SNF paperwork-patient taking Eliquis 2.5 Mg BID and ASA 325mg q8h -hold both of these medications  S/P 3 unit PRBC since admission   Iron panel showed severe iron deficiency anemia  Protonix 40 Mg IV twice daily started-continue  Completed Venofer x 3 days, now on oral iron   EGD/colonoscopy completed 5/26: Mild to moderate gastritis.  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  Some decrease in hgb to 7.7 early this AM no reports of bleeding per nursing.  Will repeat H/H now.  If stable, consider resuming eliquis

## 2024-05-27 NOTE — PROGRESS NOTES
"[facility]  Progress Note  Name: Amanda Gonzalez I  MRN: [id number]  Unit/Bed#: -01 I Date of Admission: 5/23/2024   Date of Service: 5/27/2024 I Hospital Day: 4    Assessment & Plan   * Anemia  Assessment & Plan  Recent Labs     05/25/24  0502 05/26/24  0314 05/27/24  0228   HGB 6.8* 8.8* 7.7*        Hgb 3.3 - presented in Afib with RVR but BP stable - suspect slow GI bleed   Patient asymptomatic  On review of SNF paperwork-patient taking Eliquis 2.5 Mg BID and ASA 325mg q8h -hold both of these medications  S/P 3 unit PRBC since admission   Iron panel showed severe iron deficiency anemia  Protonix 40 Mg IV twice daily started-continue  Completed Venofer x 3 days, now on oral iron   EGD/colonoscopy completed 5/26: Mild to moderate gastritis.  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  Some decrease in hgb to 7.7 early this AM no reports of bleeding per nursing.  Will repeat H/H now.  If stable, consider resuming eliquis      Abnormal CT scan, gallbladder  Assessment & Plan  CT A/P: \"Distended gallbladder with gallstone and possible wall thickening.\"  Patient denies any abdominal pain, no RUQ tenderness on exam  No transaminitis , bilirubin or elevated alk phos  Hold on RUQ US at this time    Abnormal EKG  Assessment & Plan  EKG showing ST depressions inferior and anterolaterally, did improve slightly after rate control  Echo showed LVEF 55%, diastolic dysfunction 2, mild aortic regurgitation, moderate mitral regurgitation, moderate to severe tricuspid regurgitation, right ventricular systolic pressure 30, IVC normal.  No pericardial effusion.      Fall  Assessment & Plan  Reports a fall either 5 to 8 weeks ago with head strike from wheelchair, states that she was never evaluated since the fall  She denies any pain, but states that she has been primarily bedbound since fall  CT C/A/P without any evidence of trauma  Fall precautions  PT OT consult    Atrial fibrillation " with RVR (HCC)  Assessment & Plan  History of A-fib -presented with a heart rate in the 140s  Reports that she has not been taking any of her medicine for the last few weeks  RC: Metoprolol tartate 12.5 Mg twice daily  AC: Eliquis 2.5 Mg twice daily  Hold home Eliquis in setting of severe anemia pending stability of hgb  Intermittent high HR but improved on current dose of metoprolol    Hypertension  Assessment & Plan  Home regimen: Metoprolol tartate 12.5mg BID and lisinopril 10 Mg daily  However patient noncompliant with medications  Currently on metoprolol only. Continue metoprolol and monitor  Blood pressure stable         VTE Pharmacologic Prophylaxis: VTE Score: 3 Moderate Risk (Score 3-4) - Pharmacological DVT Prophylaxis Contraindicated. Sequential Compression Devices Ordered.    Mobility:   Basic Mobility Inpatient Raw Score: 9  JH-HLM Goal: 3: Sit at edge of bed  JH-HLM Achieved: 2: Bed activities/Dependent transfer  JH-HLM Goal NOT achieved. Continue with multidisciplinary rounding and encourage appropriate mobility to improve upon JH-HLM goals.    Patient Centered Rounds: I performed bedside rounds with nursing staff today.   Discussions with Specialists or Other Care Team Provider: GI AP    Education and Discussions with Family / Patient: Attempted to update  (daughter) via phone. Left voicemail.     Total Time Spent on Date of Encounter in care of patient: 40 mins. This time was spent on one or more of the following: performing physical exam; counseling and coordination of care; obtaining or reviewing history; documenting in the medical record; reviewing/ordering tests, medications or procedures; communicating with other healthcare professionals and discussing with patient's family/caregivers.    Current Length of Stay: 4 day(s)  Current Patient Status: Inpatient   Certification Statement: The patient will continue to require additional inpatient hospital stay due to monitoring  hgb  Discharge Plan: Anticipate discharge in 24-48 hrs to discharge location to be determined pending rehab evaluations.    Code Status: Level 3 - DNAR and DNI    Subjective:   Patient feels well this morning.  No events overnight per nursing.  Denies chest pain/palpitations, shortness of breath, nausea/vomiting, abdominal pain, fever/chills.    Objective:     Vitals:   Temp (24hrs), Av.9 °F (36.6 °C), Min:97.5 °F (36.4 °C), Max:98.2 °F (36.8 °C)    Temp:  [97.5 °F (36.4 °C)-98.2 °F (36.8 °C)] 97.5 °F (36.4 °C)  HR:  [] 88  Resp:  [12-20] 18  BP: (119-139)/(67-99) 139/83  SpO2:  [93 %-98 %] 96 %  Body mass index is 19.3 kg/m².     Input and Output Summary (last 24 hours):     Intake/Output Summary (Last 24 hours) at 2024 1106  Last data filed at 2024 0937  Gross per 24 hour   Intake 1020 ml   Output --   Net 1020 ml       Physical Exam:   Physical Exam  Vitals and nursing note reviewed.   Constitutional:       Appearance: Normal appearance.      Comments: No acute distress   HENT:      Head: Normocephalic.   Eyes:      General: No scleral icterus.     Extraocular Movements: Extraocular movements intact.      Conjunctiva/sclera: Conjunctivae normal.   Cardiovascular:      Rate and Rhythm: Normal rate. Rhythm irregularly irregular.   Pulmonary:      Effort: Pulmonary effort is normal.      Breath sounds: Normal breath sounds. No wheezing, rhonchi or rales.   Abdominal:      General: Bowel sounds are normal.      Palpations: Abdomen is soft.      Tenderness: There is no abdominal tenderness. There is no guarding or rebound.   Musculoskeletal:         General: No swelling, tenderness or deformity.      Cervical back: Normal range of motion.      Comments: Able to move upper/lower ext bilaterally, no edema   Skin:     General: Skin is warm and dry.   Neurological:      Mental Status: She is alert. Mental status is at baseline.   Psychiatric:         Mood and Affect: Mood normal.         Speech: Speech  normal.         Behavior: Behavior normal.          Additional Data:     Labs:  Results from last 7 days   Lab Units 05/27/24  0228 05/24/24  0026 05/23/24  1657   WBC Thousand/uL 3.93*   < > 4.57   HEMOGLOBIN g/dL 7.7*   < > 3.3*   HEMATOCRIT % 27.4*   < > 12.7*   PLATELETS Thousands/uL 238   < > 398*   SEGS PCT %  --   --  66   LYMPHO PCT %  --   --  22   MONO PCT %  --   --  10   EOS PCT %  --   --  1    < > = values in this interval not displayed.     Results from last 7 days   Lab Units 05/27/24  0228 05/26/24  0314 05/25/24  0502   SODIUM mmol/L 136   < > 135   POTASSIUM mmol/L 3.2*   < > 3.7   CHLORIDE mmol/L 107   < > 109*   CO2 mmol/L 20*   < > 19*   BUN mg/dL 11   < > 20   CREATININE mg/dL 0.61   < > 0.72   ANION GAP mmol/L 9   < > 7   CALCIUM mg/dL 8.3*   < > 8.6   ALBUMIN g/dL  --   --  3.1*   TOTAL BILIRUBIN mg/dL  --   --  0.81   ALK PHOS U/L  --   --  36   ALT U/L  --   --  7   AST U/L  --   --  13   GLUCOSE RANDOM mg/dL 105   < > 89    < > = values in this interval not displayed.     Results from last 7 days   Lab Units 05/23/24  1657   INR  1.36*             Results from last 7 days   Lab Units 05/23/24  1657   LACTIC ACID mmol/L 1.5   PROCALCITONIN ng/ml 0.12       Lines/Drains:  Invasive Devices       None                     Telemetry:  Telemetry Orders (From admission, onward)               24 Hour Telemetry Monitoring  Continuous x 24 Hours (Telem)        Question:  Reason for 24 Hour Telemetry  Answer:  Arrhythmias requiring acute medical intervention / PPM or ICD malfunction                     Telemetry Reviewed: Atrial fibrillation. HR averaging 90  Indication for Continued Telemetry Use: Arrthymias requiring medical therapy             Imaging: Reviewed radiology reports from this admission including: procedure reports    Recent Cultures (last 7 days):   Results from last 7 days   Lab Units 05/23/24  1905 05/23/24  1657   BLOOD CULTURE   --  No Growth at 72 hrs.  No Growth at 72 hrs.    URINE CULTURE  >100,000 cfu/ml Aerococcus species*  <10,000 cfu/ml  --        Last 24 Hours Medication List:   Current Facility-Administered Medications   Medication Dose Route Frequency Provider Last Rate    acetaminophen  650 mg Oral Q6H PRN Camille Montes MD      ferrous sulfate  325 mg Oral Daily With Breakfast Camille Montes MD      heparin (porcine)  5,000 Units Subcutaneous Q8H JITENDRA Montes MD      metoprolol  2.5 mg Intravenous Q6H PRN Camille Montes MD      metoprolol tartrate  12.5 mg Oral Q12H JITENDRA Camille Montes MD      ondansetron  4 mg Intravenous Q6H PRN Camille Montes MD      pantoprazole  40 mg Oral Early Morning Asael Galvez DO          Today, Patient Was Seen By: Sabrina Muhammad PA-C    **Please Note: This note may have been constructed using a voice recognition system.**

## 2024-05-27 NOTE — OCCUPATIONAL THERAPY NOTE
Occupational Therapy Cancel Note     Patient Name: Amanda Gonzalez  Today's Date: 5/27/2024  Problem List  Principal Problem:    Anemia  Active Problems:    Hypertension    Atrial fibrillation with RVR (HCC)    Other problems related to medical facilities and other health care    Fall    Abnormal EKG    Abnormal CT scan, gallbladder            05/27/24 1411   Note Type   Note type Cancelled Session   Cancel Reasons Refusal   Additional Comments 5/27: Pt adamantly refusing OT services this PM despite education on benefits d/t fatigue. Will f/u and re-attempt as able.

## 2024-05-27 NOTE — PROGRESS NOTES
"Progress note - [facility]   Amanda Gonzalez 88 y.o. female MRN: [id number]  Unit/Bed#: -01 Encounter: [id number]    ASSESSMENT and PLAN    88F presented to the ER with a fall.    found to have profound iron deficiency anemia with a hemoglobin of 3.3, MCV of 67, and ferritin of 2.  Hemoglobin was 10.4 six months ago.    1) Iron deficiency anemia:   Hemoglobin 3.3 on admission with ferritin of 2.  No overt signs of GI bleeding.  Received 3 units of PRBCs throughout this hospitalization, along with IV iron.    On 5/25/2024 EGD showed mild gastritis, biopsies are pending.  Colonoscopy showed diverticulosis.  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  The 8.8 may have been spurious, she received 1 unit of blood for hemoglobin of 6.8 the day prior.    Repeat hemoglobin pending this afternoon.  If stable, okay to resume Eliquis.    Plan outpatient capsule to assess for additional AVMs in the small bowel.     2) Atrial fibrillation with RVR  3) Abnormal EKG  Present on admission.  Unclear if she was taking her rate control medications.  Eliquis held for anemia and procedures.  If hemoglobin remained stable today okay to resume     Chief Complaint   Patient presents with    Medical Problem     Pt to er via ems from [facility] with reports from the patient that she fell out of a faulty wheelchair 5 or more weeks ago, and no one at the facility checked on her. Staff states that they activated 911 today because the patient was requesting to be seen in the er. Primary nurse at facility states that patient is at her baseline        SUBJECTIVE/HPI   Angry about IV replacement  Denies GI complaints.  No nausea, vomiting or rectal bleeding.    /83   Pulse 88   Temp 97.5 °F (36.4 °C)   Resp 18   Ht 5' 5\" (1.651 m)   Wt 52.6 kg (116 lb)   LMP  (LMP Unknown)   SpO2 96%   BMI 19.30 kg/m² " "    PHYSICALEXAM  General appearance: alert, appears stated age and cooperative  Eyes: PERLLA, EOMI, no icterus   Head: Normocephalic, without obvious abnormality, atraumatic  Lungs: clear to auscultation bilaterally  Heart: regular rate and rhythm, S1, S2 normal, no murmur, click, rub or gallop  Abdomen: soft, non-tender; bowel sounds normal; no masses,  no organomegaly  Extremities: extremities normal, atraumatic, no cyanosis or edema  Neurologic: Grossly normal    Lab Results   Component Value Date    GLUCOSE 84 05/12/2015    CALCIUM 8.3 (L) 05/27/2024     05/12/2015    K 3.2 (L) 05/27/2024    CO2 20 (L) 05/27/2024     05/27/2024    BUN 11 05/27/2024    CREATININE 0.61 05/27/2024     Lab Results   Component Value Date    WBC 3.93 (L) 05/27/2024    HGB 7.7 (L) 05/27/2024    HCT 27.4 (L) 05/27/2024    MCV 81 (L) 05/27/2024     05/27/2024     Lab Results   Component Value Date    ALT 7 05/25/2024    AST 13 05/25/2024    ALKPHOS 36 05/25/2024    BILITOT 0.56 05/12/2015     No results found for: \"AMYLASE\"  No results found for: \"LIPASE\"  Lab Results   Component Value Date    IRON <10 (L) 05/23/2024    TIBC  05/23/2024      Comment:      Unable to calculate.    FERRITIN 2 (L) 05/23/2024     Lab Results   Component Value Date    INR 1.36 (H) 05/23/2024     "

## 2024-05-27 NOTE — ASSESSMENT & PLAN NOTE
Reports a fall either 5 to 8 weeks ago with head strike from wheelchair, states that she was never evaluated since the fall  She denies any pain, but states that she has been primarily bedbound since fall  CT C/A/P without any evidence of trauma  Fall precautions  PT OT consult

## 2024-05-28 LAB
ANION GAP SERPL CALCULATED.3IONS-SCNC: 5 MMOL/L (ref 4–13)
BACTERIA BLD CULT: NORMAL
BACTERIA BLD CULT: NORMAL
BASOPHILS # BLD AUTO: 0.05 THOUSANDS/ÂΜL (ref 0–0.1)
BASOPHILS NFR BLD AUTO: 1 % (ref 0–1)
BUN SERPL-MCNC: 10 MG/DL (ref 5–25)
CALCIUM SERPL-MCNC: 8.5 MG/DL (ref 8.4–10.2)
CHLORIDE SERPL-SCNC: 109 MMOL/L (ref 96–108)
CO2 SERPL-SCNC: 21 MMOL/L (ref 21–32)
CREAT SERPL-MCNC: 0.62 MG/DL (ref 0.6–1.3)
EOSINOPHIL # BLD AUTO: 0.22 THOUSAND/ÂΜL (ref 0–0.61)
EOSINOPHIL NFR BLD AUTO: 6 % (ref 0–6)
ERYTHROCYTE [DISTWIDTH] IN BLOOD BY AUTOMATED COUNT: 25 % (ref 11.6–15.1)
GFR SERPL CREATININE-BSD FRML MDRD: 81 ML/MIN/1.73SQ M
GLUCOSE SERPL-MCNC: 89 MG/DL (ref 65–140)
HCT VFR BLD AUTO: 28 % (ref 34.8–46.1)
HGB BLD-MCNC: 8 G/DL (ref 11.5–15.4)
IMM GRANULOCYTES # BLD AUTO: 0.03 THOUSAND/UL (ref 0–0.2)
IMM GRANULOCYTES NFR BLD AUTO: 1 % (ref 0–2)
LYMPHOCYTES # BLD AUTO: 1.03 THOUSANDS/ÂΜL (ref 0.6–4.47)
LYMPHOCYTES NFR BLD AUTO: 26 % (ref 14–44)
MCH RBC QN AUTO: 23.3 PG (ref 26.8–34.3)
MCHC RBC AUTO-ENTMCNC: 28.6 G/DL (ref 31.4–37.4)
MCV RBC AUTO: 82 FL (ref 82–98)
MONOCYTES # BLD AUTO: 0.48 THOUSAND/ÂΜL (ref 0.17–1.22)
MONOCYTES NFR BLD AUTO: 12 % (ref 4–12)
NEUTROPHILS # BLD AUTO: 2.17 THOUSANDS/ÂΜL (ref 1.85–7.62)
NEUTS SEG NFR BLD AUTO: 54 % (ref 43–75)
NRBC BLD AUTO-RTO: 1 /100 WBCS
PLATELET # BLD AUTO: 226 THOUSANDS/UL (ref 149–390)
PMV BLD AUTO: 9.4 FL (ref 8.9–12.7)
POTASSIUM SERPL-SCNC: 4.6 MMOL/L (ref 3.5–5.3)
RBC # BLD AUTO: 3.43 MILLION/UL (ref 3.81–5.12)
SODIUM SERPL-SCNC: 135 MMOL/L (ref 135–147)
WBC # BLD AUTO: 3.98 THOUSAND/UL (ref 4.31–10.16)

## 2024-05-28 PROCEDURE — 80048 BASIC METABOLIC PNL TOTAL CA: CPT | Performed by: INTERNAL MEDICINE

## 2024-05-28 PROCEDURE — 99232 SBSQ HOSP IP/OBS MODERATE 35: CPT | Performed by: INTERNAL MEDICINE

## 2024-05-28 PROCEDURE — 85025 COMPLETE CBC W/AUTO DIFF WBC: CPT | Performed by: INTERNAL MEDICINE

## 2024-05-28 PROCEDURE — 97530 THERAPEUTIC ACTIVITIES: CPT

## 2024-05-28 PROCEDURE — 99232 SBSQ HOSP IP/OBS MODERATE 35: CPT | Performed by: PHYSICIAN ASSISTANT

## 2024-05-28 RX ADMIN — APIXABAN 2.5 MG: 2.5 TABLET, FILM COATED ORAL at 08:10

## 2024-05-28 RX ADMIN — APIXABAN 2.5 MG: 2.5 TABLET, FILM COATED ORAL at 17:07

## 2024-05-28 RX ADMIN — ACETAMINOPHEN 650 MG: 325 TABLET, FILM COATED ORAL at 15:09

## 2024-05-28 RX ADMIN — METOPROLOL TARTRATE 25 MG: 25 TABLET, FILM COATED ORAL at 20:31

## 2024-05-28 RX ADMIN — PANTOPRAZOLE SODIUM 40 MG: 40 TABLET, DELAYED RELEASE ORAL at 06:09

## 2024-05-28 RX ADMIN — METOPROLOL TARTRATE 25 MG: 25 TABLET, FILM COATED ORAL at 08:10

## 2024-05-28 RX ADMIN — ACETAMINOPHEN 650 MG: 325 TABLET, FILM COATED ORAL at 08:10

## 2024-05-28 RX ADMIN — FERROUS SULFATE TAB 325 MG (65 MG ELEMENTAL FE) 325 MG: 325 (65 FE) TAB at 08:10

## 2024-05-28 NOTE — PLAN OF CARE
Problem: Potential for Falls  Goal: Patient will remain free of falls  Description: INTERVENTIONS:  - Educate patient/family on patient safety including physical limitations  - Instruct patient to call for assistance with activity   - Consult OT/PT to assist with strengthening/mobility   - Keep Call bell within reach  - Keep bed low and locked with side rails adjusted as appropriate  - Keep care items and personal belongings within reach  - Initiate and maintain comfort rounds  - Make Fall Risk Sign visible to staff  - Offer Toileting every 2 Hours, in advance of need  - Initiate/Maintain bed/chair alarm  - Obtain necessary fall risk management equipment:   - Apply yellow socks and bracelet for high fall risk patients  - Consider moving patient to room near nurses station  Outcome: Progressing     Problem: PAIN - ADULT  Goal: Verbalizes/displays adequate comfort level or baseline comfort level  Description: Interventions:  - Encourage patient to monitor pain and request assistance  - Assess pain using appropriate pain scale  - Administer analgesics based on type and severity of pain and evaluate response  - Implement non-pharmacological measures as appropriate and evaluate response  - Consider cultural and social influences on pain and pain management  - Notify physician/advanced practitioner if interventions unsuccessful or patient reports new pain  Outcome: Progressing     Problem: INFECTION - ADULT  Goal: Absence or prevention of progression during hospitalization  Description: INTERVENTIONS:  - Assess and monitor for signs and symptoms of infection  - Monitor lab/diagnostic results  - Monitor all insertion sites, i.e. indwelling lines, tubes, and drains  - Monitor endotracheal if appropriate and nasal secretions for changes in amount and color  - [place] appropriate cooling/warming therapies per order  - Administer medications as ordered  - Instruct and encourage patient and family to use good hand hygiene  technique  - Identify and instruct in appropriate isolation precautions for identified infection/condition  Outcome: Progressing  Goal: Absence of fever/infection during neutropenic period  Description: INTERVENTIONS:  - Monitor WBC    Outcome: Progressing     Problem: SAFETY ADULT  Goal: Patient will remain free of falls  Description: INTERVENTIONS:  - Educate patient/family on patient safety including physical limitations  - Instruct patient to call for assistance with activity   - Consult OT/PT to assist with strengthening/mobility   - Keep Call bell within reach  - Keep bed low and locked with side rails adjusted as appropriate  - Keep care items and personal belongings within reach  - Initiate and maintain comfort rounds  - Make Fall Risk Sign visible to staff  - Offer Toileting every 2 Hours, in advance of need  - Initiate/Maintain bed/chair alarm  - Obtain necessary fall risk management equipment:   - Apply yellow socks and bracelet for high fall risk patients  - Consider moving patient to room near nurses station  Outcome: Progressing  Goal: Maintain or return to baseline ADL function  Description: INTERVENTIONS:  - Educate patient/family on patient safety including physical limitations  - Instruct patient to call for assistance with activity   - Consult OT/PT to assist with strengthening/mobility   - Keep Call bell within reach  - Keep bed low and locked with side rails adjusted as appropriate  - Keep care items and personal belongings within reach  - Initiate and maintain comfort rounds  - Make Fall Risk Sign visible to staff  - Offer Toileting every 2 Hours, in advance of need  - Initiate/Maintain bed/chair alarm  - Obtain necessary fall risk management equipment:   - Apply yellow socks and bracelet for high fall risk patients  - Consider moving patient to room near nurses station  Outcome: Progressing  Goal: Maintains/Returns to pre admission functional level  Description: INTERVENTIONS:  - Perform  AM-PAC 6 Click Basic Mobility/ Daily Activity assessment daily.  - Set and communicate daily mobility goal to care team and patient/family/caregiver.   - Collaborate with rehabilitation services on mobility goals if consulted  - Perform Range of Motion 3 times a day.  - Reposition patient every 3 hours.  - Dangle patient 3 times a day  - Stand patient 3 times a day  - Ambulate patient 3 times a day  - Out of bed to chair 3 times a day   - Out of bed for meals 3 times a day  - Out of bed for toileting  - Record patient progress and toleration of activity level   Outcome: Progressing     Problem: DISCHARGE PLANNING  Goal: Discharge to home or other facility with appropriate resources  Description: INTERVENTIONS:  - Identify barriers to discharge w/patient and caregiver  - Arrange for needed discharge resources and transportation as appropriate  - Identify discharge learning needs (meds, wound care, etc.)  - Arrange for interpretive services to assist at discharge as needed  - Refer to Case Management Department for coordinating discharge planning if the patient needs post-hospital services based on physician/advanced practitioner order or complex needs related to functional status, cognitive ability, or social support system  Outcome: Progressing     Problem: Knowledge Deficit  Goal: Patient/family/caregiver demonstrates understanding of disease process, treatment plan, medications, and discharge instructions  Description: Complete learning assessment and assess knowledge base.  Interventions:  - Provide teaching at level of understanding  - Provide teaching via preferred learning methods  Outcome: Progressing     Problem: CARDIOVASCULAR - ADULT  Goal: Maintains optimal cardiac output and hemodynamic stability  Description: INTERVENTIONS:  - Monitor I/O, vital signs and rhythm  - Monitor for S/S and trends of decreased cardiac output  - Administer and titrate ordered vasoactive medications to optimize hemodynamic  stability  - Assess quality of pulses, skin color and temperature  - Assess for signs of decreased coronary artery perfusion  - Instruct patient to report change in severity of symptoms  Outcome: Progressing  Goal: Absence of cardiac dysrhythmias or at baseline rhythm  Description: INTERVENTIONS:  - Continuous cardiac monitoring, vital signs, obtain 12 lead EKG if ordered  - Administer antiarrhythmic and heart rate control medications as ordered  - Monitor electrolytes and administer replacement therapy as ordered  Outcome: Progressing     Problem: RESPIRATORY - ADULT  Goal: Achieves optimal ventilation and oxygenation  Description: INTERVENTIONS:  - Assess for changes in respiratory status  - Assess for changes in mentation and behavior  - Position to facilitate oxygenation and minimize respiratory effort  - Oxygen administered by appropriate delivery if ordered  - Initiate smoking cessation education as indicated  - Encourage broncho-pulmonary hygiene including cough, deep breathe, Incentive Spirometry  - Assess the need for suctioning and aspirate as needed  - Assess and instruct to report SOB or any respiratory difficulty  - Respiratory Therapy support as indicated  Outcome: Progressing     Problem: SKIN/TISSUE INTEGRITY - ADULT  Goal: Skin Integrity remains intact(Skin Breakdown Prevention)  Description: Assess:  -Perform Sebastien assessment every shift and PRN  -Clean and moisturize skin every shift and PRN  -Inspect skin when repositioning, toileting, and assisting with ADLS  -Assess under medical devices   -Assess extremities for adequate circulation and sensation     Bed Management:  -Have minimal linens on bed & keep smooth, unwrinkled  -Change linens as needed when moist or perspiring  -Avoid sitting or lying in one position for more than 2 hours while in bed  -Keep HOB at 30 degrees     Toileting:  -Offer bedside commode  -Assess for incontinence every shift and PRN  -Use incontinent care products after  each incontinent episode     Activity:  -Mobilize patient 3 times a day  -Encourage activity and walks on unit  -Encourage or provide ROM exercises   -Turn and reposition patient every 2 Hours  -Use appropriate equipment to lift or move patient in bed  -Instruct/ Assist with weight shifting every 2 hours when out of bed in chair  -Consider limitation of chair time 2 hour intervals    Skin Care:  -Avoid use of baby powder, tape, friction and shearing, hot water or constrictive clothing  -Relieve pressure over bony prominences using waffle cushion, pillows  -Do not massage red bony areas    Next Steps:  -Teach patient strategies to minimize risks    -Consider consults to  interdisciplinary teams   Outcome: Progressing  Goal: Incision(s), wounds(s) or drain site(s) healing without S/S of infection  Description: INTERVENTIONS  - Assess and document dressing, incision, wound bed, drain sites and surrounding tissue  - Provide patient and family education  - Perform skin care/dressing changes every shift and PRN  Outcome: Progressing  Goal: Pressure injury heals and does not worsen  Description: Interventions:  - Implement low air loss mattress or specialty surface (Criteria met)  - Apply silicone foam dressing  - Instruct/assist with weight shifting every 120 minutes when in chair   - Limit chair time to 2 hour intervals  - Use special pressure reducing interventions such as waffle cushion when in chair   - Apply fecal or urinary incontinence containment device   - Perform passive or active ROM every shift and PRN  - Turn and reposition patient & offload bony prominences every 2 hours   - Utilize friction reducing device or surface for transfers   - Consider consults to  interdisciplinary teams   - Use incontinent care products after each incontinent episode  - Consider nutrition services referral as needed  Outcome: Progressing     Problem: HEMATOLOGIC - ADULT  Goal: Maintains hematologic stability  Description:  INTERVENTIONS  - Assess for signs and symptoms of bleeding or hemorrhage  - Monitor labs  - Administer supportive blood products/factors as ordered and appropriate  Outcome: Progressing     Problem: MUSCULOSKELETAL - ADULT  Goal: Maintain or return mobility to safest level of function  Description: INTERVENTIONS:  - Assess patient's ability to carry out ADLs; assess patient's baseline for ADL function and identify physical deficits which impact ability to perform ADLs (bathing, care of mouth/teeth, toileting, grooming, dressing, etc.)  - Assess/evaluate cause of self-care deficits   - Assess range of motion  - Assess patient's mobility  - Assess patient's need for assistive devices and provide as appropriate  - Encourage maximum independence but intervene and supervise when necessary  - Involve family in performance of ADLs  - Assess for home care needs following discharge   - Consider OT consult to assist with ADL evaluation and planning for discharge  - Provide patient education as appropriate  Outcome: Progressing  Goal: Maintain proper alignment of affected body part  Description: INTERVENTIONS:  - Support, maintain and protect limb and body alignment  - Provide patient/ family with appropriate education  Outcome: Progressing     Problem: Prexisting or High Potential for Compromised Skin Integrity  Goal: Skin integrity is maintained or improved  Description: INTERVENTIONS:  - Identify patients at risk for skin breakdown  - Assess and monitor skin integrity  - Assess and monitor nutrition and hydration status  - Monitor labs   - Assess for incontinence   - Turn and reposition patient  - Assist with mobility/ambulation  - Relieve pressure over bony prominences  - Avoid friction and shearing  - Provide appropriate hygiene as needed including keeping skin clean and dry  - Evaluate need for skin moisturizer/barrier cream  - Collaborate with interdisciplinary team   - Patient/family teaching  - Consider wound care  consult   Outcome: Progressing     Problem: Nutrition/Hydration-ADULT  Goal: Nutrient/Hydration intake appropriate for improving, restoring or maintaining nutritional needs  Description: Monitor and assess patient's nutrition/hydration status for malnutrition. Collaborate with interdisciplinary team and initiate plan and interventions as ordered.  Monitor patient's weight and dietary intake as ordered or per policy. Utilize nutrition screening tool and intervene as necessary. Determine patient's food preferences and provide high-protein, high-caloric foods as appropriate.     INTERVENTIONS:  - Monitor oral intake, urinary output, labs, and treatment plans  - Assess nutrition and hydration status and recommend course of action  - Evaluate amount of meals eaten  - Assist patient with eating if necessary   - Allow adequate time for meals  - Recommend/ encourage appropriate diets, oral nutritional supplements, and vitamin/mineral supplements  - Order, calculate, and assess calorie counts as needed  - Recommend, monitor, and adjust tube feedings and TPN/PPN based on assessed needs  - Assess need for intravenous fluids  - Provide specific nutrition/hydration education as appropriate  - Include patient/family/caregiver in decisions related to nutrition  Outcome: Progressing

## 2024-05-28 NOTE — CASE MANAGEMENT
[facility] received request for authorization from Care Manager.  Authorization request submitted for: SNF  Facility Name: [facility]   NPI:[id number]  Facility MD: Dr Troy Johns     NPI: [id number]  Authorization initiated by contacting insurance:  [employer]  Via: [facility] Portal   Clinicals submitted via [employer] attachment   Pending Reference #:[id number]    Care Manager notified: Marjan Tiwari      Updates to authorization status will be noted in chart. Please reach out to CM for updates on any clinical information.

## 2024-05-28 NOTE — PLAN OF CARE
Problem: PHYSICAL THERAPY ADULT  Goal: Performs mobility at highest level of function for planned discharge setting.  See evaluation for individualized goals.  Description: Treatment/Interventions: Functional transfer training, LE strengthening/ROM, Therapeutic exercise, Endurance training, Cognitive reorientation, Patient/family training, Equipment eval/education, Bed mobility, Spoke to nursing, Gait training, OT          See flowsheet documentation for full assessment, interventions and recommendations.  Outcome: Progressing  Note: Prognosis: Guarded  Problem List: Decreased strength, Decreased range of motion, Decreased endurance, Impaired balance, Decreased mobility, Decreased cognition, Impaired judgement, Decreased safety awareness  Assessment: Pt received supine in bed, agreeable to therapy session. Pt needed min assist for bed mobility today but demonstrated improvement in sit to stand transfer and ability to pivot OOB to bedside chair, needing mod assist x2. Pt remains limited by fatigue, decreased motor planning, BLE weakness, decreased balance and limited endurance. The patient's AM-PAC Basic Mobility Inpatient Short Form Raw Score is 11. A Raw score of less than or equal to 17 suggests the patient may benefit from discharge to post-acute rehabilitation services. Please also refer to the recommendation of the Physical Therapist for safe discharge planning. Based on current status, Level 2 rehab intensity is recommended at time of discharge. Will continue to follow during hospitalization to maximize independence and functional gains.  Barriers to Discharge: None     Rehab Resource Intensity Level, PT: II (Moderate Resource Intensity)    See flowsheet documentation for full assessment.

## 2024-05-28 NOTE — CASE MANAGEMENT
Case Management Progress Note    Patient name Amanda Gonzalez  Location /-01 MRN [id number]  : 1935 Date 2024       LOS (days): 5  Geometric Mean LOS (GMLOS) (days): 3  Days to GMLOS:-1.8        OBJECTIVE:        Current admission status: Inpatient  Preferred Pharmacy:   [facility] [address] [address]  Phone: [phone] Fax: [phone]    [facility] [place], [place] - [address] [address]  Phone: [phone] Fax: [phone]    Primary Care Provider: Leila Mcknight DO    [facility]  Secondary Insurance: [facility]    PROGRESS NOTE:  Updated Luis Marti of tentative discharge date for . SNF auth request sent to  discharge support for Luis Marti. Await determination.

## 2024-05-28 NOTE — PLAN OF CARE
Problem: OCCUPATIONAL THERAPY ADULT  Goal: Performs self-care activities at highest level of function for planned discharge setting.  See evaluation for individualized goals.  Outcome: Progressing  Note: Limitation: Decreased ADL status, Decreased Safe judgement during ADL, Decreased cognition, Decreased endurance, Decreased self-care trans, Decreased high-level ADLs, Mood limitation  Prognosis: Fair  Assessment: Pt seen for OT tx session with focus on functional balance, functional mobility, ADL status, and transfer safety. Patient agreeable to OT treatment session. Pt received supine in bed. Pt required total A for doffing/donning socks. Pt performed supine > sit with S, Min A to scoot hips to EOB. Pt improved to requiring Mod Ax2 for sit <> stand & stand pivot txfer. Pt required set-up to don hat. Patient continues to be functioning below baseline level, occupational performance remains limited secondary to factors listed above, and pt at increased risk for falls and injury.  The patient's raw score on the AM-PAC Daily Activity inpatient short form is 15, standardized score is 34.69, less than 39.4. Patients at this level are likely to benefit from DC to post-acute rehabilitation services. Please refer to the recommendation of the Occupational Therapist for safe DC planning.  From OT standpoint, recommendation at time of D/C would be DC with Level II - Moderate Rehab Resource Intensity resources. Patient to benefit from continued Occupational Therapy treatment while in the hospital to address deficits as defined above and maximize level of functional independence with ADLs and functional mobility. Pt left seated OOB to Recliner with call bell in reach, tray table in reach, needs met, chair alarm activated, RN informed.     Rehab Resource Intensity Level, OT: II (Moderate Resource Intensity)

## 2024-05-28 NOTE — ASSESSMENT & PLAN NOTE
Recent Labs     05/27/24  0228 05/27/24  1430 05/28/24  0257   HGB 7.7* 8.6* 8.0*        Hgb 3.3 - presented in Afib with RVR but BP stable - suspect slow GI bleed   Patient asymptomatic  On review of SNF paperwork-patient taking Eliquis 2.5 Mg BID and ASA 325mg q8h - held on admit  S/P 3 unit PRBC since admission   Iron panel showed severe iron deficiency anemia  Protonix 40 Mg IV twice daily started-continue  Completed Venofer x 3 days, now on oral iron   EGD/colonoscopy completed 5/26: Mild to moderate gastritis.  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  Hgb on repeat stable - eliquis resumed evening of 5/27 without recurrence of bleeding  Discussed with GI AP - will monitor on AC today.  If hgb stable tomorrow without evidence of bleeding, may be medically ready for discharge tomorrow

## 2024-05-28 NOTE — ASSESSMENT & PLAN NOTE
Home regimen: Metoprolol tartate 12.5mg BID and lisinopril 10 Mg daily  However patient noncompliant with medications  Currently on metoprolol only. Continue metoprolol and monitor - increased 5/28 due to intermittent afib RVR  Blood pressure stable

## 2024-05-28 NOTE — PROGRESS NOTES
"[facility]  Progress Note  Name: Amanda Gonzalez I  MRN: [id number]  Unit/Bed#: -01 I Date of Admission: 5/23/2024   Date of Service: 5/28/2024 I Hospital Day: 5    Assessment & Plan   * Anemia  Assessment & Plan  Recent Labs     05/27/24  0228 05/27/24  1430 05/28/24  0257   HGB 7.7* 8.6* 8.0*        Hgb 3.3 - presented in Afib with RVR but BP stable - suspect slow GI bleed   Patient asymptomatic  On review of SNF paperwork-patient taking Eliquis 2.5 Mg BID and ASA 325mg q8h - held on admit  S/P 3 unit PRBC since admission   Iron panel showed severe iron deficiency anemia  Protonix 40 Mg IV twice daily started-continue  Completed Venofer x 3 days, now on oral iron   EGD/colonoscopy completed 5/26: Mild to moderate gastritis.  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  Hgb on repeat stable - eliquis resumed evening of 5/27 without recurrence of bleeding  Discussed with GI AP - will monitor on AC today.  If hgb stable tomorrow without evidence of bleeding, may be medically ready for discharge tomorrow      Abnormal CT scan, gallbladder  Assessment & Plan  CT A/P: \"Distended gallbladder with gallstone and possible wall thickening.\"  Patient denies any abdominal pain, no RUQ tenderness on exam  No transaminitis , bilirubin or elevated alk phos  Hold on RUQ US at this time    Fall  Assessment & Plan  Reports a fall either 5 to 8 weeks ago with head strike from wheelchair, states that she was never evaluated since the fall  She denies any pain, but states that she has been primarily bedbound since fall  CT C/A/P without any evidence of trauma  Fall precautions  PT OT consult    Atrial fibrillation with RVR (HCC)  Assessment & Plan  History of A-fib -presented with a heart rate in the 140s  Reports that she has not been taking any of her medicine for the last few weeks  RC: Metoprolol tartate 12.5 Mg twice daily  AC: Eliquis 2.5 Mg twice daily  Eliquis resumed evening of " 5/27  Intermittent high HR despite stability in hgb and no active bleeding, will increase metoprolol to 25 mg BID    Hypertension  Assessment & Plan  Home regimen: Metoprolol tartate 12.5mg BID and lisinopril 10 Mg daily  However patient noncompliant with medications  Currently on metoprolol only. Continue metoprolol and monitor - increased 5/28 due to intermittent afib RVR  Blood pressure stable         VTE Pharmacologic Prophylaxis: VTE Score: 3 Moderate Risk (Score 3-4) - Pharmacological DVT Prophylaxis Ordered: apixaban (Eliquis).    Mobility:   Basic Mobility Inpatient Raw Score: 9  -HLM Goal: 3: Sit at edge of bed  JH-HLM Achieved: 2: Bed activities/Dependent transfer  JH-HLM Goal NOT achieved. Continue with multidisciplinary rounding and encourage appropriate mobility to improve upon JH-HLM goals.    Patient Centered Rounds: I performed bedside rounds with nursing staff today.   Discussions with Specialists or Other Care Team Provider: CM, GI AP    Education and Discussions with Family / Patient: Updated  (daughter) via phone.    Total Time Spent on Date of Encounter in care of patient: 40 mins. This time was spent on one or more of the following: performing physical exam; counseling and coordination of care; obtaining or reviewing history; documenting in the medical record; reviewing/ordering tests, medications or procedures; communicating with other healthcare professionals and discussing with patient's family/caregivers.    Current Length of Stay: 5 day(s)  Current Patient Status: Inpatient   Certification Statement: The patient will continue to require additional inpatient hospital stay due to monitoring hgb  Discharge Plan: Anticipate discharge tomorrow to rehab facility.    Code Status: Level 3 - DNAR and DNI    Subjective:   Patient with irritable edge this morning, demanding her morning medications.  States she was incontinent of urine and needed to be changed.  Otherwise denies any  complaints.    Objective:     Vitals:   Temp (24hrs), Av °F (36.7 °C), Min:97.7 °F (36.5 °C), Max:98.5 °F (36.9 °C)    Temp:  [97.7 °F (36.5 °C)-98.5 °F (36.9 °C)] 97.7 °F (36.5 °C)  HR:  [] 89  Resp:  [16] 16  BP: (133-146)/(84-95) 146/95  SpO2:  [95 %-98 %] 98 %  Body mass index is 19.3 kg/m².     Input and Output Summary (last 24 hours):     Intake/Output Summary (Last 24 hours) at 2024 1028  Last data filed at 2024 0846  Gross per 24 hour   Intake 118 ml   Output --   Net 118 ml       Physical Exam:   Physical Exam  Vitals and nursing note reviewed.   Constitutional:       Appearance: Normal appearance.      Comments: No acute distress   HENT:      Head: Normocephalic.   Eyes:      General: No scleral icterus.     Extraocular Movements: Extraocular movements intact.      Conjunctiva/sclera: Conjunctivae normal.   Cardiovascular:      Rate and Rhythm: Tachycardia present. Rhythm irregularly irregular.   Pulmonary:      Effort: Pulmonary effort is normal.      Breath sounds: Normal breath sounds. No wheezing, rhonchi or rales.   Abdominal:      General: Bowel sounds are normal.      Palpations: Abdomen is soft.      Tenderness: There is no abdominal tenderness. There is no guarding or rebound.   Musculoskeletal:         General: No swelling, tenderness or deformity.      Cervical back: Normal range of motion.      Comments: Able to move upper/lower ext bilaterally, no edema   Skin:     General: Skin is warm and dry.   Neurological:      Mental Status: She is alert. Mental status is at baseline.   Psychiatric:         Mood and Affect: Affect is angry.          Additional Data:     Labs:  Results from last 7 days   Lab Units 24  0257   WBC Thousand/uL 3.98*   HEMOGLOBIN g/dL 8.0*   HEMATOCRIT % 28.0*   PLATELETS Thousands/uL 226   SEGS PCT % 54   LYMPHO PCT % 26   MONO PCT % 12   EOS PCT % 6     Results from last 7 days   Lab Units 24  0257 24  0314 24  0502   SODIUM  mmol/L 135   < > 135   POTASSIUM mmol/L 4.6   < > 3.7   CHLORIDE mmol/L 109*   < > 109*   CO2 mmol/L 21   < > 19*   BUN mg/dL 10   < > 20   CREATININE mg/dL 0.62   < > 0.72   ANION GAP mmol/L 5   < > 7   CALCIUM mg/dL 8.5   < > 8.6   ALBUMIN g/dL  --   --  3.1*   TOTAL BILIRUBIN mg/dL  --   --  0.81   ALK PHOS U/L  --   --  36   ALT U/L  --   --  7   AST U/L  --   --  13   GLUCOSE RANDOM mg/dL 89   < > 89    < > = values in this interval not displayed.     Results from last 7 days   Lab Units 05/23/24  1657   INR  1.36*             Results from last 7 days   Lab Units 05/23/24  1657   LACTIC ACID mmol/L 1.5   PROCALCITONIN ng/ml 0.12       Lines/Drains:  Invasive Devices       Peripheral Intravenous Line  Duration             Peripheral IV 05/27/24 Right Antecubital <1 day                      Telemetry:  Telemetry Orders (From admission, onward)               24 Hour Telemetry Monitoring  Continuous x 24 Hours (Telem)        Question:  Reason for 24 Hour Telemetry  Answer:  Arrhythmias requiring acute medical intervention / PPM or ICD malfunction                     Telemetry Reviewed: Atrial fibrillation. HR averaging 130  Indication for Continued Telemetry Use: Arrthymias requiring medical therapy             Imaging: Reviewed radiology reports from this admission including: procedure reports    Recent Cultures (last 7 days):   Results from last 7 days   Lab Units 05/23/24  1905 05/23/24  1657   BLOOD CULTURE   --  No Growth After 4 Days.  No Growth After 4 Days.   URINE CULTURE  >100,000 cfu/ml Aerococcus species*  <10,000 cfu/ml  --        Last 24 Hours Medication List:   Current Facility-Administered Medications   Medication Dose Route Frequency Provider Last Rate    acetaminophen  650 mg Oral Q6H PRN Camille Montes MD      apixaban  2.5 mg Oral BID Sabrina Muhammad PA-C      ferrous sulfate  325 mg Oral Daily With Breakfast Camille Montes MD      metoprolol  2.5 mg Intravenous Q6H PRN Camille  MD Jyoti      metoprolol tartrate  25 mg Oral Q12H [facility] Sabrina Muhammad PA-C      ondansetron  4 mg Intravenous Q6H PRN Camille Montes MD      pantoprazole  40 mg Oral Early Morning Asael Galvez DO          Today, Patient Was Seen By: Sabrina Muhammad PA-C    **Please Note: This note may have been constructed using a voice recognition system.**

## 2024-05-28 NOTE — OCCUPATIONAL THERAPY NOTE
"  Occupational Therapy Tx Note     Patient Name: Amanda Gonzalez  Today's Date: 5/28/2024  Problem List  Principal Problem:    Anemia  Active Problems:    Hypertension    Atrial fibrillation with RVR (HCC)    Other problems related to medical facilities and other health care    Fall    Abnormal EKG    Abnormal CT scan, gallbladder              05/28/24 1404   OT Last Visit   OT Visit Date 05/28/24   Note Type   Note Type Treatment for insurance authorization   Pain Assessment   Pain Assessment Tool 0-10   Pain Score No Pain   Restrictions/Precautions   Weight Bearing Precautions Per Order No   Other Precautions Cognitive;Chair Alarm;Bed Alarm;Fall Risk;Telemetry   ADL   Eating Assistance 7  Independent   Grooming Assistance 5  Supervision/Setup   Grooming Deficit Setup;Brushing hair   LB Dressing Assistance 1  Total Assistance   LB Dressing Deficit Don/doff R sock;Don/doff L sock   Bed Mobility   Supine to Sit 5  Supervision   Additional items HOB elevated;Bedrails;Increased time required;Verbal cues   Additional Comments Min A scooting toward EOB, increased time required   Transfers   Sit to Stand 3  Moderate assistance   Additional items Assist x 2;Increased time required;Verbal cues;Armrests   Stand to Sit 3  Moderate assistance   Additional items Assist x 2;Increased time required;Verbal cues;Armrests   Stand pivot 3  Moderate assistance   Additional items Assist x 2;Increased time required;Verbal cues;Armrests   Additional Comments Forward flexed posture. INcreased time required for sequencing/command following. Pt required tactile cues to advance LLE   Subjective   Subjective \"I have my had pinned so it stays out of my face\"   Cognition   Overall Cognitive Status Impaired   Arousal/Participation Alert   Attention Attends with cues to redirect   Orientation Level Oriented to person;Oriented to place   Memory Decreased recall of recent events;Decreased recall of precautions   Following Commands Follows one " step commands with increased time or repetition   Activity Tolerance   Activity Tolerance Patient tolerated treatment well   Medical Staff Made Aware PT KEYONA Nava Nathaniel   Assessment   Assessment Pt seen for OT tx session with focus on functional balance, functional mobility, ADL status, and transfer safety. Patient agreeable to OT treatment session. Pt received supine in bed. Pt required total A for doffing/donning socks. Pt performed supine > sit with S, Min A to scoot hips to EOB. Pt improved to requiring Mod Ax2 for sit <> stand & stand pivot txfer. Pt required set-up to don hat. Patient continues to be functioning below baseline level, occupational performance remains limited secondary to factors listed above, and pt at increased risk for falls and injury.  The patient's raw score on the AM-PAC Daily Activity inpatient short form is 15, standardized score is 34.69, less than 39.4. Patients at this level are likely to benefit from DC to post-acute rehabilitation services. Please refer to the recommendation of the Occupational Therapist for safe DC planning.  From OT standpoint, recommendation at time of D/C would be DC with Level II - Moderate Rehab Resource Intensity resources. Patient to benefit from continued Occupational Therapy treatment while in the hospital to address deficits as defined above and maximize level of functional independence with ADLs and functional mobility. Pt left seated OOB to Recliner with call bell in reach, tray table in reach, needs met, chair alarm activated, RN informed.   Plan   Treatment Interventions ADL retraining;Functional transfer training;UE strengthening/ROM;Endurance training;Patient/family training;Cognitive reorientation;Equipment evaluation/education;Compensatory technique education;Continued evaluation   Goal Expiration Date 06/03/24   OT Treatment Day 1   OT Frequency 3-5x/wk   Discharge Recommendation   Rehab Resource Intensity Level, OT II (Moderate Resource  Intensity)   AM-PAC Daily Activity Inpatient   Lower Body Dressing 1   Bathing 2   Toileting 2   Upper Body Dressing 3   Grooming 3   Eating 4   Daily Activity Raw Score 15   Daily Activity Standardized Score (Calc for Raw Score >=11) 34.69   AM-PAC Applied Cognition Inpatient   Following a Speech/Presentation 2   Understanding Ordinary Conversation 4   Taking Medications 2   Remembering Where Things Are Placed or Put Away 3   Remembering List of 4-5 Errands 2   Taking Care of Complicated Tasks 2   Applied Cognition Raw Score 15   Applied Cognition Standardized Score 33.54   End of Consult   Education Provided Yes   Patient Position at End of Consult Bedside chair;Bed/Chair alarm activated;All needs within reach   Nurse Communication Nurse aware of consult       Irena Campbell, OTR/L

## 2024-05-28 NOTE — PROGRESS NOTES
"Progress note - Gastroenterology   Amanda Gonzalez 88 y.o. female MRN: [id number]  Unit/Bed#: -01 Encounter: [id number]    ASSESSMENT and PLAN    88-year-old female presented to ED 5/23/2024 after fall at nursing facility  History of A-fib, on Eliquis which was held on admission  In ED noted to have profound iron deficiency anemia with hemoglobin 3.3.  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  Acute on chronic iron deficiency anemia  Nursing reports no BM, melena or rectal bleeding  Hemoglobin stable today at 8.0  Eliquis resumed yesterday 5/27  Patient denies abdominal pain, tolerating regular diet  -Continue to trend hemoglobin after resuming Eliquis 5/27  -Monitor Bms  -Continue pantoprazole 40 mg p.o. daily  -Continue daily iron supplement    2.  Atrial fibrillation with RVR  Unclear if patient was previously taking rate control medication  Eliquis resumed 5/27      Chief Complaint   Patient presents with    Medical Problem     Pt to er via ems from [facility] with reports from the patient that she fell out of a faulty wheelchair 5 or more weeks ago, and no one at the facility checked on her. Staff states that they activated 911 today because the patient was requesting to be seen in the er. Primary nurse at facility states that patient is at her baseline        SUBJECTIVE/HPI   No BM, melena or rectal bleeding per nursing  Hemoglobin stable at 8.0  Request resumed yesterday 5/27    /95   Pulse 89   Temp 97.7 °F (36.5 °C)   Resp 16   Ht 5' 5\" (1.651 m)   Wt 52.6 kg (116 lb)   LMP  (LMP Unknown)   SpO2 98%   BMI 19.30 kg/m²     PHYSICALEXAM  General appearance: alert, appears stated age and cooperative  Eyes: no icterus   Head: Normocephalic, without obvious abnormality, atraumatic  Lungs: clear to auscultation bilaterally  Heart: regular rate " and rhythm, S1, S2 normal, no murmur, click, rub or gallop  Abdomen: soft, non-tender; bowel sounds normal; no masses,  no organomegaly  Extremities: extremities normal, atraumatic, no cyanosis or edema  Neurologic: Grossly normal    Lab Results   Component Value Date    GLUCOSE 84 05/12/2015    CALCIUM 8.5 05/28/2024     05/12/2015    K 4.6 05/28/2024    CO2 21 05/28/2024     (H) 05/28/2024    BUN 10 05/28/2024    CREATININE 0.62 05/28/2024     Lab Results   Component Value Date    WBC 3.98 (L) 05/28/2024    HGB 8.0 (L) 05/28/2024    HCT 28.0 (L) 05/28/2024    MCV 82 05/28/2024     05/28/2024     Lab Results   Component Value Date    ALT 7 05/25/2024    AST 13 05/25/2024    ALKPHOS 36 05/25/2024    BILITOT 0.56 05/12/2015       Lab Results   Component Value Date    IRON <10 (L) 05/23/2024    TIBC  05/23/2024      Comment:      Unable to calculate.    FERRITIN 2 (L) 05/23/2024     Lab Results   Component Value Date    INR 1.36 (H) 05/23/2024

## 2024-05-28 NOTE — PHYSICAL THERAPY NOTE
"                                                                                  PHYSICAL THERAPY TREATMENT NOTE      Patient Name: Amanda Gonzalez  Today's Date: 5/28/2024 05/28/24 1350   PT Last Visit   PT Visit Date 05/28/24   Note Type   Note Type Treatment for insurance authorization   Pain Assessment   Pain Assessment Tool 0-10   Pain Score No Pain   Restrictions/Precautions   Weight Bearing Precautions Per Order No   Other Precautions Chair Alarm;Bed Alarm;Cognitive;Fall Risk;Telemetry   General   Chart Reviewed Yes   Family/Caregiver Present No   Cognition   Overall Cognitive Status Impaired   Arousal/Participation Alert   Attention Attends with cues to redirect   Orientation Level Oriented to person;Oriented to place   Memory Decreased recall of precautions;Decreased recall of recent events   Following Commands Follows one step commands with increased time or repetition   Comments needs verbal and tactile cues during mobility trials.   Subjective   Subjective \"I'll get up\"   Bed Mobility   Supine to Sit 4  Minimal assistance   Additional items Assist x 1;Increased time required;Verbal cues  ((+) retropulsion during attempts to scoot forward to EOB.)   Additional Comments min assist for scooting to EOB. increased time needed. cues for positioning   Transfers   Sit to Stand 3  Moderate assistance   Additional items Assist x 2;Increased time required;Verbal cues   Stand to Sit 3  Moderate assistance   Additional items Assist x 2;Armrests;Increased time required;Verbal cues   Stand pivot 3  Moderate assistance   Additional items Assist x 2;Armrests;Increased time required;Verbal cues   Additional Comments flexed posture and increased time for processing instructions and sequencing task. tactile cues needed for LE advancement.   Ambulation/Elevation   Gait pattern Knees flexed;Foward flexed;Excessively slow  (tactile and verbal cues provided for sequencing of task due to difficulty weight shifting and " motor planning transfer.)   Gait Assistance 3  Moderate assist   Additional items Assist x 2;Verbal cues;Tactile cues   Assistive Device   (no device. B trunk support)   Distance 2ft from bed to chair.   Balance   Static Sitting Fair +   Dynamic Sitting Fair   Static Standing Poor +   Dynamic Standing Poor   Ambulatory Poor   Endurance Deficit   Endurance Deficit Yes   Activity Tolerance   Activity Tolerance Patient limited by fatigue   Medical Staff Made Aware EPHRAIM Osborn   Nurse Made Aware KEYONA Armstrong   Assessment   Prognosis Guarded   Problem List Decreased strength;Decreased range of motion;Decreased endurance;Impaired balance;Decreased mobility;Decreased cognition;Impaired judgement;Decreased safety awareness   Assessment Pt received supine in bed, agreeable to therapy session. Pt needed min assist for bed mobility today but demonstrated improvement in sit to stand transfer and ability to pivot OOB to bedside chair, needing mod assist x2. Pt remains limited by fatigue, decreased motor planning, BLE weakness, decreased balance and limited endurance. The patient's AM-PAC Basic Mobility Inpatient Short Form Raw Score is 11. A Raw score of less than or equal to 17 suggests the patient may benefit from discharge to post-acute rehabilitation services. Please also refer to the recommendation of the Physical Therapist for safe discharge planning. Based on current status, Level 2 rehab intensity is recommended at time of discharge. Will continue to follow during hospitalization to maximize independence and functional gains.   Barriers to Discharge None   Goals   Patient Goals to get up   STG Expiration Date 06/07/24   Short Term Goal #1 1. Perform supine<>sit with HOB elevated with use of bedrails. 2 Perform sit<>stand transfers with mod A x 1 3. Stand pivot transfers with mod A x 1   PT Treatment Day 2   Plan   Treatment/Interventions Functional transfer training;LE strengthening/ROM;Therapeutic exercise;Endurance  training;Patient/family training;Bed mobility;Gait training;Compensatory technique education;Spoke to nursing;OT   Progress Progressing toward goals   PT Frequency 2-3x/wk   Discharge Recommendation   Rehab Resource Intensity Level, PT II (Moderate Resource Intensity)   AM-PAC Basic Mobility Inpatient   Turning in Flat Bed Without Bedrails 3   Lying on Back to Sitting on Edge of Flat Bed Without Bedrails 3   Moving Bed to Chair 1   Standing Up From Chair Using Arms 2   Walk in Room 1   Climb 3-5 Stairs With Railing 1   Basic Mobility Inpatient Raw Score 11   Basic Mobility Standardized Score 30.25   [facility] Highest Level Of Mobility   -[facility] Goal 4: Move to chair/commode   -[facility] Achieved 4: Move to chair/commode   Education   Education Provided Mobility training   Patient Reinforcement needed   End of Consult   Patient Position at End of Consult Bedside chair;Bed/Chair alarm activated;All needs within reach   End of Consult Comments needs in reach.     Elijah Chambers, PT

## 2024-05-28 NOTE — ASSESSMENT & PLAN NOTE
History of A-fib -presented with a heart rate in the 140s  Reports that she has not been taking any of her medicine for the last few weeks  RC: Metoprolol tartate 12.5 Mg twice daily  AC: Eliquis 2.5 Mg twice daily  Eliquis resumed evening of 5/27  Intermittent high HR despite stability in hgb and no active bleeding, will increase metoprolol to 25 mg BID

## 2024-05-28 NOTE — PLAN OF CARE
Problem: Potential for Falls  Goal: Patient will remain free of falls  Description: INTERVENTIONS:  - Educate patient/family on patient safety including physical limitations  - Instruct patient to call for assistance with activity   - Consult OT/PT to assist with strengthening/mobility   - Keep Call bell within reach  - Keep bed low and locked with side rails adjusted as appropriate  - Keep care items and personal belongings within reach  - Initiate and maintain comfort rounds  - Make Fall Risk Sign visible to staff  - Offer Toileting every 2 Hours, in advance of need  - Initiate/Maintain bed/chair alarm  - Obtain necessary fall risk management equipment:   - Apply yellow socks and bracelet for high fall risk patients  - Consider moving patient to room near nurses station  Outcome: Progressing     Problem: PAIN - ADULT  Goal: Verbalizes/displays adequate comfort level or baseline comfort level  Description: Interventions:  - Encourage patient to monitor pain and request assistance  - Assess pain using appropriate pain scale  - Administer analgesics based on type and severity of pain and evaluate response  - Implement non-pharmacological measures as appropriate and evaluate response  - Consider cultural and social influences on pain and pain management  - Notify physician/advanced practitioner if interventions unsuccessful or patient reports new pain  Outcome: Progressing     Problem: INFECTION - ADULT  Goal: Absence or prevention of progression during hospitalization  Description: INTERVENTIONS:  - Assess and monitor for signs and symptoms of infection  - Monitor lab/diagnostic results  - Monitor all insertion sites, i.e. indwelling lines, tubes, and drains  - Monitor endotracheal if appropriate and nasal secretions for changes in amount and color  - [place] appropriate cooling/warming therapies per order  - Administer medications as ordered  - Instruct and encourage patient and family to use good hand hygiene  technique  - Identify and instruct in appropriate isolation precautions for identified infection/condition  Outcome: Progressing  Goal: Absence of fever/infection during neutropenic period  Description: INTERVENTIONS:  - Monitor WBC    Outcome: Progressing     Problem: SAFETY ADULT  Goal: Patient will remain free of falls  Description: INTERVENTIONS:  - Educate patient/family on patient safety including physical limitations  - Instruct patient to call for assistance with activity   - Consult OT/PT to assist with strengthening/mobility   - Keep Call bell within reach  - Keep bed low and locked with side rails adjusted as appropriate  - Keep care items and personal belongings within reach  - Initiate and maintain comfort rounds  - Make Fall Risk Sign visible to staff  - Offer Toileting every 2 Hours, in advance of need  - Initiate/Maintain bed/chair alarm  - Obtain necessary fall risk management equipment:   - Apply yellow socks and bracelet for high fall risk patients  - Consider moving patient to room near nurses station  Outcome: Progressing  Goal: Maintain or return to baseline ADL function  Description: INTERVENTIONS:  - Educate patient/family on patient safety including physical limitations  - Instruct patient to call for assistance with activity   - Consult OT/PT to assist with strengthening/mobility   - Keep Call bell within reach  - Keep bed low and locked with side rails adjusted as appropriate  - Keep care items and personal belongings within reach  - Initiate and maintain comfort rounds  - Make Fall Risk Sign visible to staff  - Offer Toileting every 2 Hours, in advance of need  - Initiate/Maintain bed/chair alarm  - Obtain necessary fall risk management equipment:   - Apply yellow socks and bracelet for high fall risk patients  - Consider moving patient to room near nurses station  Outcome: Progressing  Goal: Maintains/Returns to pre admission functional level  Description: INTERVENTIONS:  - Perform  AM-PAC 6 Click Basic Mobility/ Daily Activity assessment daily.  - Set and communicate daily mobility goal to care team and patient/family/caregiver.   - Collaborate with rehabilitation services on mobility goals if consulted  - Perform Range of Motion z times a day.  - Reposition patient every z hours.  - Dangle patient z times a day  - Stand patient zz times a day  - Ambulate patient z times a day  - Out of bed to chair z times a day   - Out of bed for meals z times a day  - Out of bed for toileting  - Record patient progress and toleration of activity level   Outcome: Progressing     Problem: DISCHARGE PLANNING  Goal: Discharge to home or other facility with appropriate resources  Description: INTERVENTIONS:  - Identify barriers to discharge w/patient and caregiver  - Arrange for needed discharge resources and transportation as appropriate  - Identify discharge learning needs (meds, wound care, etc.)  - Arrange for interpretive services to assist at discharge as needed  - Refer to Case Management Department for coordinating discharge planning if the patient needs post-hospital services based on physician/advanced practitioner order or complex needs related to functional status, cognitive ability, or social support system  Outcome: Progressing     Problem: Knowledge Deficit  Goal: Patient/family/caregiver demonstrates understanding of disease process, treatment plan, medications, and discharge instructions  Description: Complete learning assessment and assess knowledge base.  Interventions:  - Provide teaching at level of understanding  - Provide teaching via preferred learning methods  Outcome: Progressing     Problem: CARDIOVASCULAR - ADULT  Goal: Maintains optimal cardiac output and hemodynamic stability  Description: INTERVENTIONS:  - Monitor I/O, vital signs and rhythm  - Monitor for S/S and trends of decreased cardiac output  - Administer and titrate ordered vasoactive medications to optimize hemodynamic  stability  - Assess quality of pulses, skin color and temperature  - Assess for signs of decreased coronary artery perfusion  - Instruct patient to report change in severity of symptoms  Outcome: Progressing  Goal: Absence of cardiac dysrhythmias or at baseline rhythm  Description: INTERVENTIONS:  - Continuous cardiac monitoring, vital signs, obtain 12 lead EKG if ordered  - Administer antiarrhythmic and heart rate control medications as ordered  - Monitor electrolytes and administer replacement therapy as ordered  Outcome: Progressing     Problem: RESPIRATORY - ADULT  Goal: Achieves optimal ventilation and oxygenation  Description: INTERVENTIONS:  - Assess for changes in respiratory status  - Assess for changes in mentation and behavior  - Position to facilitate oxygenation and minimize respiratory effort  - Oxygen administered by appropriate delivery if ordered  - Initiate smoking cessation education as indicated  - Encourage broncho-pulmonary hygiene including cough, deep breathe, Incentive Spirometry  - Assess the need for suctioning and aspirate as needed  - Assess and instruct to report SOB or any respiratory difficulty  - Respiratory Therapy support as indicated  Outcome: Progressing     Problem: SKIN/TISSUE INTEGRITY - ADULT  Goal: Skin Integrity remains intact(Skin Breakdown Prevention)  Description: Assess:  -Perform Sebastien assessment every shift and PRN  -Clean and moisturize skin every shift and PRN  -Inspect skin when repositioning, toileting, and assisting with ADLS  -Assess under medical devices   -Assess extremities for adequate circulation and sensation     Bed Management:  -Have minimal linens on bed & keep smooth, unwrinkled  -Change linens as needed when moist or perspiring  -Avoid sitting or lying in one position for more than 2 hours while in bed  -Keep HOB at 30 degrees     Toileting:  -Offer bedside commode  -Assess for incontinence every shift and PRN  -Use incontinent care products after  each incontinent episode     Activity:  -Mobilize patient 3 times a day  -Encourage activity and walks on unit  -Encourage or provide ROM exercises   -Turn and reposition patient every 2 Hours  -Use appropriate equipment to lift or move patient in bed  -Instruct/ Assist with weight shifting every 2 hours when out of bed in chair  -Consider limitation of chair time 2 hour intervals    Skin Care:  -Avoid use of baby powder, tape, friction and shearing, hot water or constrictive clothing  -Relieve pressure over bony prominences using waffle cushion, pillows  -Do not massage red bony areas    Next Steps:  -Teach patient strategies to minimize risks    -Consider consults to  interdisciplinary teams   Outcome: Progressing  Goal: Incision(s), wounds(s) or drain site(s) healing without S/S of infection  Description: INTERVENTIONS  - Assess and document dressing, incision, wound bed, drain sites and surrounding tissue  - Provide patient and family education  - Perform skin care/dressing changes every shift and PRN  Outcome: Progressing  Goal: Pressure injury heals and does not worsen  Description: Interventions:  - Implement low air loss mattress or specialty surface (Criteria met)  - Apply silicone foam dressing  - Instruct/assist with weight shifting every 120 minutes when in chair   - Limit chair time to 2 hour intervals  - Use special pressure reducing interventions such as waffle cushion when in chair   - Apply fecal or urinary incontinence containment device   - Perform passive or active ROM every shift and PRN  - Turn and reposition patient & offload bony prominences every 2 hours   - Utilize friction reducing device or surface for transfers   - Consider consults to  interdisciplinary teams   - Use incontinent care products after each incontinent episode  - Consider nutrition services referral as needed  Outcome: Progressing     Problem: HEMATOLOGIC - ADULT  Goal: Maintains hematologic stability  Description:  INTERVENTIONS  - Assess for signs and symptoms of bleeding or hemorrhage  - Monitor labs  - Administer supportive blood products/factors as ordered and appropriate  Outcome: Progressing     Problem: MUSCULOSKELETAL - ADULT  Goal: Maintain or return mobility to safest level of function  Description: INTERVENTIONS:  - Assess patient's ability to carry out ADLs; assess patient's baseline for ADL function and identify physical deficits which impact ability to perform ADLs (bathing, care of mouth/teeth, toileting, grooming, dressing, etc.)  - Assess/evaluate cause of self-care deficits   - Assess range of motion  - Assess patient's mobility  - Assess patient's need for assistive devices and provide as appropriate  - Encourage maximum independence but intervene and supervise when necessary  - Involve family in performance of ADLs  - Assess for home care needs following discharge   - Consider OT consult to assist with ADL evaluation and planning for discharge  - Provide patient education as appropriate  Outcome: Progressing  Goal: Maintain proper alignment of affected body part  Description: INTERVENTIONS:  - Support, maintain and protect limb and body alignment  - Provide patient/ family with appropriate education  Outcome: Progressing     Problem: Prexisting or High Potential for Compromised Skin Integrity  Goal: Skin integrity is maintained or improved  Description: INTERVENTIONS:  - Identify patients at risk for skin breakdown  - Assess and monitor skin integrity  - Assess and monitor nutrition and hydration status  - Monitor labs   - Assess for incontinence   - Turn and reposition patient  - Assist with mobility/ambulation  - Relieve pressure over bony prominences  - Avoid friction and shearing  - Provide appropriate hygiene as needed including keeping skin clean and dry  - Evaluate need for skin moisturizer/barrier cream  - Collaborate with interdisciplinary team   - Patient/family teaching  - Consider wound care  consult   Outcome: Progressing     Problem: Nutrition/Hydration-ADULT  Goal: Nutrient/Hydration intake appropriate for improving, restoring or maintaining nutritional needs  Description: Monitor and assess patient's nutrition/hydration status for malnutrition. Collaborate with interdisciplinary team and initiate plan and interventions as ordered.  Monitor patient's weight and dietary intake as ordered or per policy. Utilize nutrition screening tool and intervene as necessary. Determine patient's food preferences and provide high-protein, high-caloric foods as appropriate.     INTERVENTIONS:  - Monitor oral intake, urinary output, labs, and treatment plans  - Assess nutrition and hydration status and recommend course of action  - Evaluate amount of meals eaten  - Assist patient with eating if necessary   - Allow adequate time for meals  - Recommend/ encourage appropriate diets, oral nutritional supplements, and vitamin/mineral supplements  - Order, calculate, and assess calorie counts as needed  - Recommend, monitor, and adjust tube feedings and TPN/PPN based on assessed needs  - Assess need for intravenous fluids  - Provide specific nutrition/hydration education as appropriate  - Include patient/family/caregiver in decisions related to nutrition  Outcome: Progressing

## 2024-05-29 VITALS
OXYGEN SATURATION: 97 % | BODY MASS INDEX: 19.33 KG/M2 | DIASTOLIC BLOOD PRESSURE: 95 MMHG | WEIGHT: 116 LBS | HEIGHT: 65 IN | RESPIRATION RATE: 16 BRPM | SYSTOLIC BLOOD PRESSURE: 155 MMHG | HEART RATE: 89 BPM | TEMPERATURE: 98.1 F

## 2024-05-29 PROBLEM — I48.91 ATRIAL FIBRILLATION WITH RVR (HCC): Status: RESOLVED | Noted: 2023-12-16 | Resolved: 2024-05-29

## 2024-05-29 PROBLEM — R94.31 ABNORMAL EKG: Status: RESOLVED | Noted: 2024-05-23 | Resolved: 2024-05-29

## 2024-05-29 PROBLEM — R93.2 ABNORMAL CT SCAN, GALLBLADDER: Status: RESOLVED | Noted: 2024-05-23 | Resolved: 2024-05-29

## 2024-05-29 LAB
ALBUMIN SERPL BCP-MCNC: 3.1 G/DL (ref 3.5–5)
ALP SERPL-CCNC: 34 U/L (ref 34–104)
ALT SERPL W P-5'-P-CCNC: 4 U/L (ref 7–52)
ANION GAP SERPL CALCULATED.3IONS-SCNC: 4 MMOL/L (ref 4–13)
AST SERPL W P-5'-P-CCNC: 16 U/L (ref 13–39)
BASOPHILS # BLD AUTO: 0.06 THOUSANDS/ÂΜL (ref 0–0.1)
BASOPHILS NFR BLD AUTO: 1 % (ref 0–1)
BILIRUB SERPL-MCNC: 0.59 MG/DL (ref 0.2–1)
BUN SERPL-MCNC: 10 MG/DL (ref 5–25)
CALCIUM ALBUM COR SERPL-MCNC: 9.4 MG/DL (ref 8.3–10.1)
CALCIUM SERPL-MCNC: 8.7 MG/DL (ref 8.4–10.2)
CHLORIDE SERPL-SCNC: 108 MMOL/L (ref 96–108)
CO2 SERPL-SCNC: 24 MMOL/L (ref 21–32)
CREAT SERPL-MCNC: 0.64 MG/DL (ref 0.6–1.3)
EOSINOPHIL # BLD AUTO: 0.3 THOUSAND/ÂΜL (ref 0–0.61)
EOSINOPHIL NFR BLD AUTO: 7 % (ref 0–6)
ERYTHROCYTE [DISTWIDTH] IN BLOOD BY AUTOMATED COUNT: 26.7 % (ref 11.6–15.1)
GFR SERPL CREATININE-BSD FRML MDRD: 80 ML/MIN/1.73SQ M
GLUCOSE SERPL-MCNC: 87 MG/DL (ref 65–140)
HCT VFR BLD AUTO: 30.6 % (ref 34.8–46.1)
HGB BLD-MCNC: 8.7 G/DL (ref 11.5–15.4)
IMM GRANULOCYTES # BLD AUTO: 0.03 THOUSAND/UL (ref 0–0.2)
IMM GRANULOCYTES NFR BLD AUTO: 1 % (ref 0–2)
LYMPHOCYTES # BLD AUTO: 1.01 THOUSANDS/ÂΜL (ref 0.6–4.47)
LYMPHOCYTES NFR BLD AUTO: 22 % (ref 14–44)
MCH RBC QN AUTO: 23.8 PG (ref 26.8–34.3)
MCHC RBC AUTO-ENTMCNC: 28.4 G/DL (ref 31.4–37.4)
MCV RBC AUTO: 84 FL (ref 82–98)
MONOCYTES # BLD AUTO: 0.5 THOUSAND/ÂΜL (ref 0.17–1.22)
MONOCYTES NFR BLD AUTO: 11 % (ref 4–12)
NEUTROPHILS # BLD AUTO: 2.63 THOUSANDS/ÂΜL (ref 1.85–7.62)
NEUTS SEG NFR BLD AUTO: 58 % (ref 43–75)
NRBC BLD AUTO-RTO: 0 /100 WBCS
PLATELET # BLD AUTO: 179 THOUSANDS/UL (ref 149–390)
PMV BLD AUTO: 10 FL (ref 8.9–12.7)
POTASSIUM SERPL-SCNC: 4.4 MMOL/L (ref 3.5–5.3)
PROT SERPL-MCNC: 5 G/DL (ref 6.4–8.4)
RBC # BLD AUTO: 3.66 MILLION/UL (ref 3.81–5.12)
SODIUM SERPL-SCNC: 136 MMOL/L (ref 135–147)
WBC # BLD AUTO: 4.53 THOUSAND/UL (ref 4.31–10.16)

## 2024-05-29 PROCEDURE — RECHECK: Performed by: PHYSICIAN ASSISTANT

## 2024-05-29 PROCEDURE — 99239 HOSP IP/OBS DSCHRG MGMT >30: CPT | Performed by: PHYSICIAN ASSISTANT

## 2024-05-29 PROCEDURE — 85025 COMPLETE CBC W/AUTO DIFF WBC: CPT | Performed by: INTERNAL MEDICINE

## 2024-05-29 PROCEDURE — 80053 COMPREHEN METABOLIC PANEL: CPT | Performed by: INTERNAL MEDICINE

## 2024-05-29 RX ORDER — METOPROLOL TARTRATE 50 MG/1
50 TABLET, FILM COATED ORAL EVERY 12 HOURS SCHEDULED
Status: DISCONTINUED | OUTPATIENT
Start: 2024-05-29 | End: 2024-05-29 | Stop reason: HOSPADM

## 2024-05-29 RX ORDER — METOPROLOL TARTRATE 50 MG/1
25 TABLET, FILM COATED ORAL EVERY 12 HOURS SCHEDULED
Start: 2024-05-29

## 2024-05-29 RX ORDER — METOPROLOL TARTRATE 50 MG/1
50 TABLET, FILM COATED ORAL EVERY 12 HOURS SCHEDULED
Start: 2024-05-29 | End: 2024-05-29

## 2024-05-29 RX ORDER — PANTOPRAZOLE SODIUM 40 MG/1
40 TABLET, DELAYED RELEASE ORAL
Start: 2024-05-30

## 2024-05-29 RX ORDER — FERROUS SULFATE 325(65) MG
325 TABLET ORAL
Start: 2024-05-30

## 2024-05-29 RX ADMIN — METOPROLOL TARTRATE 2.5 MG: 5 INJECTION INTRAVENOUS at 08:32

## 2024-05-29 RX ADMIN — PANTOPRAZOLE SODIUM 40 MG: 40 TABLET, DELAYED RELEASE ORAL at 04:03

## 2024-05-29 RX ADMIN — ACETAMINOPHEN 650 MG: 325 TABLET, FILM COATED ORAL at 04:03

## 2024-05-29 RX ADMIN — METOPROLOL TARTRATE 25 MG: 25 TABLET, FILM COATED ORAL at 12:49

## 2024-05-29 RX ADMIN — FERROUS SULFATE TAB 325 MG (65 MG ELEMENTAL FE) 325 MG: 325 (65 FE) TAB at 08:38

## 2024-05-29 RX ADMIN — APIXABAN 2.5 MG: 2.5 TABLET, FILM COATED ORAL at 08:38

## 2024-05-29 NOTE — PROGRESS NOTES
"[facility]  Progress Note  Name: Amanda Gonzalez I  MRN: [id number]  Unit/Bed#: -01 I Date of Admission: 5/23/2024   Date of Service: 5/29/2024 I Hospital Day: 6    Assessment & Plan   * Anemia  Assessment & Plan  Recent Labs     05/27/24  1430 05/28/24  0257 05/29/24  0529   HGB 8.6* 8.0* 8.7*        Hgb 3.3 - presented in Afib with RVR but BP stable - suspect slow GI bleed   Patient asymptomatic  On review of SNF paperwork-patient taking Eliquis 2.5 Mg BID and ASA 325mg q8h - held on admit  S/P 3 unit PRBC since admission   Iron panel showed severe iron deficiency anemia  Protonix 40 Mg IV twice daily started-continue  Completed Venofer x 3 days, now on oral iron   EGD/colonoscopy completed 5/26: Mild to moderate gastritis.  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  Hgb remains stable - eliquis resumed evening of 5/27 without recurrence of bleeding  Stable for discharge to rehab pending insurance auth      Abnormal CT scan, gallbladder  Assessment & Plan  CT A/P: \"Distended gallbladder with gallstone and possible wall thickening.\"  Patient denies any abdominal pain, no RUQ tenderness on exam  No transaminitis , bilirubin or elevated alk phos  Hold on RUQ US at this time    Fall  Assessment & Plan  Reports a fall either 5 to 8 weeks ago with head strike from wheelchair, states that she was never evaluated since the fall  CT C/A/P without any evidence of trauma  Fall precautions  PT OT consult - recommending rehab    Atrial fibrillation with RVR (HCC)  Assessment & Plan  History of A-fib -presented with a heart rate in the 140s  Reports that she has not been taking any of her medicine for the last few weeks  RC: Metoprolol tartate 12.5 Mg twice daily  AC: Eliquis 2.5 Mg twice daily  Eliquis resumed evening of 5/27  Intermittent high HR despite stability in hgb and no active bleeding, increased metoprolol to 25 mg BID previously with " improvement    Hypertension  Assessment & Plan  Home regimen: Metoprolol tartate 12.5mg BID and lisinopril 10 Mg daily  However patient noncompliant with medications  Currently on metoprolol only. Continue metoprolol and monitor - increased  due to intermittent afib RVR  Blood pressure stable         VTE Pharmacologic Prophylaxis: VTE Score: 3 Moderate Risk (Score 3-4) - Pharmacological DVT Prophylaxis Ordered: apixaban (Eliquis).    Mobility:   Basic Mobility Inpatient Raw Score: 11  -HLM Goal: 4: Move to chair/commode  JH-HLM Achieved: 2: Bed activities/Dependent transfer  JH-HLM Goal NOT achieved. Continue with multidisciplinary rounding and encourage appropriate mobility to improve upon JH-HLM goals.    Patient Centered Rounds: I performed bedside rounds with nursing staff today.   Discussions with Specialists or Other Care Team Provider: SHAMAR    Education and Discussions with Family / Patient: Updated  (daughter) via phone.    Total Time Spent on Date of Encounter in care of patient: 40 mins. This time was spent on one or more of the following: performing physical exam; counseling and coordination of care; obtaining or reviewing history; documenting in the medical record; reviewing/ordering tests, medications or procedures; communicating with other healthcare professionals and discussing with patient's family/caregivers.    Current Length of Stay: 6 day(s)  Current Patient Status: Inpatient   Certification Statement: The patient will continue to require additional inpatient hospital stay due to rehab placement  Discharge Plan: Anticipate discharge later today or tomorrow to rehab facility.    Code Status: Level 3 - DNAR and DNI    Subjective:   Patient feels well, denies any complaints.  Tolerating diet.  No evidence of bleeding per nursing.  No events overnight.    Objective:     Vitals:   Temp (24hrs), Av.4 °F (36.3 °C), Min:96.2 °F (35.7 °C), Max:98.2 °F (36.8 °C)    Temp:  [96.2 °F  (35.7 °C)-98.2 °F (36.8 °C)] 96.2 °F (35.7 °C)  HR:  [89-98] 89  Resp:  [16-18] 16  BP: (118-155)/(83-95) 155/95  SpO2:  [96 %-97 %] 97 %  Body mass index is 19.3 kg/m².     Input and Output Summary (last 24 hours):     Intake/Output Summary (Last 24 hours) at 5/29/2024 1022  Last data filed at 5/29/2024 0909  Gross per 24 hour   Intake 1120 ml   Output 458 ml   Net 662 ml       Physical Exam:   Physical Exam  Vitals and nursing note reviewed.   Constitutional:       Appearance: Normal appearance.      Comments: No acute distress   HENT:      Head: Normocephalic.   Eyes:      General: No scleral icterus.     Extraocular Movements: Extraocular movements intact.      Conjunctiva/sclera: Conjunctivae normal.   Cardiovascular:      Rate and Rhythm: Normal rate. Rhythm irregularly irregular.   Pulmonary:      Effort: Pulmonary effort is normal.      Breath sounds: Normal breath sounds. No wheezing, rhonchi or rales.   Abdominal:      General: Bowel sounds are normal.      Palpations: Abdomen is soft.      Tenderness: There is no abdominal tenderness. There is no guarding or rebound.   Musculoskeletal:         General: No swelling, tenderness or deformity.      Cervical back: Normal range of motion.      Comments: Able to move upper/lower ext bilaterally, no edema   Skin:     General: Skin is warm and dry.   Neurological:      Mental Status: She is alert. Mental status is at baseline.   Psychiatric:         Mood and Affect: Mood normal.         Speech: Speech normal.         Behavior: Behavior normal.          Additional Data:     Labs:  Results from last 7 days   Lab Units 05/29/24  0529   WBC Thousand/uL 4.53   HEMOGLOBIN g/dL 8.7*   HEMATOCRIT % 30.6*   PLATELETS Thousands/uL 179   SEGS PCT % 58   LYMPHO PCT % 22   MONO PCT % 11   EOS PCT % 7*     Results from last 7 days   Lab Units 05/29/24  0529   SODIUM mmol/L 136   POTASSIUM mmol/L 4.4   CHLORIDE mmol/L 108   CO2 mmol/L 24   BUN mg/dL 10   CREATININE mg/dL 0.64    ANION GAP mmol/L 4   CALCIUM mg/dL 8.7   ALBUMIN g/dL 3.1*   TOTAL BILIRUBIN mg/dL 0.59   ALK PHOS U/L 34   ALT U/L 4*   AST U/L 16   GLUCOSE RANDOM mg/dL 87     Results from last 7 days   Lab Units 05/23/24  1657   INR  1.36*             Results from last 7 days   Lab Units 05/23/24  1657   LACTIC ACID mmol/L 1.5   PROCALCITONIN ng/ml 0.12       Lines/Drains:  Invasive Devices       Peripheral Intravenous Line  Duration             Peripheral IV 05/28/24 Dorsal (posterior);Right Hand <1 day                      Telemetry:  Telemetry Orders (From admission, onward)               24 Hour Telemetry Monitoring  Continuous x 24 Hours (Telem)        Question:  Reason for 24 Hour Telemetry  Answer:  Arrhythmias requiring acute medical intervention / PPM or ICD malfunction                     Telemetry Reviewed: Atrial fibrillation. HR averaging 80  Indication for Continued Telemetry Use: Arrthymias requiring medical therapy             Imaging: No pertinent imaging reviewed.    Recent Cultures (last 7 days):   Results from last 7 days   Lab Units 05/23/24  1905 05/23/24  1657   BLOOD CULTURE   --  No Growth After 5 Days.  No Growth After 5 Days.   URINE CULTURE  >100,000 cfu/ml Aerococcus species*  <10,000 cfu/ml  --        Last 24 Hours Medication List:   Current Facility-Administered Medications   Medication Dose Route Frequency Provider Last Rate    acetaminophen  650 mg Oral Q6H PRN Camille Montes MD      apixaban  2.5 mg Oral BID Sabrina Muhammad PA-C      ferrous sulfate  325 mg Oral Daily With Breakfast Camille Montes MD      metoprolol  2.5 mg Intravenous Q6H PRN Camille Montes MD      metoprolol tartrate  25 mg Oral Q12H [facility] Sabrina Muhammad PA-C      ondansetron  4 mg Intravenous Q6H PRN Camille Montes MD      pantoprazole  40 mg Oral Early Morning Asael Galvez DO          Today, Patient Was Seen By: Sabrina Muhammad PA-C    **Please Note: This note may have been constructed  using a voice recognition system.**

## 2024-05-29 NOTE — ASSESSMENT & PLAN NOTE
History of A-fib -presented with a heart rate in the 140s  Reports that she has not been taking any of her medicine for the last few weeks  RC: Metoprolol tartate 12.5 Mg twice daily  AC: Eliquis 2.5 Mg twice daily  Eliquis resumed evening of 5/27  Intermittent high HR despite stability in hgb and no active bleeding, increased metoprolol to 25 mg BID.  HR immediately improves 80-90s following oral dose of metoprolol.  Would caution aggressive titration as per chart review patient previously has had baseline sinus bradycardia

## 2024-05-29 NOTE — ASSESSMENT & PLAN NOTE
Recent Labs     05/27/24  1430 05/28/24  0257 05/29/24  0529   HGB 8.6* 8.0* 8.7*        Hgb 3.3 - presented in Afib with RVR but BP stable - suspect slow GI bleed   Patient asymptomatic  On review of SNF paperwork-patient taking Eliquis 2.5 Mg BID and ASA 325mg q8h - held on admit  S/P 3 unit PRBC since admission   Iron panel showed severe iron deficiency anemia  Continue protonix  Completed Venofer x 3 days, now on oral iron   EGD/colonoscopy completed 5/26: Mild to moderate gastritis.  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  Hgb remains stable - eliquis resumed evening of 5/27 without recurrence of bleeding  Stable for discharge to rehab

## 2024-05-29 NOTE — ASSESSMENT & PLAN NOTE
Recent Labs     05/27/24  1430 05/28/24  0257 05/29/24  0529   HGB 8.6* 8.0* 8.7*        Hgb 3.3 - presented in Afib with RVR but BP stable - suspect slow GI bleed   Patient asymptomatic  On review of SNF paperwork-patient taking Eliquis 2.5 Mg BID and ASA 325mg q8h - held on admit  S/P 3 unit PRBC since admission   Iron panel showed severe iron deficiency anemia  Protonix 40 Mg IV twice daily started-continue  Completed Venofer x 3 days, now on oral iron   EGD/colonoscopy completed 5/26: Mild to moderate gastritis.  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  Hgb remains stable - eliquis resumed evening of 5/27 without recurrence of bleeding  Stable for discharge to rehab pending insurance auth

## 2024-05-29 NOTE — ASSESSMENT & PLAN NOTE
History of A-fib -presented with a heart rate in the 140s  Reports that she has not been taking any of her medicine for the last few weeks  RC: Metoprolol tartate 12.5 Mg twice daily  AC: Eliquis 2.5 Mg twice daily  Eliquis resumed evening of 5/27  Intermittent high HR despite stability in hgb and no active bleeding, increased metoprolol to 25 mg BID previously with improvement

## 2024-05-29 NOTE — PLAN OF CARE
Problem: Potential for Falls  Goal: Patient will remain free of falls  Description: INTERVENTIONS:  - Educate patient/family on patient safety including physical limitations  - Instruct patient to call for assistance with activity   - Consult OT/PT to assist with strengthening/mobility   - Keep Call bell within reach  - Keep bed low and locked with side rails adjusted as appropriate  - Keep care items and personal belongings within reach  - Initiate and maintain comfort rounds  - Make Fall Risk Sign visible to staff  - Offer Toileting every 2 Hours, in advance of need  - Initiate/Maintain bed/chair alarm  - Obtain necessary fall risk management equipment:   - Apply yellow socks and bracelet for high fall risk patients  - Consider moving patient to room near nurses station  Outcome: Progressing     Problem: PAIN - ADULT  Goal: Verbalizes/displays adequate comfort level or baseline comfort level  Description: Interventions:  - Encourage patient to monitor pain and request assistance  - Assess pain using appropriate pain scale  - Administer analgesics based on type and severity of pain and evaluate response  - Implement non-pharmacological measures as appropriate and evaluate response  - Consider cultural and social influences on pain and pain management  - Notify physician/advanced practitioner if interventions unsuccessful or patient reports new pain  Outcome: Progressing     Problem: INFECTION - ADULT  Goal: Absence or prevention of progression during hospitalization  Description: INTERVENTIONS:  - Assess and monitor for signs and symptoms of infection  - Monitor lab/diagnostic results  - Monitor all insertion sites, i.e. indwelling lines, tubes, and drains  - Monitor endotracheal if appropriate and nasal secretions for changes in amount and color  - [place] appropriate cooling/warming therapies per order  - Administer medications as ordered  - Instruct and encourage patient and family to use good hand hygiene  technique  - Identify and instruct in appropriate isolation precautions for identified infection/condition  Outcome: Progressing  Goal: Absence of fever/infection during neutropenic period  Description: INTERVENTIONS:  - Monitor WBC    Outcome: Progressing     Problem: SAFETY ADULT  Goal: Patient will remain free of falls  Description: INTERVENTIONS:  - Educate patient/family on patient safety including physical limitations  - Instruct patient to call for assistance with activity   - Consult OT/PT to assist with strengthening/mobility   - Keep Call bell within reach  - Keep bed low and locked with side rails adjusted as appropriate  - Keep care items and personal belongings within reach  - Initiate and maintain comfort rounds  - Make Fall Risk Sign visible to staff  - Offer Toileting every 2 Hours, in advance of need  - Initiate/Maintain bed/chair alarm  - Obtain necessary fall risk management equipment:   - Apply yellow socks and bracelet for high fall risk patients  - Consider moving patient to room near nurses station  Outcome: Progressing  Goal: Maintain or return to baseline ADL function  Description: INTERVENTIONS:  - Educate patient/family on patient safety including physical limitations  - Instruct patient to call for assistance with activity   - Consult OT/PT to assist with strengthening/mobility   - Keep Call bell within reach  - Keep bed low and locked with side rails adjusted as appropriate  - Keep care items and personal belongings within reach  - Initiate and maintain comfort rounds  - Make Fall Risk Sign visible to staff  - Offer Toileting every 2 Hours, in advance of need  - Initiate/Maintain bed/chair alarm  - Obtain necessary fall risk management equipment:   - Apply yellow socks and bracelet for high fall risk patients  - Consider moving patient to room near nurses station  Outcome: Progressing  Goal: Maintains/Returns to pre admission functional level  Description: INTERVENTIONS:  - Perform  AM-PAC 6 Click Basic Mobility/ Daily Activity assessment daily.  - Set and communicate daily mobility goal to care team and patient/family/caregiver.   - Collaborate with rehabilitation services on mobility goals if consulted  - Perform Range of Motion 2 times a day.  - Reposition patient every 2 hours.  - Dangle patient3 times a day  - Stand patient 3 times a day  - Ambulate patient 3 times a day  - Out of bed to chair 3 times a day   - Out of bed for meals 3 times a day  - Out of bed for toileting  - Record patient progress and toleration of activity level   Outcome: Progressing     Problem: DISCHARGE PLANNING  Goal: Discharge to home or other facility with appropriate resources  Description: INTERVENTIONS:  - Identify barriers to discharge w/patient and caregiver  - Arrange for needed discharge resources and transportation as appropriate  - Identify discharge learning needs (meds, wound care, etc.)  - Arrange for interpretive services to assist at discharge as needed  - Refer to Case Management Department for coordinating discharge planning if the patient needs post-hospital services based on physician/advanced practitioner order or complex needs related to functional status, cognitive ability, or social support system  Outcome: Progressing     Problem: Knowledge Deficit  Goal: Patient/family/caregiver demonstrates understanding of disease process, treatment plan, medications, and discharge instructions  Description: Complete learning assessment and assess knowledge base.  Interventions:  - Provide teaching at level of understanding  - Provide teaching via preferred learning methods  Outcome: Progressing     Problem: CARDIOVASCULAR - ADULT  Goal: Maintains optimal cardiac output and hemodynamic stability  Description: INTERVENTIONS:  - Monitor I/O, vital signs and rhythm  - Monitor for S/S and trends of decreased cardiac output  - Administer and titrate ordered vasoactive medications to optimize hemodynamic  stability  - Assess quality of pulses, skin color and temperature  - Assess for signs of decreased coronary artery perfusion  - Instruct patient to report change in severity of symptoms  Outcome: Progressing  Goal: Absence of cardiac dysrhythmias or at baseline rhythm  Description: INTERVENTIONS:  - Continuous cardiac monitoring, vital signs, obtain 12 lead EKG if ordered  - Administer antiarrhythmic and heart rate control medications as ordered  - Monitor electrolytes and administer replacement therapy as ordered  Outcome: Progressing     Problem: RESPIRATORY - ADULT  Goal: Achieves optimal ventilation and oxygenation  Description: INTERVENTIONS:  - Assess for changes in respiratory status  - Assess for changes in mentation and behavior  - Position to facilitate oxygenation and minimize respiratory effort  - Oxygen administered by appropriate delivery if ordered  - Initiate smoking cessation education as indicated  - Encourage broncho-pulmonary hygiene including cough, deep breathe, Incentive Spirometry  - Assess the need for suctioning and aspirate as needed  - Assess and instruct to report SOB or any respiratory difficulty  - Respiratory Therapy support as indicated  Outcome: Progressing     Problem: HEMATOLOGIC - ADULT  Goal: Maintains hematologic stability  Description: INTERVENTIONS  - Assess for signs and symptoms of bleeding or hemorrhage  - Monitor labs  - Administer supportive blood products/factors as ordered and appropriate  Outcome: Progressing     Problem: MUSCULOSKELETAL - ADULT  Goal: Maintain or return mobility to safest level of function  Description: INTERVENTIONS:  - Assess patient's ability to carry out ADLs; assess patient's baseline for ADL function and identify physical deficits which impact ability to perform ADLs (bathing, care of mouth/teeth, toileting, grooming, dressing, etc.)  - Assess/evaluate cause of self-care deficits   - Assess range of motion  - Assess patient's mobility  - Assess  patient's need for assistive devices and provide as appropriate  - Encourage maximum independence but intervene and supervise when necessary  - Involve family in performance of ADLs  - Assess for home care needs following discharge   - Consider OT consult to assist with ADL evaluation and planning for discharge  - Provide patient education as appropriate  Outcome: Progressing  Goal: Maintain proper alignment of affected body part  Description: INTERVENTIONS:  - Support, maintain and protect limb and body alignment  - Provide patient/ family with appropriate education  Outcome: Progressing     Problem: Prexisting or High Potential for Compromised Skin Integrity  Goal: Skin integrity is maintained or improved  Description: INTERVENTIONS:  - Identify patients at risk for skin breakdown  - Assess and monitor skin integrity  - Assess and monitor nutrition and hydration status  - Monitor labs   - Assess for incontinence   - Turn and reposition patient  - Assist with mobility/ambulation  - Relieve pressure over bony prominences  - Avoid friction and shearing  - Provide appropriate hygiene as needed including keeping skin clean and dry  - Evaluate need for skin moisturizer/barrier cream  - Collaborate with interdisciplinary team   - Patient/family teaching  - Consider wound care consult   Outcome: Progressing     Problem: Nutrition/Hydration-ADULT  Goal: Nutrient/Hydration intake appropriate for improving, restoring or maintaining nutritional needs  Description: Monitor and assess patient's nutrition/hydration status for malnutrition. Collaborate with interdisciplinary team and initiate plan and interventions as ordered.  Monitor patient's weight and dietary intake as ordered or per policy. Utilize nutrition screening tool and intervene as necessary. Determine patient's food preferences and provide high-protein, high-caloric foods as appropriate.     INTERVENTIONS:  - Monitor oral intake, urinary output, labs, and treatment  plans  - Assess nutrition and hydration status and recommend course of action  - Evaluate amount of meals eaten  - Assist patient with eating if necessary   - Allow adequate time for meals  - Recommend/ encourage appropriate diets, oral nutritional supplements, and vitamin/mineral supplements  - Order, calculate, and assess calorie counts as needed  - Recommend, monitor, and adjust tube feedings and TPN/PPN based on assessed needs  - Assess need for intravenous fluids  - Provide specific nutrition/hydration education as appropriate  - Include patient/family/caregiver in decisions related to nutrition  Outcome: Progressing

## 2024-05-29 NOTE — CASE MANAGEMENT
Case Management Discharge Planning Note    Patient name Amanda Gonzalez  Location /-01 MRN [id number]  : 1935 Date 2024       Current Admission Date: 2024  Current Admission Diagnosis:Anemia   Patient Active Problem List    Diagnosis Date Noted Date Diagnosed    Anemia 2024     Other problems related to medical facilities and other health care 2024     Fall 2024     Abnormal EKG 2024     Abnormal CT scan, gallbladder 2024     Paroxysmal atrial fibrillation (HCC) 2024     Atrial fibrillation with RVR (HCC) 2023     Hypertensive urgency 12/15/2023     Ambulatory dysfunction 12/15/2023     Primary osteoarthritis of left hip 2021     Lumbar spondylosis 07/10/2019     Hip pain, chronic, left 07/10/2019     Chronic pain syndrome 07/10/2019     Back pain 2017     Bilateral low back pain without sciatica 2017     Greater trochanteric bursitis of both hips 2017     Primary osteoarthritis of both knees 2016     Allergic rhinitis 2015     Colon polyps 2014     GERD (gastroesophageal reflux disease) 2014     Generalized osteoarthritis of multiple sites 2013     Hyperlipidemia 2013     Hypertension 2013       LOS (days): 6  Geometric Mean LOS (GMLOS) (days): 3  Days to GMLOS:-2.7     OBJECTIVE:  Risk of Unplanned Readmission Score: 12.65         Current admission status: Inpatient   Preferred Pharmacy:   [facility] [address] [address]  Phone: [phone] Fax: [phone]    [facility] [place], [place] - [address] [facility]  [address] [facility] [address]  Phone: [phone] Fax: [phone]    Primary Care Provider: Leila Mcknight DO    Primary Insurance: [facility] REP  Secondary Insurance: [facility]    DISCHARGE DETAILS:    IMM Given (Date):: 24  IMM Given  to:: Family   IMM reviewed with patient's caregiver, patient's caregiver agrees with discharge determination.   Additional Comments: [facility] can accept Pt back with auth pending. BLS transport request sent to [facility]. [facility] to transport Pt to [facility]. Pickup is 3:30pm. Pt's nurse(Rosmery) and Pt's dtr (Rachel) informed of transport time. Pending auth number and transport time given to [facility] via [facility].

## 2024-05-29 NOTE — DISCHARGE SUMMARY
[facility]  Discharge- Amanda Gonzalez 1935, 88 y.o. female MRN: [id number]  Unit/Bed#: -01 Encounter: [id number]  Primary Care Provider: Leila Mcknight DO   Date and time admitted to hospital: 5/23/2024  4:44 PM    * Anemia  Assessment & Plan  Recent Labs     05/27/24  1430 05/28/24  0257 05/29/24  0529   HGB 8.6* 8.0* 8.7*        Hgb 3.3 - presented in Afib with RVR but BP stable - suspect slow GI bleed   Patient asymptomatic  On review of SNF paperwork-patient taking Eliquis 2.5 Mg BID and ASA 325mg q8h - held on admit  S/P 3 unit PRBC since admission   Iron panel showed severe iron deficiency anemia  Continue protonix  Completed Venofer x 3 days, now on oral iron   EGD/colonoscopy completed 5/26: Mild to moderate gastritis.  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  Hgb remains stable - eliquis resumed evening of 5/27 without recurrence of bleeding  Stable for discharge to rehab       Fall  Assessment & Plan  Reports a fall either 5 to 8 weeks ago with head strike from wheelchair, states that she was never evaluated since the fall  CT C/A/P without any evidence of trauma  Fall precautions  PT OT consult - recommending rehab    Hypertension  Assessment & Plan  Home regimen: Metoprolol tartate 12.5mg BID and lisinopril 10 Mg daily  However patient noncompliant with medications  Currently on metoprolol only. Continue metoprolol and monitor - increased 5/28 due to intermittent afib RVR  Blood pressure stable    Atrial fibrillation with RVR (HCC)-resolved as of 5/29/2024  Assessment & Plan  History of A-fib -presented with a heart rate in the 140s  Reports that she has not been taking any of her medicine for the last few weeks  RC: Metoprolol tartate 12.5 Mg twice daily  AC: Eliquis 2.5 Mg twice daily  Eliquis resumed evening of 5/27  Intermittent high HR despite stability in hgb and no active bleeding, increased metoprolol to 25 mg BID.  HR immediately  improves 80-90s following oral dose of metoprolol.  Would caution aggressive titration as per chart review patient previously has had baseline sinus bradycardia      Medical Problems       Resolved Problems  Date Reviewed: 5/29/2024            Resolved    Atrial fibrillation with RVR (HCC) 5/29/2024     Resolved by  Sabrina Muhammad PA-C    Cystitis 5/25/2024     Resolved by  Camille Montes MD    Abnormal EKG 5/29/2024     Resolved by  Sabrina Muhammad PA-C    Abnormal CT scan, gallbladder 5/29/2024     Resolved by  Sabrina Muhammad PA-C    Hyponatremia 5/25/2024     Resolved by  Camille Montes MD        Discharging Physician / Practitioner: Sabrina Muhammad PA-C  PCP: Leila Mcknight DO  Admission Date:   Admission Orders (From admission, onward)       Ordered        05/23/24 1951  INPATIENT ADMISSION  Once                          Discharge Date: 05/29/24    Consultations During Hospital Stay:  Gastroenterology    Procedures Performed:   EGD: The duodenum appeared normal.  Mild edematous, erythematous mucosa, consistent with gastritis in the body of the stomach; performed cold forceps biopsy.  The esophagus appeared normal.  Colonoscopy: Few scattered diverticula of moderate severity in the sigmoid colon.  Single large angioectasia in the cecum; there was stigmata of recent hemorrhage; placed 2 clips successfully and 0 clips unsuccessfully (clips are MRI compatible); induced coagulation with with argon plasma coagulation.  Internal small (grade 2) hemorrhoids; no bleeding was identified    Significant Findings / Test Results:   CXR: Left base opacity corresponding with left pleural effusion and mild left lower lobe atelectasis on CT. Pneumonia not excluded in the appropriate clinical setting.   CT head:  No acute intracranial abnormality.  Chronic microangiopathic changes.  Findings suggesting acute sinusitis  CT chest abd pelvis: Distended gallbladder with gallstone and possible wall  thickening. Correlate clinically for right upper quadrant tenderness and if relevant ultrasound may be helpful to exclude acute cholecystitis.  New small left pleural effusion.  Mild circumferential bladder wall thickening, similar to previous study. No significant perivesical inflammatory changes.  If there is clinical concern for cystitis, correlate with urinalysis.  Visualization of the interventricular septum suggesting anemia.  Colonic diverticulosis without diverticulitis.  Hemoglobin 3.3 on admission    Incidental Findings:   None     Test Results Pending at Discharge (will require follow up):   None     Outpatient Tests Requested:  Capsule endoscopy as outpt    Complications:  None    Reason for Admission: Anemia    Hospital Course:   Amanda Gonzalez is a 88 y.o. female patient who originally presented to the hospital on 5/23/2024 due to fall.    Past medical history significant for paroxysmal A-fib, hypertension.  Presented to the emergency department following a fall.  Incidentally patient was found to have hemoglobin of 3.3 and received several blood transfusions.  Gastroenterology was consulted, home ASA/Eliquis were held.  Patient underwent EGD/colonoscopy with gastritis and AVM of the cecum s/p clips.  Eliquis was resumed without further evidence of bleeding and stable hemoglobin.  Patient had intermittent afib RVR and metoprolol was increased to 25 mg BID.  On day of discharge patient was hemodynamically stable for discharge to rehab.    Please see above list of diagnoses and related plan for additional information.     Condition at Discharge: stable    Discharge Day Visit / Exam:   * Please refer to separate progress note for these details *    Discussion with Family: Updated  (daughter) via phone.    Discharge instructions/Information to patient and family:   See after visit summary for information provided to patient and family.      Provisions for Follow-Up Care:  See after visit  summary for information related to follow-up care and any pertinent home health orders.      Mobility at time of Discharge:   Basic Mobility Inpatient Raw Score: 11  JH-HLM Goal: 4: Move to chair/commode  JH-HLM Achieved: 2: Bed activities/Dependent transfer  HLM Goal NOT achieved. Continue to encourage mobility in post discharge setting.     Disposition:   Assisted Living Facility at [facility]    Planned Readmission: None     Discharge Statement:  I spent 60 minutes discharging the patient. This time was spent on the day of discharge. I had direct contact with the patient on the day of discharge. Greater than 50% of the total time was spent examining patient, answering all patient questions, arranging and discussing plan of care with patient as well as directly providing post-discharge instructions.  Additional time then spent on discharge activities.    Discharge Medications:  See after visit summary for reconciled discharge medications provided to patient and/or family.      **Please Note: This note may have been constructed using a voice recognition system**

## 2024-05-29 NOTE — CASE MANAGEMENT
Called into [facility] H&CC P# [phone] to check status of pending SNF auth ref# [id number], spoke to Ana who stated auth was sent to MD for additional review and is still pending at this time. Stated MD will either make determination or request additional information.     CM notified: Richy SHER

## 2024-05-29 NOTE — PLAN OF CARE
Problem: INFECTION - ADULT  Goal: Absence or prevention of progression during hospitalization  Description: INTERVENTIONS:  - Assess and monitor for signs and symptoms of infection  - Monitor lab/diagnostic results  - Monitor all insertion sites, i.e. indwelling lines, tubes, and drains  - Monitor endotracheal if appropriate and nasal secretions for changes in amount and color  - [place] appropriate cooling/warming therapies per order  - Administer medications as ordered  - Instruct and encourage patient and family to use good hand hygiene technique  - Identify and instruct in appropriate isolation precautions for identified infection/condition  Outcome: Progressing  Goal: Absence of fever/infection during neutropenic period  Description: INTERVENTIONS:  - Monitor WBC    Outcome: Progressing

## 2024-05-29 NOTE — ANESTHESIA POSTPROCEDURE EVALUATION
Post-Op Assessment Note    CV Status:  Stable  Pain Score: 0    Pain management: adequate       Mental Status:  Alert and awake   Hydration Status:  Euvolemic and stable   PONV Controlled:  None   Airway Patency:  Patent     Post Op Vitals Reviewed: Yes    No anethesia notable event occurred.    Staff: Anesthesiologist               BP      Temp      Pulse     Resp      SpO2

## 2024-05-29 NOTE — ASSESSMENT & PLAN NOTE
Reports a fall either 5 to 8 weeks ago with head strike from wheelchair, states that she was never evaluated since the fall  CT C/A/P without any evidence of trauma  Fall precautions  PT OT consult - recommending rehab

## 2024-05-29 NOTE — CASE MANAGEMENT
Rec'd call from [employer] @ ([phone]) the rep stated that she will send the auth to the MD to review it.  SHAMAR notifiedd Marjan Tiwari

## 2024-05-30 ENCOUNTER — TRANSITIONAL CARE MANAGEMENT (OUTPATIENT)
Dept: FAMILY MEDICINE CLINIC | Facility: CLINIC | Age: 89
End: 2024-05-30

## 2024-05-30 PROCEDURE — 88342 IMHCHEM/IMCYTCHM 1ST ANTB: CPT | Performed by: PATHOLOGY

## 2024-05-30 PROCEDURE — 88305 TISSUE EXAM BY PATHOLOGIST: CPT | Performed by: PATHOLOGY

## 2024-05-30 PROCEDURE — 88341 IMHCHEM/IMCYTCHM EA ADD ANTB: CPT | Performed by: PATHOLOGY

## 2024-05-30 NOTE — CASE MANAGEMENT
Support Center has received DENIAL for SNF Authorization.   Insurance: [employer]  Called in by Rep: Mukul   Denial Reason: Doesn't meet the CM's guidelines  Facility: [facility]    Denial #: [id number]    Appeal P# [phone]     /Appeal F# [phone]    Care Manager notified: Marjan Tiwari     Please reach out to CM for updates on any clinical information.

## 2024-05-30 NOTE — CASE MANAGEMENT
9:58am-   Support Center has received PEER TO PEER request prior to determination being made.   Received for SNF Authorization.   Insurance: [employer] H&CC   Called in by Rep: Sheila BALBUENA  Facility: [facility]   Pending Auth #: [id number]   Peer to Peer Phone#:  [phone] [phone]    Deadline: 5/30 2pm EST   Please notify Discharge Support if P2P will not be completed.     Care Manager notified: Richy SHER     Please reach out to CM for updates on any clinical information.        10:50am-  Called into [employer] H&CC P# [phone] to notify that per CM P2P will not be completed. Spoke to Yojana who stated she will note that P2P was declined.

## 2024-07-16 ENCOUNTER — TELEPHONE (OUTPATIENT)
Dept: ADMINISTRATIVE | Facility: OTHER | Age: 89
End: 2024-07-16

## 2024-07-16 NOTE — TELEPHONE ENCOUNTER
07/16/24 2:11 PM    Patient was flagged by a Third Party Payer report as being due for a refill on the following medications, lisinopril 10 mg tablet. Patient was contacted to review these medications. There was no answer and a message was left.     Thank you.  Nayely Vail  [facility]